# Patient Record
Sex: MALE | Race: WHITE | Employment: OTHER | URBAN - NONMETROPOLITAN AREA
[De-identification: names, ages, dates, MRNs, and addresses within clinical notes are randomized per-mention and may not be internally consistent; named-entity substitution may affect disease eponyms.]

---

## 2020-01-01 ENCOUNTER — HOSPITAL ENCOUNTER (OUTPATIENT)
Dept: INFUSION THERAPY | Age: 85
Discharge: HOME | End: 2020-11-25
Payer: MEDICARE

## 2020-01-01 ENCOUNTER — TELEPHONE (OUTPATIENT)
Dept: UROLOGY | Age: 85
End: 2020-01-01

## 2020-01-01 ENCOUNTER — NURSE ONLY (OUTPATIENT)
Dept: UROLOGY | Age: 85
End: 2020-01-01
Payer: MEDICARE

## 2020-01-01 ENCOUNTER — CARE COORDINATION (OUTPATIENT)
Dept: CASE MANAGEMENT | Age: 85
End: 2020-01-01

## 2020-01-01 ENCOUNTER — HOSPITAL ENCOUNTER (OUTPATIENT)
Dept: INFUSION THERAPY | Age: 85
Discharge: HOME | End: 2020-12-30
Payer: MEDICARE

## 2020-01-01 ENCOUNTER — HOSPITAL ENCOUNTER (OUTPATIENT)
Age: 85
Setting detail: OUTPATIENT SURGERY
Discharge: HOME | End: 2020-12-11
Attending: UROLOGY | Admitting: UROLOGY
Payer: MEDICARE

## 2020-01-01 ENCOUNTER — ANESTHESIA EVENT (OUTPATIENT)
Dept: OPERATING ROOM | Age: 85
End: 2020-01-01
Payer: MEDICARE

## 2020-01-01 ENCOUNTER — ANESTHESIA (OUTPATIENT)
Dept: OPERATING ROOM | Age: 85
End: 2020-01-01
Payer: MEDICARE

## 2020-01-01 ENCOUNTER — HOSPITAL ENCOUNTER (OUTPATIENT)
Age: 85
Setting detail: SPECIMEN
Discharge: HOME | End: 2020-11-25
Payer: MEDICARE

## 2020-01-01 ENCOUNTER — OFFICE VISIT (OUTPATIENT)
Dept: NEPHROLOGY | Age: 85
End: 2020-01-01
Payer: MEDICARE

## 2020-01-01 VITALS
DIASTOLIC BLOOD PRESSURE: 75 MMHG | BODY MASS INDEX: 29.03 KG/M2 | SYSTOLIC BLOOD PRESSURE: 123 MMHG | RESPIRATION RATE: 15 BRPM | OXYGEN SATURATION: 100 % | HEIGHT: 67 IN | WEIGHT: 185 LBS | HEART RATE: 68 BPM | TEMPERATURE: 96.8 F

## 2020-01-01 VITALS
RESPIRATION RATE: 16 BRPM | OXYGEN SATURATION: 98 % | DIASTOLIC BLOOD PRESSURE: 59 MMHG | TEMPERATURE: 97.7 F | SYSTOLIC BLOOD PRESSURE: 118 MMHG | HEART RATE: 83 BPM

## 2020-01-01 VITALS
RESPIRATION RATE: 16 BRPM | TEMPERATURE: 98.2 F | OXYGEN SATURATION: 97 % | DIASTOLIC BLOOD PRESSURE: 53 MMHG | SYSTOLIC BLOOD PRESSURE: 100 MMHG | HEART RATE: 77 BPM

## 2020-01-01 VITALS
DIASTOLIC BLOOD PRESSURE: 60 MMHG | WEIGHT: 181 LBS | HEART RATE: 74 BPM | SYSTOLIC BLOOD PRESSURE: 100 MMHG | BODY MASS INDEX: 28.35 KG/M2

## 2020-01-01 VITALS — OXYGEN SATURATION: 99 % | DIASTOLIC BLOOD PRESSURE: 58 MMHG | TEMPERATURE: 98.1 F | SYSTOLIC BLOOD PRESSURE: 117 MMHG

## 2020-01-01 DIAGNOSIS — C61 PROSTATE CANCER, PRIMARY, WITH METASTASIS FROM PROSTATE TO OTHER SITE (HCC): Primary | ICD-10-CM

## 2020-01-01 LAB
ABSOLUTE EOS #: 0.29 K/UL (ref 0–0.44)
ABSOLUTE IMMATURE GRANULOCYTE: 0.04 K/UL (ref 0–0.3)
ABSOLUTE LYMPH #: 0.74 K/UL (ref 1.1–3.7)
ABSOLUTE MONO #: 0.4 K/UL (ref 0.1–1.2)
ALBUMIN SERPL-MCNC: 4.3 G/DL (ref 3.5–5.2)
ALBUMIN/GLOBULIN RATIO: 1.1 (ref 1–2.5)
ALP BLD-CCNC: 136 U/L (ref 40–129)
ALT SERPL-CCNC: 11 U/L (ref 5–41)
ANION GAP SERPL CALCULATED.3IONS-SCNC: 10 MMOL/L (ref 9–17)
ANION GAP SERPL CALCULATED.3IONS-SCNC: 11 MMOL/L (ref 9–17)
AST SERPL-CCNC: 24 U/L
BASOPHILS # BLD: 0 % (ref 0–2)
BASOPHILS ABSOLUTE: <0.03 K/UL (ref 0–0.2)
BILIRUB SERPL-MCNC: 0.33 MG/DL (ref 0.3–1.2)
BUN BLDV-MCNC: 27 MG/DL (ref 8–23)
BUN BLDV-MCNC: 28 MG/DL (ref 8–23)
BUN/CREAT BLD: 17 (ref 9–20)
BUN/CREAT BLD: 21 (ref 9–20)
CALCIUM SERPL-MCNC: 9.4 MG/DL (ref 8.6–10.4)
CALCIUM SERPL-MCNC: 9.7 MG/DL (ref 8.6–10.4)
CHLORIDE BLD-SCNC: 107 MMOL/L (ref 98–107)
CHLORIDE BLD-SCNC: 109 MMOL/L (ref 98–107)
CO2: 21 MMOL/L (ref 20–31)
CO2: 22 MMOL/L (ref 20–31)
CREAT SERPL-MCNC: 1.36 MG/DL (ref 0.7–1.2)
CREAT SERPL-MCNC: 1.58 MG/DL (ref 0.7–1.2)
CULTURE: ABNORMAL
DIFFERENTIAL TYPE: ABNORMAL
EOSINOPHILS RELATIVE PERCENT: 5 % (ref 1–4)
FERRITIN: 852 UG/L (ref 30–400)
GFR AFRICAN AMERICAN: 51 ML/MIN
GFR AFRICAN AMERICAN: >60 ML/MIN
GFR NON-AFRICAN AMERICAN: 42 ML/MIN
GFR NON-AFRICAN AMERICAN: 50 ML/MIN
GFR SERPL CREATININE-BSD FRML MDRD: ABNORMAL ML/MIN/{1.73_M2}
GLUCOSE BLD-MCNC: 109 MG/DL (ref 70–99)
GLUCOSE FASTING: 101 MG/DL (ref 70–99)
HCT VFR BLD CALC: 34 % (ref 40.7–50.3)
HEMOGLOBIN: 10.4 G/DL (ref 13–17)
IMMATURE GRANULOCYTES: 1 %
IRON SATURATION: 21 % (ref 20–55)
IRON: 55 UG/DL (ref 59–158)
LYMPHOCYTES # BLD: 12 % (ref 24–43)
Lab: ABNORMAL
Lab: ABNORMAL
MCH RBC QN AUTO: 27.4 PG (ref 25.2–33.5)
MCHC RBC AUTO-ENTMCNC: 30.6 G/DL (ref 25.2–33.5)
MCV RBC AUTO: 89.5 FL (ref 82.6–102.9)
MONOCYTES # BLD: 6 % (ref 3–12)
NRBC AUTOMATED: 0 PER 100 WBC
PDW BLD-RTO: 16.5 % (ref 11.8–14.4)
PLATELET # BLD: 233 K/UL (ref 138–453)
PLATELET ESTIMATE: ABNORMAL
PMV BLD AUTO: 9.4 FL (ref 8.1–13.5)
POC POTASSIUM: 4 MMOL/L (ref 3.5–4.5)
POTASSIUM SERPL-SCNC: 4.8 MMOL/L (ref 3.7–5.3)
POTASSIUM SERPL-SCNC: 5.1 MMOL/L (ref 3.7–5.3)
PROSTATE SPECIFIC ANTIGEN: 2.22 UG/L
RBC # BLD: 3.8 M/UL (ref 4.21–5.77)
RBC # BLD: ABNORMAL 10*6/UL
SEG NEUTROPHILS: 76 % (ref 36–65)
SEGMENTED NEUTROPHILS ABSOLUTE COUNT: 4.94 K/UL (ref 1.5–8.1)
SODIUM BLD-SCNC: 140 MMOL/L (ref 135–144)
SODIUM BLD-SCNC: 140 MMOL/L (ref 135–144)
SPECIMEN DESCRIPTION: ABNORMAL
SPECIMEN DESCRIPTION: ABNORMAL
TOTAL IRON BINDING CAPACITY: 256 UG/DL (ref 250–450)
TOTAL PROTEIN: 8.2 G/DL (ref 6.4–8.3)
UNSATURATED IRON BINDING CAPACITY: 201 UG/DL (ref 112–347)
WBC # BLD: 6.4 K/UL (ref 3.5–11.3)
WBC # BLD: ABNORMAL 10*3/UL

## 2020-01-01 PROCEDURE — 99213 OFFICE O/P EST LOW 20 MIN: CPT | Performed by: INTERNAL MEDICINE

## 2020-01-01 PROCEDURE — 82728 ASSAY OF FERRITIN: CPT

## 2020-01-01 PROCEDURE — 84132 ASSAY OF SERUM POTASSIUM: CPT

## 2020-01-01 PROCEDURE — 87186 SC STD MICRODIL/AGAR DIL: CPT

## 2020-01-01 PROCEDURE — G8484 FLU IMMUNIZE NO ADMIN: HCPCS | Performed by: INTERNAL MEDICINE

## 2020-01-01 PROCEDURE — 87086 URINE CULTURE/COLONY COUNT: CPT

## 2020-01-01 PROCEDURE — 84153 ASSAY OF PSA TOTAL: CPT

## 2020-01-01 PROCEDURE — 7100000001 HC PACU RECOVERY - ADDTL 15 MIN: Performed by: UROLOGY

## 2020-01-01 PROCEDURE — 87077 CULTURE AEROBIC IDENTIFY: CPT

## 2020-01-01 PROCEDURE — 3700000000 HC ANESTHESIA ATTENDED CARE: Performed by: UROLOGY

## 2020-01-01 PROCEDURE — 96366 THER/PROPH/DIAG IV INF ADDON: CPT

## 2020-01-01 PROCEDURE — 7100000010 HC PHASE II RECOVERY - FIRST 15 MIN: Performed by: UROLOGY

## 2020-01-01 PROCEDURE — 36415 COLL VENOUS BLD VENIPUNCTURE: CPT

## 2020-01-01 PROCEDURE — 99214 OFFICE O/P EST MOD 30 MIN: CPT

## 2020-01-01 PROCEDURE — 2580000003 HC RX 258: Performed by: UROLOGY

## 2020-01-01 PROCEDURE — 83550 IRON BINDING TEST: CPT

## 2020-01-01 PROCEDURE — 1036F TOBACCO NON-USER: CPT | Performed by: INTERNAL MEDICINE

## 2020-01-01 PROCEDURE — G8427 DOCREV CUR MEDS BY ELIG CLIN: HCPCS | Performed by: INTERNAL MEDICINE

## 2020-01-01 PROCEDURE — 6360000002 HC RX W HCPCS: Performed by: INTERNAL MEDICINE

## 2020-01-01 PROCEDURE — 2500000003 HC RX 250 WO HCPCS: Performed by: NURSE ANESTHETIST, CERTIFIED REGISTERED

## 2020-01-01 PROCEDURE — 80048 BASIC METABOLIC PNL TOTAL CA: CPT

## 2020-01-01 PROCEDURE — 99212 OFFICE O/P EST SF 10 MIN: CPT

## 2020-01-01 PROCEDURE — 6360000002 HC RX W HCPCS: Performed by: NURSE ANESTHETIST, CERTIFIED REGISTERED

## 2020-01-01 PROCEDURE — 96365 THER/PROPH/DIAG IV INF INIT: CPT

## 2020-01-01 PROCEDURE — 1123F ACP DISCUSS/DSCN MKR DOCD: CPT | Performed by: INTERNAL MEDICINE

## 2020-01-01 PROCEDURE — 2720000010 HC SURG SUPPLY STERILE: Performed by: UROLOGY

## 2020-01-01 PROCEDURE — 3600000014 HC SURGERY LEVEL 4 ADDTL 15MIN: Performed by: UROLOGY

## 2020-01-01 PROCEDURE — 6360000002 HC RX W HCPCS: Performed by: STUDENT IN AN ORGANIZED HEALTH CARE EDUCATION/TRAINING PROGRAM

## 2020-01-01 PROCEDURE — 2580000003 HC RX 258: Performed by: ANESTHESIOLOGY

## 2020-01-01 PROCEDURE — 7100000011 HC PHASE II RECOVERY - ADDTL 15 MIN: Performed by: UROLOGY

## 2020-01-01 PROCEDURE — 4040F PNEUMOC VAC/ADMIN/RCVD: CPT | Performed by: INTERNAL MEDICINE

## 2020-01-01 PROCEDURE — G8417 CALC BMI ABV UP PARAM F/U: HCPCS | Performed by: INTERNAL MEDICINE

## 2020-01-01 PROCEDURE — 3600000004 HC SURGERY LEVEL 4 BASE: Performed by: UROLOGY

## 2020-01-01 PROCEDURE — 3700000001 HC ADD 15 MINUTES (ANESTHESIA): Performed by: UROLOGY

## 2020-01-01 PROCEDURE — 80053 COMPREHEN METABOLIC PANEL: CPT

## 2020-01-01 PROCEDURE — 51702 INSERT TEMP BLADDER CATH: CPT | Performed by: UROLOGY

## 2020-01-01 PROCEDURE — 2580000003 HC RX 258: Performed by: INTERNAL MEDICINE

## 2020-01-01 PROCEDURE — 99213 OFFICE O/P EST LOW 20 MIN: CPT | Performed by: UROLOGY

## 2020-01-01 PROCEDURE — 85025 COMPLETE CBC W/AUTO DIFF WBC: CPT

## 2020-01-01 PROCEDURE — 7100000000 HC PACU RECOVERY - FIRST 15 MIN: Performed by: UROLOGY

## 2020-01-01 PROCEDURE — 83540 ASSAY OF IRON: CPT

## 2020-01-01 PROCEDURE — 2709999900 HC NON-CHARGEABLE SUPPLY: Performed by: UROLOGY

## 2020-01-01 PROCEDURE — 96367 TX/PROPH/DG ADDL SEQ IV INF: CPT

## 2020-01-01 PROCEDURE — 96402 CHEMO HORMON ANTINEOPL SQ/IM: CPT

## 2020-01-01 PROCEDURE — 96413 CHEMO IV INFUSION 1 HR: CPT

## 2020-01-01 RX ORDER — SODIUM CHLORIDE 9 MG/ML
20 INJECTION, SOLUTION INTRAVENOUS ONCE
Status: DISCONTINUED | OUTPATIENT
Start: 2020-01-01 | End: 2020-01-01 | Stop reason: HOSPADM

## 2020-01-01 RX ORDER — MAGNESIUM HYDROXIDE 1200 MG/15ML
LIQUID ORAL CONTINUOUS PRN
Status: COMPLETED | OUTPATIENT
Start: 2020-01-01 | End: 2020-01-01

## 2020-01-01 RX ORDER — SODIUM CHLORIDE 0.9 % (FLUSH) 0.9 %
10 SYRINGE (ML) INJECTION PRN
Status: CANCELLED | OUTPATIENT
Start: 2020-01-01

## 2020-01-01 RX ORDER — FERROUS SULFATE 325(65) MG
325 TABLET ORAL 2 TIMES DAILY
Qty: 60 TABLET | Refills: 2 | Status: SHIPPED | OUTPATIENT
Start: 2020-01-01 | End: 2021-01-01 | Stop reason: SDUPTHER

## 2020-01-01 RX ORDER — PROPOFOL 10 MG/ML
INJECTION, EMULSION INTRAVENOUS PRN
Status: DISCONTINUED | OUTPATIENT
Start: 2020-01-01 | End: 2020-01-01 | Stop reason: SDUPTHER

## 2020-01-01 RX ORDER — MAGNESIUM HYDROXIDE 1200 MG/15ML
LIQUID ORAL PRN
Status: DISCONTINUED | OUTPATIENT
Start: 2020-01-01 | End: 2020-01-01 | Stop reason: ALTCHOICE

## 2020-01-01 RX ORDER — MELATONIN
2000 DAILY
Qty: 30 TABLET | Refills: 5 | Status: SHIPPED | OUTPATIENT
Start: 2020-01-01 | End: 2021-01-01 | Stop reason: SDUPTHER

## 2020-01-01 RX ORDER — EPINEPHRINE 1 MG/ML
0.3 INJECTION, SOLUTION, CONCENTRATE INTRAVENOUS PRN
Status: CANCELLED | OUTPATIENT
Start: 2020-01-01

## 2020-01-01 RX ORDER — SODIUM CHLORIDE 0.9 % (FLUSH) 0.9 %
5 SYRINGE (ML) INJECTION PRN
Status: CANCELLED | OUTPATIENT
Start: 2020-01-01

## 2020-01-01 RX ORDER — SODIUM CHLORIDE 9 MG/ML
INJECTION, SOLUTION INTRAVENOUS CONTINUOUS
Status: CANCELLED | OUTPATIENT
Start: 2020-01-01

## 2020-01-01 RX ORDER — LEVOFLOXACIN 250 MG/1
250 TABLET ORAL DAILY
Qty: 7 TABLET | Refills: 0 | Status: SHIPPED | OUTPATIENT
Start: 2020-01-01 | End: 2020-01-01 | Stop reason: ALTCHOICE

## 2020-01-01 RX ORDER — HEPARIN SODIUM (PORCINE) LOCK FLUSH IV SOLN 100 UNIT/ML 100 UNIT/ML
500 SOLUTION INTRAVENOUS PRN
Status: CANCELLED | OUTPATIENT
Start: 2020-01-01

## 2020-01-01 RX ORDER — SODIUM CHLORIDE 9 MG/ML
20 INJECTION, SOLUTION INTRAVENOUS ONCE
Status: CANCELLED | OUTPATIENT
Start: 2020-01-01 | End: 2020-01-01

## 2020-01-01 RX ORDER — PHENYLEPHRINE HYDROCHLORIDE 10 MG/ML
INJECTION INTRAVENOUS PRN
Status: DISCONTINUED | OUTPATIENT
Start: 2020-01-01 | End: 2020-01-01 | Stop reason: SDUPTHER

## 2020-01-01 RX ORDER — LIDOCAINE HYDROCHLORIDE 10 MG/ML
INJECTION, SOLUTION EPIDURAL; INFILTRATION; INTRACAUDAL; PERINEURAL PRN
Status: DISCONTINUED | OUTPATIENT
Start: 2020-01-01 | End: 2020-01-01 | Stop reason: SDUPTHER

## 2020-01-01 RX ORDER — METHYLPREDNISOLONE SODIUM SUCCINATE 125 MG/2ML
125 INJECTION, POWDER, LYOPHILIZED, FOR SOLUTION INTRAMUSCULAR; INTRAVENOUS ONCE
Status: CANCELLED | OUTPATIENT
Start: 2020-01-01 | End: 2020-01-01

## 2020-01-01 RX ORDER — FENTANYL CITRATE 50 UG/ML
INJECTION, SOLUTION INTRAMUSCULAR; INTRAVENOUS PRN
Status: DISCONTINUED | OUTPATIENT
Start: 2020-01-01 | End: 2020-01-01 | Stop reason: SDUPTHER

## 2020-01-01 RX ORDER — WATER 10 ML/10ML
2.2 INJECTION INTRAMUSCULAR; INTRAVENOUS; SUBCUTANEOUS ONCE
Status: CANCELLED | OUTPATIENT
Start: 2020-01-01 | End: 2020-01-01

## 2020-01-01 RX ORDER — LEVOFLOXACIN 5 MG/ML
500 INJECTION, SOLUTION INTRAVENOUS
Status: COMPLETED | OUTPATIENT
Start: 2020-01-01 | End: 2020-01-01

## 2020-01-01 RX ORDER — CEPHALEXIN 500 MG/1
500 CAPSULE ORAL 2 TIMES DAILY
Qty: 20 CAPSULE | Refills: 0 | Status: SHIPPED | OUTPATIENT
Start: 2020-01-01 | End: 2020-01-01

## 2020-01-01 RX ORDER — DIPHENHYDRAMINE HYDROCHLORIDE 50 MG/ML
50 INJECTION INTRAMUSCULAR; INTRAVENOUS ONCE
Status: CANCELLED | OUTPATIENT
Start: 2020-01-01 | End: 2020-01-01

## 2020-01-01 RX ORDER — HYDROCODONE BITARTRATE AND ACETAMINOPHEN 5; 325 MG/1; MG/1
1 TABLET ORAL EVERY 6 HOURS PRN
Qty: 5 TABLET | Refills: 0 | Status: SHIPPED | OUTPATIENT
Start: 2020-01-01 | End: 2020-01-01

## 2020-01-01 RX ORDER — SODIUM CHLORIDE, SODIUM LACTATE, POTASSIUM CHLORIDE, CALCIUM CHLORIDE 600; 310; 30; 20 MG/100ML; MG/100ML; MG/100ML; MG/100ML
INJECTION, SOLUTION INTRAVENOUS CONTINUOUS
Status: DISCONTINUED | OUTPATIENT
Start: 2020-01-01 | End: 2020-01-01 | Stop reason: HOSPADM

## 2020-01-01 RX ORDER — SODIUM CHLORIDE 9 MG/ML
20 INJECTION, SOLUTION INTRAVENOUS ONCE
Status: COMPLETED | OUTPATIENT
Start: 2020-01-01 | End: 2020-01-01

## 2020-01-01 RX ORDER — BICALUTAMIDE 50 MG/1
TABLET, FILM COATED ORAL
Qty: 30 TABLET | Refills: 1 | Status: SHIPPED | OUTPATIENT
Start: 2020-01-01 | End: 2021-01-01 | Stop reason: SDUPTHER

## 2020-01-01 RX ORDER — CIPROFLOXACIN 500 MG/1
500 TABLET, FILM COATED ORAL 2 TIMES DAILY
Qty: 14 TABLET | Refills: 0 | Status: SHIPPED | OUTPATIENT
Start: 2020-01-01 | End: 2020-01-01

## 2020-01-01 RX ADMIN — PHENYLEPHRINE HYDROCHLORIDE 100 MCG: 10 INJECTION INTRAVENOUS at 13:47

## 2020-01-01 RX ADMIN — SODIUM CHLORIDE, POTASSIUM CHLORIDE, SODIUM LACTATE AND CALCIUM CHLORIDE: 600; 310; 30; 20 INJECTION, SOLUTION INTRAVENOUS at 11:45

## 2020-01-01 RX ADMIN — SODIUM CHLORIDE 20 ML/HR: 9 INJECTION, SOLUTION INTRAVENOUS at 11:45

## 2020-01-01 RX ADMIN — ZOLEDRONIC ACID 3 MG: 4 INJECTION, SOLUTION, CONCENTRATE INTRAVENOUS at 12:20

## 2020-01-01 RX ADMIN — FENTANYL CITRATE 25 MCG: 50 INJECTION, SOLUTION INTRAMUSCULAR; INTRAVENOUS at 13:12

## 2020-01-01 RX ADMIN — LEUPROLIDE ACETATE 22.5 MG: KIT SUBCUTANEOUS at 11:31

## 2020-01-01 RX ADMIN — PROPOFOL 130 MG: 10 INJECTION, EMULSION INTRAVENOUS at 13:00

## 2020-01-01 RX ADMIN — PHENYLEPHRINE HYDROCHLORIDE 100 MCG: 10 INJECTION INTRAVENOUS at 13:12

## 2020-01-01 RX ADMIN — ZOLEDRONIC ACID 3 MG: 4 INJECTION, SOLUTION, CONCENTRATE INTRAVENOUS at 10:39

## 2020-01-01 RX ADMIN — LEVOFLOXACIN 500 MG: 5 INJECTION, SOLUTION INTRAVENOUS at 13:04

## 2020-01-01 RX ADMIN — FENTANYL CITRATE 25 MCG: 50 INJECTION, SOLUTION INTRAMUSCULAR; INTRAVENOUS at 13:30

## 2020-01-01 RX ADMIN — PHENYLEPHRINE HYDROCHLORIDE 100 MCG: 10 INJECTION INTRAVENOUS at 13:14

## 2020-01-01 RX ADMIN — PHENYLEPHRINE HYDROCHLORIDE 100 MCG: 10 INJECTION INTRAVENOUS at 14:06

## 2020-01-01 RX ADMIN — PHENYLEPHRINE HYDROCHLORIDE 100 MCG: 10 INJECTION INTRAVENOUS at 13:23

## 2020-01-01 RX ADMIN — PHENYLEPHRINE HYDROCHLORIDE 100 MCG: 10 INJECTION INTRAVENOUS at 13:38

## 2020-01-01 RX ADMIN — SODIUM CHLORIDE 20 ML/HR: 9 INJECTION, SOLUTION INTRAVENOUS at 09:53

## 2020-01-01 RX ADMIN — LIDOCAINE HYDROCHLORIDE 40 MG: 10 INJECTION, SOLUTION EPIDURAL; INFILTRATION; INTRACAUDAL; PERINEURAL at 13:00

## 2020-01-01 RX ADMIN — FENTANYL CITRATE 25 MCG: 50 INJECTION, SOLUTION INTRAMUSCULAR; INTRAVENOUS at 13:20

## 2020-01-01 RX ADMIN — FENTANYL CITRATE 25 MCG: 50 INJECTION, SOLUTION INTRAMUSCULAR; INTRAVENOUS at 13:55

## 2020-01-01 ASSESSMENT — PULMONARY FUNCTION TESTS
PIF_VALUE: 8
PIF_VALUE: 9
PIF_VALUE: 2
PIF_VALUE: 8
PIF_VALUE: 9
PIF_VALUE: 9
PIF_VALUE: 7
PIF_VALUE: 8
PIF_VALUE: 9
PIF_VALUE: 9
PIF_VALUE: 10
PIF_VALUE: 9
PIF_VALUE: 7
PIF_VALUE: 0
PIF_VALUE: 8
PIF_VALUE: 7
PIF_VALUE: 3
PIF_VALUE: 7
PIF_VALUE: 8
PIF_VALUE: 10
PIF_VALUE: 8
PIF_VALUE: 1
PIF_VALUE: 7
PIF_VALUE: 8
PIF_VALUE: 9
PIF_VALUE: 8
PIF_VALUE: 9
PIF_VALUE: 10
PIF_VALUE: 9
PIF_VALUE: 8
PIF_VALUE: 8
PIF_VALUE: 3
PIF_VALUE: 8
PIF_VALUE: 9
PIF_VALUE: 8
PIF_VALUE: 8
PIF_VALUE: 9
PIF_VALUE: 7
PIF_VALUE: 8
PIF_VALUE: 7
PIF_VALUE: 9
PIF_VALUE: 2
PIF_VALUE: 11
PIF_VALUE: 8
PIF_VALUE: 9
PIF_VALUE: 9
PIF_VALUE: 10
PIF_VALUE: 8
PIF_VALUE: 8
PIF_VALUE: 9
PIF_VALUE: 8
PIF_VALUE: 0
PIF_VALUE: 9
PIF_VALUE: 8
PIF_VALUE: 9
PIF_VALUE: 8
PIF_VALUE: 9
PIF_VALUE: 8
PIF_VALUE: 7
PIF_VALUE: 3
PIF_VALUE: 7
PIF_VALUE: 3
PIF_VALUE: 8
PIF_VALUE: 11
PIF_VALUE: 1
PIF_VALUE: 7
PIF_VALUE: 7
PIF_VALUE: 8
PIF_VALUE: 0
PIF_VALUE: 11
PIF_VALUE: 8
PIF_VALUE: 7
PIF_VALUE: 7
PIF_VALUE: 1
PIF_VALUE: 8
PIF_VALUE: 8
PIF_VALUE: 3
PIF_VALUE: 8
PIF_VALUE: 8
PIF_VALUE: 0
PIF_VALUE: 7
PIF_VALUE: 9
PIF_VALUE: 9
PIF_VALUE: 4
PIF_VALUE: 9
PIF_VALUE: 10
PIF_VALUE: 8
PIF_VALUE: 7
PIF_VALUE: 8
PIF_VALUE: 9
PIF_VALUE: 3
PIF_VALUE: 9
PIF_VALUE: 7
PIF_VALUE: 13
PIF_VALUE: 8

## 2020-01-01 ASSESSMENT — PAIN - FUNCTIONAL ASSESSMENT: PAIN_FUNCTIONAL_ASSESSMENT: 0-10

## 2020-01-01 ASSESSMENT — PAIN SCALES - GENERAL: PAINLEVEL_OUTOF10: 0

## 2020-08-28 ENCOUNTER — HOSPITAL ENCOUNTER (INPATIENT)
Age: 85
LOS: 5 days | Discharge: HOME HEALTH | DRG: 682 | End: 2020-09-02
Attending: INTERNAL MEDICINE | Admitting: INTERNAL MEDICINE
Payer: MEDICARE

## 2020-08-28 ENCOUNTER — APPOINTMENT (OUTPATIENT)
Dept: ULTRASOUND IMAGING | Age: 85
DRG: 682 | End: 2020-08-28
Attending: INTERNAL MEDICINE
Payer: MEDICARE

## 2020-08-28 PROBLEM — I25.810 CORONARY ARTERY DISEASE INVOLVING CORONARY BYPASS GRAFT OF NATIVE HEART WITHOUT ANGINA PECTORIS: Status: ACTIVE | Noted: 2020-08-28

## 2020-08-28 PROBLEM — N13.8 BENIGN PROSTATIC HYPERPLASIA WITH URINARY OBSTRUCTION: Status: ACTIVE | Noted: 2020-08-28

## 2020-08-28 PROBLEM — N40.1 BENIGN PROSTATIC HYPERPLASIA WITH URINARY OBSTRUCTION: Status: ACTIVE | Noted: 2020-08-28

## 2020-08-28 PROBLEM — I50.23 ACUTE ON CHRONIC SYSTOLIC HEART FAILURE (HCC): Status: ACTIVE | Noted: 2020-08-28

## 2020-08-28 PROBLEM — N17.9 ACUTE RENAL FAILURE (HCC): Status: ACTIVE | Noted: 2020-08-28

## 2020-08-28 PROBLEM — N13.30 HYDROURETERONEPHROSIS: Status: ACTIVE | Noted: 2020-08-28

## 2020-08-28 PROBLEM — E87.5 HYPERKALEMIA: Status: ACTIVE | Noted: 2020-08-28

## 2020-08-28 LAB
ABSOLUTE EOS #: 0.17 K/UL (ref 0–0.4)
ABSOLUTE IMMATURE GRANULOCYTE: 0 K/UL (ref 0–0.3)
ABSOLUTE LYMPH #: 0.94 K/UL (ref 1–4.8)
ABSOLUTE MONO #: 0.17 K/UL (ref 0.1–0.8)
ALBUMIN SERPL-MCNC: 3.4 G/DL (ref 3.5–5.2)
ALBUMIN/GLOBULIN RATIO: 1.3 (ref 1–2.5)
ALP BLD-CCNC: 93 U/L (ref 40–129)
ALT SERPL-CCNC: 11 U/L (ref 5–41)
ANION GAP SERPL CALCULATED.3IONS-SCNC: 19 MMOL/L (ref 9–17)
AST SERPL-CCNC: 12 U/L
BASOPHILS # BLD: 0 % (ref 0–2)
BASOPHILS ABSOLUTE: 0 K/UL (ref 0–0.2)
BILIRUB SERPL-MCNC: 0.44 MG/DL (ref 0.3–1.2)
BILIRUBIN DIRECT: 0.24 MG/DL
BILIRUBIN, INDIRECT: 0.2 MG/DL (ref 0–1)
BUN BLDV-MCNC: 103 MG/DL (ref 8–23)
CALCIUM SERPL-MCNC: 7.5 MG/DL (ref 8.6–10.4)
CHLORIDE BLD-SCNC: 103 MMOL/L (ref 98–107)
CO2: 18 MMOL/L (ref 20–31)
CREAT SERPL-MCNC: 7.76 MG/DL (ref 0.7–1.2)
DIFFERENTIAL TYPE: ABNORMAL
EOSINOPHILS RELATIVE PERCENT: 3 % (ref 1–4)
GFR AFRICAN AMERICAN: 8 ML/MIN
GFR NON-AFRICAN AMERICAN: 7 ML/MIN
GFR SERPL CREATININE-BSD FRML MDRD: ABNORMAL ML/MIN/{1.73_M2}
GFR SERPL CREATININE-BSD FRML MDRD: ABNORMAL ML/MIN/{1.73_M2}
GLUCOSE BLD-MCNC: 123 MG/DL (ref 70–99)
HCT VFR BLD CALC: 28.8 % (ref 40.7–50.3)
HEMOGLOBIN: 8.8 G/DL (ref 13–17)
IMMATURE GRANULOCYTES: 0 %
LYMPHOCYTES # BLD: 17 % (ref 24–44)
MCH RBC QN AUTO: 29.9 PG (ref 25.2–33.5)
MCHC RBC AUTO-ENTMCNC: 30.6 G/DL (ref 28.4–34.8)
MCV RBC AUTO: 98 FL (ref 82.6–102.9)
MONOCYTES # BLD: 3 % (ref 1–7)
MORPHOLOGY: NORMAL
NRBC AUTOMATED: 0 PER 100 WBC
PDW BLD-RTO: 13.9 % (ref 11.8–14.4)
PLATELET # BLD: 124 K/UL (ref 138–453)
PLATELET ESTIMATE: ABNORMAL
PMV BLD AUTO: 10.4 FL (ref 8.1–13.5)
POTASSIUM SERPL-SCNC: 5.2 MMOL/L (ref 3.7–5.3)
RBC # BLD: 2.94 M/UL (ref 4.21–5.77)
RBC # BLD: ABNORMAL 10*6/UL
SEG NEUTROPHILS: 77 % (ref 36–66)
SEGMENTED NEUTROPHILS ABSOLUTE COUNT: 4.22 K/UL (ref 1.8–7.7)
SODIUM BLD-SCNC: 140 MMOL/L (ref 135–144)
TOTAL PROTEIN: 6.1 G/DL (ref 6.4–8.3)
WBC # BLD: 5.5 K/UL (ref 3.5–11.3)
WBC # BLD: ABNORMAL 10*3/UL

## 2020-08-28 PROCEDURE — 76775 US EXAM ABDO BACK WALL LIM: CPT

## 2020-08-28 PROCEDURE — 84484 ASSAY OF TROPONIN QUANT: CPT

## 2020-08-28 PROCEDURE — 82248 BILIRUBIN DIRECT: CPT

## 2020-08-28 PROCEDURE — 80053 COMPREHEN METABOLIC PANEL: CPT

## 2020-08-28 PROCEDURE — 1200000000 HC SEMI PRIVATE

## 2020-08-28 PROCEDURE — 83874 ASSAY OF MYOGLOBIN: CPT

## 2020-08-28 PROCEDURE — 85025 COMPLETE CBC W/AUTO DIFF WBC: CPT

## 2020-08-28 PROCEDURE — 84153 ASSAY OF PSA TOTAL: CPT

## 2020-08-28 PROCEDURE — 82378 CARCINOEMBRYONIC ANTIGEN: CPT

## 2020-08-28 PROCEDURE — 83880 ASSAY OF NATRIURETIC PEPTIDE: CPT

## 2020-08-28 PROCEDURE — APPSS60 APP SPLIT SHARED TIME 46-60 MINUTES: Performed by: NURSE PRACTITIONER

## 2020-08-28 PROCEDURE — 36415 COLL VENOUS BLD VENIPUNCTURE: CPT

## 2020-08-28 PROCEDURE — 2580000003 HC RX 258: Performed by: NURSE PRACTITIONER

## 2020-08-28 RX ORDER — SACUBITRIL AND VALSARTAN 97; 103 MG/1; MG/1
1 TABLET, FILM COATED ORAL 2 TIMES DAILY
Status: ON HOLD | COMMUNITY
End: 2020-09-02 | Stop reason: HOSPADM

## 2020-08-28 RX ORDER — PROMETHAZINE HYDROCHLORIDE 25 MG/1
12.5 TABLET ORAL EVERY 6 HOURS PRN
Status: DISCONTINUED | OUTPATIENT
Start: 2020-08-28 | End: 2020-09-02 | Stop reason: HOSPADM

## 2020-08-28 RX ORDER — ASPIRIN 81 MG/1
81 TABLET ORAL DAILY
Status: ON HOLD | COMMUNITY
End: 2020-01-01 | Stop reason: HOSPADM

## 2020-08-28 RX ORDER — ONDANSETRON 2 MG/ML
4 INJECTION INTRAMUSCULAR; INTRAVENOUS EVERY 6 HOURS PRN
Status: DISCONTINUED | OUTPATIENT
Start: 2020-08-28 | End: 2020-09-02 | Stop reason: HOSPADM

## 2020-08-28 RX ORDER — SODIUM CHLORIDE 0.9 % (FLUSH) 0.9 %
10 SYRINGE (ML) INJECTION PRN
Status: DISCONTINUED | OUTPATIENT
Start: 2020-08-28 | End: 2020-09-02 | Stop reason: HOSPADM

## 2020-08-28 RX ORDER — NICOTINE 21 MG/24HR
1 PATCH, TRANSDERMAL 24 HOURS TRANSDERMAL DAILY PRN
Status: DISCONTINUED | OUTPATIENT
Start: 2020-08-28 | End: 2020-09-02 | Stop reason: HOSPADM

## 2020-08-28 RX ORDER — ROSUVASTATIN CALCIUM 10 MG/1
10 TABLET, COATED ORAL DAILY
COMMUNITY

## 2020-08-28 RX ORDER — CARVEDILOL 25 MG/1
25 TABLET ORAL 2 TIMES DAILY
Status: ON HOLD | COMMUNITY
End: 2020-09-02 | Stop reason: HOSPADM

## 2020-08-28 RX ORDER — SODIUM CHLORIDE 0.9 % (FLUSH) 0.9 %
10 SYRINGE (ML) INJECTION EVERY 12 HOURS SCHEDULED
Status: DISCONTINUED | OUTPATIENT
Start: 2020-08-28 | End: 2020-09-02 | Stop reason: HOSPADM

## 2020-08-28 RX ORDER — ACETAMINOPHEN 650 MG/1
650 SUPPOSITORY RECTAL EVERY 6 HOURS PRN
Status: DISCONTINUED | OUTPATIENT
Start: 2020-08-28 | End: 2020-09-02 | Stop reason: HOSPADM

## 2020-08-28 RX ORDER — ACETAMINOPHEN 325 MG/1
650 TABLET ORAL EVERY 6 HOURS PRN
Status: DISCONTINUED | OUTPATIENT
Start: 2020-08-28 | End: 2020-09-02 | Stop reason: HOSPADM

## 2020-08-28 RX ORDER — SACUBITRIL AND VALSARTAN 24; 26 MG/1; MG/1
1 TABLET, FILM COATED ORAL 2 TIMES DAILY
Status: ON HOLD | COMMUNITY
End: 2020-09-02 | Stop reason: HOSPADM

## 2020-08-28 RX ORDER — TAMSULOSIN HYDROCHLORIDE 0.4 MG/1
0.4 CAPSULE ORAL DAILY
COMMUNITY
End: 2021-01-01 | Stop reason: ALTCHOICE

## 2020-08-28 RX ORDER — POLYETHYLENE GLYCOL 3350 17 G/17G
17 POWDER, FOR SOLUTION ORAL DAILY PRN
Status: DISCONTINUED | OUTPATIENT
Start: 2020-08-28 | End: 2020-09-02 | Stop reason: HOSPADM

## 2020-08-28 RX ADMIN — SODIUM CHLORIDE, PRESERVATIVE FREE 10 ML: 5 INJECTION INTRAVENOUS at 23:17

## 2020-08-28 SDOH — HEALTH STABILITY: MENTAL HEALTH: HOW MANY STANDARD DRINKS CONTAINING ALCOHOL DO YOU HAVE ON A TYPICAL DAY?: 1 OR 2

## 2020-08-28 SDOH — HEALTH STABILITY: MENTAL HEALTH: HOW OFTEN DO YOU HAVE A DRINK CONTAINING ALCOHOL?: 4 OR MORE TIMES A WEEK

## 2020-08-29 ENCOUNTER — APPOINTMENT (OUTPATIENT)
Dept: NUCLEAR MEDICINE | Age: 85
DRG: 682 | End: 2020-08-29
Attending: INTERNAL MEDICINE
Payer: MEDICARE

## 2020-08-29 PROBLEM — R31.0 GROSS HEMATURIA: Status: ACTIVE | Noted: 2020-08-29

## 2020-08-29 PROBLEM — K92.2 UPPER GASTROINTESTINAL BLEEDING: Status: ACTIVE | Noted: 2018-09-13

## 2020-08-29 PROBLEM — C61 PROSTATE CANCER, PRIMARY, WITH METASTASIS FROM PROSTATE TO OTHER SITE (HCC): Status: ACTIVE | Noted: 2020-08-29

## 2020-08-29 PROBLEM — I44.7 LBBB (LEFT BUNDLE BRANCH BLOCK): Status: ACTIVE | Noted: 2020-08-29

## 2020-08-29 PROBLEM — I12.9 BENIGN HYPERTENSION WITH CHRONIC KIDNEY DISEASE, STAGE II: Status: ACTIVE | Noted: 2020-08-29

## 2020-08-29 PROBLEM — N18.2 BENIGN HYPERTENSION WITH CHRONIC KIDNEY DISEASE, STAGE II: Status: ACTIVE | Noted: 2020-08-29

## 2020-08-29 PROBLEM — N13.9 OBSTRUCTIVE UROPATHY: Status: ACTIVE | Noted: 2020-08-29

## 2020-08-29 PROBLEM — I71.40 ABDOMINAL AORTIC ANEURYSM (AAA) WITHOUT RUPTURE: Status: ACTIVE | Noted: 2020-07-28

## 2020-08-29 PROBLEM — E78.00 HYPERCHOLESTEROLEMIA: Status: ACTIVE | Noted: 2018-09-13

## 2020-08-29 PROBLEM — E78.2 MIXED HYPERLIPIDEMIA: Status: ACTIVE | Noted: 2020-07-27

## 2020-08-29 PROBLEM — N18.30 CKD (CHRONIC KIDNEY DISEASE) STAGE 3, GFR 30-59 ML/MIN (HCC): Chronic | Status: ACTIVE | Noted: 2020-08-29

## 2020-08-29 PROBLEM — I50.22 CHRONIC SYSTOLIC HEART FAILURE (HCC): Status: ACTIVE | Noted: 2018-09-13

## 2020-08-29 LAB
-: NORMAL
ABSOLUTE EOS #: 0.22 K/UL (ref 0–0.44)
ABSOLUTE IMMATURE GRANULOCYTE: 0 K/UL (ref 0–0.3)
ABSOLUTE LYMPH #: 0.43 K/UL (ref 1.1–3.7)
ABSOLUTE MONO #: 0.49 K/UL (ref 0.1–1.2)
ALBUMIN SERPL-MCNC: 3.5 G/DL (ref 3.5–5.2)
ALBUMIN SERPL-MCNC: 3.5 G/DL (ref 3.5–5.2)
ALBUMIN/GLOBULIN RATIO: 1.4 (ref 1–2.5)
ALP BLD-CCNC: 93 U/L (ref 40–129)
ALT SERPL-CCNC: 10 U/L (ref 5–41)
AMORPHOUS: NORMAL
ANION GAP SERPL CALCULATED.3IONS-SCNC: 18 MMOL/L (ref 9–17)
ANION GAP SERPL CALCULATED.3IONS-SCNC: 19 MMOL/L (ref 9–17)
AST SERPL-CCNC: 11 U/L
BACTERIA: NORMAL
BASOPHILS # BLD: 0 % (ref 0–2)
BASOPHILS ABSOLUTE: 0 K/UL (ref 0–0.2)
BILIRUB SERPL-MCNC: 0.56 MG/DL (ref 0.3–1.2)
BILIRUBIN DIRECT: 0.27 MG/DL
BILIRUBIN URINE: NEGATIVE
BILIRUBIN, INDIRECT: 0.29 MG/DL (ref 0–1)
BNP INTERPRETATION: ABNORMAL
BNP INTERPRETATION: ABNORMAL
BUN BLDV-MCNC: 92 MG/DL (ref 8–23)
BUN BLDV-MCNC: 94 MG/DL (ref 8–23)
BUN/CREAT BLD: ABNORMAL (ref 9–20)
BUN/CREAT BLD: ABNORMAL (ref 9–20)
CALCIUM SERPL-MCNC: 7.5 MG/DL (ref 8.6–10.4)
CALCIUM SERPL-MCNC: 7.6 MG/DL (ref 8.6–10.4)
CARCINOEMBRYONIC ANTIGEN: 2 NG/ML
CASTS UA: NORMAL /LPF (ref 0–2)
CHLORIDE BLD-SCNC: 104 MMOL/L (ref 98–107)
CHLORIDE BLD-SCNC: 104 MMOL/L (ref 98–107)
CO2: 16 MMOL/L (ref 20–31)
CO2: 21 MMOL/L (ref 20–31)
COLOR: ABNORMAL
COMMENT UA: ABNORMAL
COMPLEMENT C3: 101 MG/DL (ref 90–180)
COMPLEMENT C4: 39 MG/DL (ref 10–40)
CREAT SERPL-MCNC: 6.01 MG/DL (ref 0.7–1.2)
CREAT SERPL-MCNC: 6.04 MG/DL (ref 0.7–1.2)
CREATININE URINE: 37.9 MG/DL (ref 39–259)
CRYSTALS, UA: NORMAL /HPF
DIFFERENTIAL TYPE: ABNORMAL
EOSINOPHIL,URINE: ABNORMAL
EOSINOPHILS RELATIVE PERCENT: 4 % (ref 1–4)
EPITHELIAL CELLS UA: NORMAL /HPF (ref 0–5)
FREE KAPPA/LAMBDA RATIO: 1.38 (ref 0.26–1.65)
GFR AFRICAN AMERICAN: 11 ML/MIN
GFR AFRICAN AMERICAN: 11 ML/MIN
GFR NON-AFRICAN AMERICAN: 9 ML/MIN
GFR NON-AFRICAN AMERICAN: 9 ML/MIN
GFR SERPL CREATININE-BSD FRML MDRD: ABNORMAL ML/MIN/{1.73_M2}
GLOBULIN: ABNORMAL G/DL (ref 1.5–3.8)
GLUCOSE BLD-MCNC: 100 MG/DL (ref 70–99)
GLUCOSE BLD-MCNC: 158 MG/DL (ref 70–99)
GLUCOSE URINE: NEGATIVE
HCT VFR BLD CALC: 28.1 % (ref 40.7–50.3)
HEMOGLOBIN: 8.5 G/DL (ref 13–17)
IMMATURE GRANULOCYTES: 0 %
IRON SATURATION: 11 % (ref 20–55)
IRON: 27 UG/DL (ref 59–158)
KAPPA FREE LIGHT CHAINS QNT: 4.44 MG/DL (ref 0.37–1.94)
KETONES, URINE: ABNORMAL
LAMBDA FREE LIGHT CHAINS QNT: 3.21 MG/DL (ref 0.57–2.63)
LEUKOCYTE ESTERASE, URINE: ABNORMAL
LYMPHOCYTES # BLD: 8 % (ref 24–43)
MAGNESIUM: 2.3 MG/DL (ref 1.6–2.6)
MCH RBC QN AUTO: 28.7 PG (ref 25.2–33.5)
MCHC RBC AUTO-ENTMCNC: 30.2 G/DL (ref 28.4–34.8)
MCV RBC AUTO: 94.9 FL (ref 82.6–102.9)
MONOCYTES # BLD: 9 % (ref 3–12)
MORPHOLOGY: NORMAL
MUCUS: NORMAL
MYOGLOBIN: 113 NG/ML (ref 28–72)
MYOGLOBIN: 124 NG/ML (ref 28–72)
MYOGLOBIN: 127 NG/ML (ref 28–72)
MYOGLOBIN: 140 NG/ML (ref 28–72)
NITRITE, URINE: POSITIVE
NRBC AUTOMATED: 0 PER 100 WBC
OSMOLALITY URINE: 326 MOSM/KG (ref 80–1300)
OTHER OBSERVATIONS UA: NORMAL
PDW BLD-RTO: 13.9 % (ref 11.8–14.4)
PH UA: 7.5 (ref 5–8)
PHOSPHORUS: 5.9 MG/DL (ref 2.5–4.5)
PLATELET # BLD: 157 K/UL (ref 138–453)
PLATELET ESTIMATE: ABNORMAL
PMV BLD AUTO: 10.6 FL (ref 8.1–13.5)
POTASSIUM SERPL-SCNC: 4.3 MMOL/L (ref 3.7–5.3)
POTASSIUM SERPL-SCNC: 4.5 MMOL/L (ref 3.7–5.3)
PRO-BNP: ABNORMAL PG/ML
PRO-BNP: ABNORMAL PG/ML
PROSTATE SPECIFIC ANTIGEN: 97.66 UG/L
PROTEIN UA: ABNORMAL
RBC # BLD: 2.96 M/UL (ref 4.21–5.77)
RBC # BLD: ABNORMAL 10*6/UL
RBC UA: NORMAL /HPF (ref 0–2)
RENAL EPITHELIAL, UA: NORMAL /HPF
SEG NEUTROPHILS: 79 % (ref 36–65)
SEGMENTED NEUTROPHILS ABSOLUTE COUNT: 4.26 K/UL (ref 1.5–8.1)
SODIUM BLD-SCNC: 139 MMOL/L (ref 135–144)
SODIUM BLD-SCNC: 143 MMOL/L (ref 135–144)
SODIUM,UR: 98 MMOL/L
SPECIFIC GRAVITY UA: 1.01 (ref 1–1.03)
TOTAL CK: 99 U/L (ref 39–308)
TOTAL IRON BINDING CAPACITY: 240 UG/DL (ref 250–450)
TOTAL PROTEIN, URINE: 279 MG/DL
TOTAL PROTEIN: 6 G/DL (ref 6.4–8.3)
TRICHOMONAS: NORMAL
TROPONIN INTERP: ABNORMAL
TROPONIN T: ABNORMAL NG/ML
TROPONIN, HIGH SENSITIVITY: 103 NG/L (ref 0–22)
TROPONIN, HIGH SENSITIVITY: 105 NG/L (ref 0–22)
TROPONIN, HIGH SENSITIVITY: 105 NG/L (ref 0–22)
TROPONIN, HIGH SENSITIVITY: 106 NG/L (ref 0–22)
TURBIDITY: ABNORMAL
UNSATURATED IRON BINDING CAPACITY: 213 UG/DL (ref 112–347)
URINE HGB: ABNORMAL
URINE TOTAL PROTEIN CREATININE RATIO: 7.36 (ref 0–0.2)
UROBILINOGEN, URINE: ABNORMAL
WBC # BLD: 5.4 K/UL (ref 3.5–11.3)
WBC # BLD: ABNORMAL 10*3/UL
WBC UA: NORMAL /HPF (ref 0–5)
YEAST: NORMAL

## 2020-08-29 PROCEDURE — 87205 SMEAR GRAM STAIN: CPT

## 2020-08-29 PROCEDURE — 84155 ASSAY OF PROTEIN SERUM: CPT

## 2020-08-29 PROCEDURE — 82550 ASSAY OF CK (CPK): CPT

## 2020-08-29 PROCEDURE — 93005 ELECTROCARDIOGRAM TRACING: CPT | Performed by: NURSE PRACTITIONER

## 2020-08-29 PROCEDURE — 78306 BONE IMAGING WHOLE BODY: CPT

## 2020-08-29 PROCEDURE — 36415 COLL VENOUS BLD VENIPUNCTURE: CPT

## 2020-08-29 PROCEDURE — 82570 ASSAY OF URINE CREATININE: CPT

## 2020-08-29 PROCEDURE — 83880 ASSAY OF NATRIURETIC PEPTIDE: CPT

## 2020-08-29 PROCEDURE — 84156 ASSAY OF PROTEIN URINE: CPT

## 2020-08-29 PROCEDURE — 83935 ASSAY OF URINE OSMOLALITY: CPT

## 2020-08-29 PROCEDURE — 83735 ASSAY OF MAGNESIUM: CPT

## 2020-08-29 PROCEDURE — 80069 RENAL FUNCTION PANEL: CPT

## 2020-08-29 PROCEDURE — 3430000000 HC RX DIAGNOSTIC RADIOPHARMACEUTICAL: Performed by: INTERNAL MEDICINE

## 2020-08-29 PROCEDURE — 84165 PROTEIN E-PHORESIS SERUM: CPT

## 2020-08-29 PROCEDURE — 6370000000 HC RX 637 (ALT 250 FOR IP): Performed by: STUDENT IN AN ORGANIZED HEALTH CARE EDUCATION/TRAINING PROGRAM

## 2020-08-29 PROCEDURE — 2580000003 HC RX 258: Performed by: NURSE PRACTITIONER

## 2020-08-29 PROCEDURE — 2500000003 HC RX 250 WO HCPCS: Performed by: INTERNAL MEDICINE

## 2020-08-29 PROCEDURE — 99233 SBSQ HOSP IP/OBS HIGH 50: CPT | Performed by: INTERNAL MEDICINE

## 2020-08-29 PROCEDURE — 51700 IRRIGATION OF BLADDER: CPT

## 2020-08-29 PROCEDURE — 80053 COMPREHEN METABOLIC PANEL: CPT

## 2020-08-29 PROCEDURE — 86160 COMPLEMENT ANTIGEN: CPT

## 2020-08-29 PROCEDURE — 6370000000 HC RX 637 (ALT 250 FOR IP): Performed by: NURSE PRACTITIONER

## 2020-08-29 PROCEDURE — 83540 ASSAY OF IRON: CPT

## 2020-08-29 PROCEDURE — 83874 ASSAY OF MYOGLOBIN: CPT

## 2020-08-29 PROCEDURE — 2580000003 HC RX 258: Performed by: STUDENT IN AN ORGANIZED HEALTH CARE EDUCATION/TRAINING PROGRAM

## 2020-08-29 PROCEDURE — 84100 ASSAY OF PHOSPHORUS: CPT

## 2020-08-29 PROCEDURE — 84300 ASSAY OF URINE SODIUM: CPT

## 2020-08-29 PROCEDURE — 51702 INSERT TEMP BLADDER CATH: CPT

## 2020-08-29 PROCEDURE — 85025 COMPLETE CBC W/AUTO DIFF WBC: CPT

## 2020-08-29 PROCEDURE — 1200000000 HC SEMI PRIVATE

## 2020-08-29 PROCEDURE — 80048 BASIC METABOLIC PNL TOTAL CA: CPT

## 2020-08-29 PROCEDURE — 83883 ASSAY NEPHELOMETRY NOT SPEC: CPT

## 2020-08-29 PROCEDURE — 86334 IMMUNOFIX E-PHORESIS SERUM: CPT

## 2020-08-29 PROCEDURE — 99221 1ST HOSP IP/OBS SF/LOW 40: CPT | Performed by: INTERNAL MEDICINE

## 2020-08-29 PROCEDURE — 81001 URINALYSIS AUTO W/SCOPE: CPT

## 2020-08-29 PROCEDURE — 80076 HEPATIC FUNCTION PANEL: CPT

## 2020-08-29 PROCEDURE — 84166 PROTEIN E-PHORESIS/URINE/CSF: CPT

## 2020-08-29 PROCEDURE — A9503 TC99M MEDRONATE: HCPCS | Performed by: INTERNAL MEDICINE

## 2020-08-29 PROCEDURE — 86335 IMMUNFIX E-PHORSIS/URINE/CSF: CPT

## 2020-08-29 PROCEDURE — 2580000003 HC RX 258: Performed by: INTERNAL MEDICINE

## 2020-08-29 PROCEDURE — 82248 BILIRUBIN DIRECT: CPT

## 2020-08-29 PROCEDURE — 84484 ASSAY OF TROPONIN QUANT: CPT

## 2020-08-29 PROCEDURE — 83550 IRON BINDING TEST: CPT

## 2020-08-29 RX ORDER — ASPIRIN 81 MG/1
TABLET, CHEWABLE ORAL
Status: ON HOLD | COMMUNITY
End: 2020-09-02 | Stop reason: HOSPADM

## 2020-08-29 RX ORDER — MAGNESIUM HYDROXIDE 1200 MG/15ML
3000 LIQUID ORAL CONTINUOUS
Status: DISCONTINUED | OUTPATIENT
Start: 2020-08-29 | End: 2020-09-02 | Stop reason: HOSPADM

## 2020-08-29 RX ORDER — ASPIRIN 81 MG/1
81 TABLET ORAL DAILY
Status: DISCONTINUED | OUTPATIENT
Start: 2020-08-29 | End: 2020-09-02 | Stop reason: HOSPADM

## 2020-08-29 RX ORDER — FUROSEMIDE 40 MG/1
40 TABLET ORAL 2 TIMES DAILY
Status: ON HOLD | COMMUNITY
Start: 2020-08-11 | End: 2020-09-02 | Stop reason: SDUPTHER

## 2020-08-29 RX ORDER — TC 99M MEDRONATE 20 MG/10ML
27 INJECTION, POWDER, LYOPHILIZED, FOR SOLUTION INTRAVENOUS
Status: COMPLETED | OUTPATIENT
Start: 2020-08-29 | End: 2020-08-29

## 2020-08-29 RX ORDER — TAMSULOSIN HYDROCHLORIDE 0.4 MG/1
0.4 CAPSULE ORAL DAILY
Status: DISCONTINUED | OUTPATIENT
Start: 2020-08-29 | End: 2020-09-02 | Stop reason: HOSPADM

## 2020-08-29 RX ORDER — OXYBUTYNIN CHLORIDE 5 MG/1
5 TABLET ORAL EVERY 6 HOURS PRN
Status: DISCONTINUED | OUTPATIENT
Start: 2020-08-29 | End: 2020-08-30

## 2020-08-29 RX ORDER — 0.9 % SODIUM CHLORIDE 0.9 %
1000 INTRAVENOUS SOLUTION INTRAVENOUS ONCE
Status: DISCONTINUED | OUTPATIENT
Start: 2020-08-29 | End: 2020-09-02 | Stop reason: HOSPADM

## 2020-08-29 RX ORDER — POTASSIUM CHLORIDE 750 MG/1
20 TABLET, FILM COATED, EXTENDED RELEASE ORAL DAILY
Status: ON HOLD | COMMUNITY
Start: 2020-08-11 | End: 2020-09-02 | Stop reason: SDUPTHER

## 2020-08-29 RX ORDER — MIDODRINE HYDROCHLORIDE 5 MG/1
5 TABLET ORAL
Status: DISCONTINUED | OUTPATIENT
Start: 2020-08-29 | End: 2020-09-01

## 2020-08-29 RX ORDER — LIDOCAINE HYDROCHLORIDE 20 MG/ML
JELLY TOPICAL PRN
Status: DISCONTINUED | OUTPATIENT
Start: 2020-08-29 | End: 2020-09-02 | Stop reason: HOSPADM

## 2020-08-29 RX ORDER — CARVEDILOL 25 MG/1
25 TABLET ORAL 2 TIMES DAILY
Status: DISCONTINUED | OUTPATIENT
Start: 2020-08-29 | End: 2020-09-02 | Stop reason: HOSPADM

## 2020-08-29 RX ORDER — MORPHINE SULFATE 2 MG/ML
2 INJECTION, SOLUTION INTRAMUSCULAR; INTRAVENOUS EVERY 4 HOURS PRN
Status: DISCONTINUED | OUTPATIENT
Start: 2020-08-29 | End: 2020-09-02 | Stop reason: HOSPADM

## 2020-08-29 RX ADMIN — SODIUM CHLORIDE 3000 ML: 900 IRRIGANT IRRIGATION at 13:15

## 2020-08-29 RX ADMIN — TC 99M MEDRONATE 27 MILLICURIE: 20 INJECTION, POWDER, LYOPHILIZED, FOR SOLUTION INTRAVENOUS at 11:00

## 2020-08-29 RX ADMIN — Medication: at 10:07

## 2020-08-29 RX ADMIN — SODIUM CHLORIDE 3000 ML: 900 IRRIGANT IRRIGATION at 12:45

## 2020-08-29 RX ADMIN — SODIUM CHLORIDE 3000 ML: 900 IRRIGANT IRRIGATION at 14:36

## 2020-08-29 RX ADMIN — LIDOCAINE HYDROCHLORIDE: 20 JELLY TOPICAL at 12:22

## 2020-08-29 RX ADMIN — TAMSULOSIN HYDROCHLORIDE 0.4 MG: 0.4 CAPSULE ORAL at 08:40

## 2020-08-29 RX ADMIN — SODIUM CHLORIDE, PRESERVATIVE FREE 10 ML: 5 INJECTION INTRAVENOUS at 08:46

## 2020-08-29 ASSESSMENT — ENCOUNTER SYMPTOMS
NAUSEA: 0
WHEEZING: 0
BLOOD IN STOOL: 0
ABDOMINAL PAIN: 0
DIARRHEA: 0
SHORTNESS OF BREATH: 1
CONSTIPATION: 0
COUGH: 0
VOMITING: 0
STRIDOR: 0

## 2020-08-29 ASSESSMENT — PAIN SCALES - GENERAL: PAINLEVEL_OUTOF10: 0

## 2020-08-29 NOTE — PROGRESS NOTES
Urology Progress Note    Subjective: Saleem Soria is a 80 y.o. male. His/Her current Diet is: DIET GENERAL;. The patient is * No surgery found * from     No acute events overnight. No chest pain, shortness of breath, nausea, vomiting, fevers, chills  Tolerating Panchal catheter. Hand irrigated for clots overnight  Denies any family history of cancer  UOP 300cc/hr    Patient Vitals for the past 24 hrs:   BP Temp Temp src Pulse Resp SpO2 Height Weight   08/29/20 0745 (!) 106/49 97.7 °F (36.5 °C) Oral 60 15 94 % -- --   08/28/20 1945 (!) 122/53 97.4 °F (36.3 °C) Oral 68 18 96 % 5' 7\" (1.702 m) 217 lb 8 oz (98.7 kg)       Intake/Output Summary (Last 24 hours) at 8/29/2020 0805  Last data filed at 8/29/2020 9654  Gross per 24 hour   Intake --   Output 4100 ml   Net -4100 ml       Recent Labs     08/28/20  2106   WBC 5.5   HGB 8.8*   HCT 28.8*   MCV 98.0   *     Recent Labs     08/28/20  2106      K 5.2      CO2 18*   *   CREATININE 7.76*       Recent Labs     08/29/20  0428   COLORU RED*   PHUR 7.5   WBCUA 0 TO 2   RBCUA TOO NUMEROUS TO COUNT   MUCUS NOT REPORTED   TRICHOMONAS NOT REPORTED   YEAST NOT REPORTED   BACTERIA NOT REPORTED   SPECGRAV 1.015   LEUKOCYTESUR SMALL*   UROBILINOGEN ELEVATED*   BILIRUBINUR NEGATIVE       Physical Exam:     NAD, AOx3  RRR.  Peripheral pulses palpable  Respirations nonlabored, symmetric chest rise bilaterally  Abdomen: soft, nontender, nondistended  Lower extremities: No edema of calf tenderness bilaterally  Panchal 16F draining red tinged urine    Interval Imaging Findings:    Imaging was independently reviewed and checked with radiologist report    Impression:      Saleem Soria is a 80 y.o. male admitted with      Problem List  - Urinary Retention, Panchal for 3L  - Bilateral hydroureteronephrosis  - Post obstructive diuresis, physiologic  - Gross hematuria  - Elevated PSA, 97  - Osseus lesion on L1 and R Iliac bone  - Anemia, unclear

## 2020-08-29 NOTE — PLAN OF CARE
Problem: Falls - Risk of:  Goal: Will remain free from falls  Description: Will remain free from falls  8/29/2020 0548 by Messi Owens RN  Outcome: Ongoing  8/28/2020 2046 by Messi Owens RN  Outcome: Ongoing  Goal: Absence of physical injury  Description: Absence of physical injury  8/29/2020 0548 by Messi Owens RN  Outcome: Ongoing  8/28/2020 2046 by Messi Owens RN  Outcome: Ongoing     Problem: Fluid Volume:  Goal: Hemodynamic stability will improve  Description: Hemodynamic stability will improve  8/29/2020 0548 by Messi Owens RN  Outcome: Ongoing  8/28/2020 2046 by Messi Owens RN  Outcome: Ongoing  Goal: Ability to maintain a balanced intake and output will improve  Description: Ability to maintain a balanced intake and output will improve  8/29/2020 0548 by Messi Owens RN  Outcome: Ongoing  8/28/2020 2046 by Messi Owens RN  Outcome: Ongoing     Problem: Health Behavior:  Goal: Identification of resources available to assist in meeting health care needs will improve  Description: Identification of resources available to assist in meeting health care needs will improve  8/29/2020 0548 by Messi Owens RN  Outcome: Ongoing  8/28/2020 2046 by Messi Owens RN  Outcome: Ongoing     Problem: Respiratory:  Goal: Respiratory status will improve  Description: Respiratory status will improve  8/29/2020 0548 by Messi Owens RN  Outcome: Ongoing  8/28/2020 2046 by Messi Owens RN  Outcome: Ongoing     Problem: OXYGENATION/RESPIRATORY FUNCTION  Goal: Patient will maintain patent airway  8/29/2020 0548 by Messi Owens RN  Outcome: Ongoing  8/28/2020 2046 by Messi Owens RN  Outcome: Ongoing  Goal: Patient will achieve/maintain normal respiratory rate/effort  Description: Respiratory rate and effort will be within normal limits for the patient  8/29/2020 0548 by Messi Owens RN  Outcome: Ongoing  8/28/2020 2046 by Messi Owens RN  Outcome: Ongoing     Problem: HEMODYNAMIC STATUS  Goal: Patient has stable vital signs and fluid balance  8/29/2020 0548 by Carlos Bernardo RN  Outcome: Ongoing  8/28/2020 2046 by Carlos Bernarod RN  Outcome: Ongoing     Problem: FLUID AND ELECTROLYTE IMBALANCE  Goal: Fluid and electrolyte balance are achieved/maintained  8/29/2020 0548 by Carlos Bernardo RN  Outcome: Ongoing  8/28/2020 2046 by Carlos Bernardo RN  Outcome: Ongoing     Problem: ACTIVITY INTOLERANCE/IMPAIRED MOBILITY  Goal: Mobility/activity is maintained at optimum level for patient  8/29/2020 0548 by Carlos Bernardo RN  Outcome: Ongoing  8/28/2020 2046 by Carlos Bernardo RN  Outcome: Ongoing     Problem: Skin Integrity:  Goal: Will show no infection signs and symptoms  Description: Will show no infection signs and symptoms  Outcome: Ongoing  Goal: Absence of new skin breakdown  Description: Absence of new skin breakdown  Outcome: Ongoing

## 2020-08-29 NOTE — PLAN OF CARE
Problem: Falls - Risk of:  Goal: Will remain free from falls  Description: Will remain free from falls  8/29/2020 1946 by Fernanda Salgado RN  Outcome: Ongoing  8/29/2020 0548 by Mary Alejandra RN  Outcome: Ongoing  Goal: Absence of physical injury  Description: Absence of physical injury  8/29/2020 1946 by Fernanda Salgado RN  Outcome: Ongoing  8/29/2020 0548 by Mary Alejandra RN  Outcome: Ongoing     Problem: Fluid Volume:  Goal: Hemodynamic stability will improve  Description: Hemodynamic stability will improve  8/29/2020 1946 by Fernanda Salgado RN  Outcome: Ongoing  8/29/2020 0548 by Mary Alejandra RN  Outcome: Ongoing  Goal: Ability to maintain a balanced intake and output will improve  Description: Ability to maintain a balanced intake and output will improve  8/29/2020 1946 by Fernanda Salgado RN  Outcome: Ongoing  8/29/2020 0548 by Mary Alejandra RN  Outcome: Ongoing     Problem: Health Behavior:  Goal: Identification of resources available to assist in meeting health care needs will improve  Description: Identification of resources available to assist in meeting health care needs will improve  8/29/2020 1946 by Fernanda Salgado RN  Outcome: Ongoing  8/29/2020 0548 by Mary Alejandra RN  Outcome: Ongoing     Problem: Respiratory:  Goal: Respiratory status will improve  Description: Respiratory status will improve  8/29/2020 1946 by Fernanda Salgado RN  Outcome: Ongoing  8/29/2020 0548 by Mary Alejandra RN  Outcome: Ongoing     Problem: OXYGENATION/RESPIRATORY FUNCTION  Goal: Patient will maintain patent airway  8/29/2020 1946 by Fernanda Salgado RN  Outcome: Ongoing  8/29/2020 0548 by Mary Alejandra RN  Outcome: Ongoing  Goal: Patient will achieve/maintain normal respiratory rate/effort  Description: Respiratory rate and effort will be within normal limits for the patient  8/29/2020 1946 by Fernanda Salgado RN  Outcome: Ongoing  8/29/2020 0548 by Mary Alejandra RN  Outcome: Ongoing     Problem: HEMODYNAMIC STATUS  Goal: Patient has stable vital signs and fluid balance  8/29/2020 1946 by Logan Foley RN  Outcome: Ongoing  8/29/2020 0548 by Elias Duran RN  Outcome: Ongoing     Problem: FLUID AND ELECTROLYTE IMBALANCE  Goal: Fluid and electrolyte balance are achieved/maintained  8/29/2020 1946 by Logan Foley RN  Outcome: Ongoing  8/29/2020 0548 by Elias Duran RN  Outcome: Ongoing     Problem: ACTIVITY INTOLERANCE/IMPAIRED MOBILITY  Goal: Mobility/activity is maintained at optimum level for patient  8/29/2020 1946 by Logan Foley RN  Outcome: Ongoing  8/29/2020 0548 by Elias Duran RN  Outcome: Ongoing     Problem: Skin Integrity:  Goal: Will show no infection signs and symptoms  Description: Will show no infection signs and symptoms  8/29/2020 1946 by Logna Foley RN  Outcome: Ongoing  8/29/2020 0548 by Elias Duran RN  Outcome: Ongoing  Goal: Absence of new skin breakdown  Description: Absence of new skin breakdown  8/29/2020 1946 by Logan Foley RN  Outcome: Ongoing  8/29/2020 0548 by Elias Duran RN  Outcome: Ongoing

## 2020-08-29 NOTE — PROGRESS NOTES
Call placed to Dr. Nico Mallory regarding patient needing for catheter to be flushed more often. Order received for 3 way irrigation catheter. Catheter placed per DILCIA West.

## 2020-08-29 NOTE — H&P
[facility]  Office: [address], DO, Deondre Fry, DO, Patito Hoda, DO, Nikiavirgilio Carrilloizaiah Blood, DO, Deion Hughes MD, Dixon Hendricks MD, Santhosh Stinson MD, Charan Su MD, Mariya Mena MD, Jonathan Ferreira MD, Devon Coker MD, Mateo Asif MD, Tanna Villalobos MD, Jeyson Castellon DO, Zafar Franco MD, Ck Miller MD, Rajan Godinez, DO, Anna Kc MD,  Bharati Larsen, DO, Noe Melchor MD, Keven Stringer MD, Megan Etienne, [facility], [address], [facility], Kusum Hoyos, CNP, Linnea Fontenot, CNS, Yokasta Marx, CNP, Savanah Woodard, CNP, Marquise Denny, CNP, Diann Alejandra, CNP, Ermias Montesinos, CNP, Ramiro Estrada PA-C, Hoang Brooks, [facility], Wilner Chance, CNP, Ray Diaz, CNP, Jorje Laboy, CNP, Wilmary , CNP, Sebastian Mendez, [address]    HISTORY AND PHYSICAL EXAMINATION            Date:   2020  Patient name:  Bee Ramos  Date of admission:  2020  7:53 PM  MRN:   [id number]  Account:  [de-identified]  YOB: 1933  PCP:    BAM Padron CNP  Room:   6266/6571-50  Code Status:    Full Code    Chief Complaint:     Swelling and shortness of breath    History Obtained From:     patient, electronic medical record, later phone conversation with daughter    History of Present Illness:     Bee Ramos is a 80 y.o. Non-/non  male who presents with No chief complaint on file. and is admitted to the hospital for the management of acute renal failure superimposed on chronic kidney disease stage II with Obstructive uropathy with hydroureteronephrosis, and exacerbation of systolic heart failure.       Patient was sent to [place] [address] as a direct admit from [facility].  Patient presented to [facility] for the complaint of increased shortness of breath and swelling of the lower extremities after being called by his provider stating he needed to be seen in the emergency room for the elevated BUN and creatinine on repeat labs after increasing Lasix and potassium secondary to fluid overload acute exacerbation of heart failure in the outpatient setting . Patient states the swelling of the lower extremities  has been ongoing for approximately the past month and was recently referred and seen by cardiologist Dr. Michele Cloud who increased his Lasix to 40 mg and potassium to 20 mEq. However patient did not have any significant improvement in the swelling worsened, he apparently had a fall 2 weeks ago in the grass which he does not describe as syncopal which he states is \"his legs just got weak\", after discussion with his family and encouragement he then decided to seek treatment in the emergency room 2 days after the fall. He currently denies any chest pain, dizziness lightheadedness syncopal or presyncopal symptomology, nausea vomiting diarrhea. He denies any dysuria urinary frequency urgency incomplete emptying or changes in his bowel or bladder, as he states that he has obstipation in which he takes Linzess which then causes diarrhea. Work up in the emergency room      Vitals:    Temp.   97.4 hR. 70     RR. 18     BP.  124/67       SPO2. 94%    Laboratories:   Metabolic panel. -Sodium 040 potassium 5.7 chloride 98 CO2 24 anion gap 20  creatinine 9.8  GI/Liver Panel-alk phos 97 AST 19 ALT 15 total bili 0.06, bili conj 0.1  Hematology. -WBC 5.6 hemoglobin 8.7 hematocrit 27.5 MCV 93 MCH 29 MCHC 31 platelets 439 lymphocytes 0.31.  7 neutrophils 94 lymphocytes 3 monocytes 2 cm 1  Coagulation-  Cardiac Markers-troponin I 0.03.   EKG-sinus rhythm, marked axis deviation, left bundle branch block, DE interval 195 QRS 14  3 seconds, no previous EKG to compare with   urine-urine yellow, negative glucose, negative bilirubin, negative ketones, specific gravity 1.015, hemoglobin trace, pH 6.5, protein negative, nitrate negative, leukocyte negative, bacteria none white blood cells 0-2 RBCs 3.5 epi cells 0.2      Imaging and Diagnostics:   CT abdomen and pelvis without contrast-   Severe distention of the urinary bladder with bilateral hydroureteronephrosis. Diffusely sclerotic appearance of the L1 vertebral body suspicious for metastatic disease. There is also a sclerotic lesion in the right iliac bone. Infrarenal abdominal aortic aneurysm measuring 4.7 cm, aneurysmal sac may contain a calcified dissection flap versus atherosclerotic calcification of mural thrombus. Bilateral pleural effusions  Mildly enlarged prostate-measuring 5.2 x 5.4 cm      While at the outlying emergency room patient was noted to have the elevated creatinine Panchal catheter was placed and instantly returned 2 L of yellow urine in which when it was noted to be 2 L it was instantly clamped and drained, then after a period of time unclamped and returned a liter then had hematuria. Past Medical History:     Past Medical History:   Diagnosis Date    AAA (abdominal aortic aneurysm) ([facility] [address]     BPH (benign prostatic hyperplasia)     CAD (coronary artery disease)     Hyperlipidemia     Skin cancer, basal cell     Stroke (cerebrum) (HCC)     Systolic heart failure (HCC)       Patient and family deny history of chronic kidney disease however July 2020 creatinine was 1.96, BUN of 39  Past Surgical History:     Past Surgical History:   Procedure Laterality Date    CORONARY ARTERY BYPASS GRAFT      cabg x 4 in [facility]      Biopsy        Medications Prior to Admission:     Prior to Admission medications    Medication Sig Start Date End Date Taking?  Authorizing Provider   tamsulosin (FLOMAX) 0.4 MG capsule Take 0.4 mg by mouth daily   Yes Historical Provider, MD   carvedilol (COREG) 25 MG tablet Take 25 mg by mouth 2 times daily   Yes Historical Provider, MD   sacubitril-valsartan (ENTRESTO) 24-26 MG per tablet Take 1 tablet by mouth 2 times daily   Yes Historical Provider, MD   sacubitril-valsartan (ENTRESTO)  MG per tablet Take 1 tablet by mouth 2 times daily   Yes Historical Provider, MD   linaclotide (LINZESS) 145 MCG capsule Take 145 mcg by mouth every morning (before breakfast)   Yes Historical Provider, MD   rosuvastatin (CRESTOR) 10 MG tablet Take 10 mg by mouth daily   Yes Historical Provider, MD   aspirin 81 MG EC tablet Take 81 mg by mouth daily   Yes Historical Provider, MD        Allergies:     Patient has no known allergies. Social History:     Tobacco:    reports that he quit smoking about 54 years ago. His smoking use included cigarettes. He quit after 15.00 years of use. He has never used smokeless tobacco.  Alcohol:      reports current alcohol use. Drug Use:  reports no history of drug use. Family History:     Family History   Problem Relation Age of Onset    Heart Disease Mother     Hypertension Mother     Heart Disease Father     Heart Disease Brother        Review of Systems:     Positive and Negative as described in HPI. Review of Systems   Constitutional: Positive for activity change and fatigue. Negative for chills, diaphoresis and fever. HENT: Negative for congestion and hearing loss. Eyes: Negative for visual disturbance. Respiratory: Positive for shortness of breath. Negative for cough, wheezing and stridor. Cardiovascular: Positive for leg swelling. Negative for chest pain and palpitations. Gastrointestinal: Negative for abdominal pain, blood in stool, constipation, diarrhea, nausea and vomiting. Genitourinary: Negative for dysuria and frequency. Musculoskeletal: Negative for myalgias. Skin: Negative for rash. Neurological: Negative for dizziness, seizures and headaches. Psychiatric/Behavioral: The patient is not nervous/anxious.         Physical Exam:   BP (!) 122/53   Pulse 68   Temp 97.4 °F (36.3 °C) (Oral)   Resp 18   Ht 5' 7\" (1.702 m)   Wt 217 lb 8 oz (98.7 kg)   SpO2 96%   BMI 34.07 kg/m²   Temp (24hrs), Av.4 °F (36.3 °C), Min:97.4 °F (36.3 °C), Max:97.4 °F (36.3 °C)    No results for input(s): POCGLU in the last 72 hours. No intake or output data in the 24 hours ending 20    Physical Exam  Vitals signs and nursing note reviewed. Constitutional:       General: He is not in acute distress. Appearance: He is well-developed. He is not ill-appearing or diaphoretic. HENT:      Head: Normocephalic and atraumatic. Right Ear: Decreased hearing noted. Left Ear: Decreased hearing noted. Nose: Nose normal. No rhinorrhea. Eyes:      General: Lids are normal.      Extraocular Movements:      Right eye: Normal extraocular motion. Left eye: Normal extraocular motion. Conjunctiva/sclera: Conjunctivae normal.      Right eye: Right conjunctiva is not injected. Left eye: Left conjunctiva is not injected. Pupils: Pupils are equal, round, and reactive to light. Pupils are equal.      Right eye: Pupil is reactive. Left eye: Pupil is reactive. Neck:      Musculoskeletal: Neck supple. Thyroid: No thyromegaly. Vascular: No carotid bruit. Trachea: Trachea and phonation normal. No tracheal deviation. Cardiovascular:      Rate and Rhythm: Normal rate and regular rhythm. Pulses: Normal pulses. Heart sounds: Murmur present. Pulmonary:      Effort: Pulmonary effort is normal. Tachypnea (With activity) present. No respiratory distress. Breath sounds: No stridor. Examination of the right-middle field reveals rales. Examination of the left-middle field reveals rales. Examination of the right-lower field reveals rales. Examination of the left-lower field reveals rales. Wheezing and rales present. No decreased breath sounds. Abdominal:      General: Abdomen is protuberant. Bowel sounds are normal. There is no distension. Palpations: Abdomen is soft. There is no mass. Tenderness: There is no abdominal tenderness. There is no guarding. Genitourinary:     Comments: Panchal catheter with hematuria  Musculoskeletal:         General: No tenderness. Right lower leg: 3+ Edema present. Left lower leg: 3+ Edema present. Comments: Moves all extremities   Skin:     General: Skin is warm and dry. Findings: No erythema, lesion or rash. Neurological:      General: No focal deficit present. Mental Status: He is alert and oriented to person, place, and time. He is not disoriented. GCS: GCS eye subscore is 4. GCS verbal subscore is 5. GCS motor subscore is 6. Cranial Nerves: No cranial nerve deficit or dysarthria. Sensory: No sensory deficit. Motor: Motor function is intact. Psychiatric:         Attention and Perception: Attention normal.         Speech: Speech normal.         Behavior: Behavior normal. Behavior is cooperative. Investigations:      Laboratory Testing:  No results found for this or any previous visit (from the past 24 hour(s)). Imaging/Diagnostics:  CTA and Pelvis showed on 9/10/19 impression:   1. There is a saccular aneurysm originating off the right lateral aspect of the proximal infrarenal abdominal aorta as detailed above. No aneurysm rupture   2. CT findings and raise the question of lateral obstruction (concentric thickening and trabeculation of the urinary bladder wall in the setting of moderately enlarged prostate gland). 3. Mild to moderate calcified atherosclerotic changes including involvement of the coronary arteries. Status post median sternotomy   4. Osteopenia/osteoporosis. Changes of ankylosing spondylitis. MRI done of his spine and lumbar without contrast 8/14/19 showed:    1. Multilevel degenerative changes with moderate to severe relative spinal canal narrowing at L5-S1 with crowding the S1 nerve roots.     2. Signal abnormality in the T11-T12 disc space and adjacent vertebral body endplates related to the acute kidney injury will monitor and adjust diuretics as kidney function improves after Payne catheter and obstruction removal.  At home he is on Entresto, Coreg, declined ICD in the past, last echocardiogram last ef 35% , proBNP zoe 5, 746 while on Entresto 7/2020 .   6. Anemia- previous GIB- duodenum , check iron profile and stool OB, has hematuria after payne insertion, most likey post traumatic -hold off on a/c until clear picture from hematuria and anemia. 12.9 1 year ago. 7. Sclerotic bone lesion on CT-noted L1 vertebral body and iliac bone, denies chronic bone pain had an MRI 10/19 as above that showed Signal abnormality in the T11-T12 disc space and adjacent vertebral body endplates is worrisome for inflammation. Correlate with possible signs or symptoms of infection (discitis). 8. Elevated troponin/ Coronary artery disease with CABG- cabg x 4 in 2000 in [place]. No current chest pain, continue with statin, bb, trend trops with ekg for changes, elevation of trops could be secondary to demand vs elevated creat-   9. Left bundle branch block-ER had no previous EKGs to compare [email or web address] and obtain EKG done in office. Continue to trend out cardiac enzymes and monitor for chest pain or anginal symptomology, if abnormal consult cardiology  10. Hematuria- most likely post traumatic from the insertion of payne and BPH. Order hand irrigation of payne cath until clear and then monitor for output and clots. 11. AAA- Not new, not a significant change to previous CT   12.  GI proph  13. dvt proph- hold off on a/c at this time as a concern the hgb is significantly dropped from last available 1 year ago.       . Consultations:   IP CONSULT TO UROLOGY     Patient is admitted as inpatient status because of co-morbidities listed above, severity of signs and symptoms as outlined, requirement for current medical therapies and most importantly because of direct risk to patient if care not provided in a hospital setting. Expected length of stay > 48 hours.     Johanny Bahena, APRN - [address] [facility]  8/28/2020  8:41 PM    Copy sent to BAM Mayorga - CNP

## 2020-08-29 NOTE — PLAN OF CARE
I had a pleasant conversation with daughter and wife regarding Mr. Blanco Cuff urinary retention, PSA level, blood in the urine, and kidney failure. We discussed that his PSA elevation does not necessarily mean prostate cancer, but a further workup is warranted. I did mention the bone lesions that were seen on the CT scan. We are seeing recovery of his kidney function, but discussed we will monitor Cr and maintain the catheter at this time. All questions were answered.     Kaity Holland MD  Urology

## 2020-08-29 NOTE — CONSULTS
Jenny Hernández, Tj Leija, Roxy Patterson, Nancy, & Rubén   Urology Consultation      Patient:  Shahrzad Aguirre  MRN: [id number]  YOB: 1933    CHIEF COMPLAINT:  Obstructive uropathy with hydroureteronephrosis    HISTORY OF PRESENT ILLNESS:   The patient is a 80 y.o. male who presents who was a transfer from [facility] for acute kidney injury. He showed up to [facility] for a fall that occurred two days ago. At the ED they obtained labs which showed a creatinine of 9.8. CT A/P was obtained which showed bilateral hydroureteronephrosis with severe distention of the urinary bladder. There was also a diffusely sclerotic appearance of the L1 vertebral body suspicious for metastatic disease with also a sclerotic lesion in the right iliac bone. A mildly enlarged prostate was also noted on outside CT. Panchal was place at ED, which 2L initial output was obtained. Panchal was clamped and after unclamping 1L was obtained and cherry colored urine was returned. He states he did have an episode of urinary retention after his CAGB many years ago. He is on flomax already. He states he does not see a urologist. He has never had gross hematuria in the past. He denies any fever, chills, chest pain, shortness of breath, abdominal pain, or bone pain. He has never obtained a PSA in the past that he knows off. PSA here was 97.66. He does have a 15 year smoking history, he last quit in 1966. He is an , he is unsure of chemical exposures, no exposure to agent orange. Patient's old records, notes and chart reviewed and summarized above.     Past Medical History:    Past Medical History:   Diagnosis Date    AAA (abdominal aortic aneurysm) ([facility] [address]     BPH (benign prostatic hyperplasia)     CAD (coronary artery disease)     Hyperlipidemia     Systolic heart failure (HCC)        Past Surgical History:    Past Surgical History:   Procedure Laterality Date    CORONARY ARTERY BYPASS GRAFT together: Not on file     Attends [employer] service: Not on file     Active member of club or organization: Not on file     Attends meetings of clubs or organizations: Not on file     Relationship status: Not on file    Intimate partner violence     Fear of current or ex partner: Not on file     Emotionally abused: Not on file     Physically abused: Not on file     Forced sexual activity: Not on file   Other Topics Concern    Not on file   Social History Narrative    Not on file       Family History:  No family history on file. REVIEW OF SYSTEMS:  A comprehensive 14 point review of systems was obtained. Constitutional: No fatigue  Eyes: No blurry vision  Ears, nose, mouth, throat, face: No ringing in the ears; no facial droop. Respiratory: No cough or cold. Cardiovascular: No palpitations  Gastrointestinal: No diarrhea or constipation. Genitourinary: No burning with urination  Integument/Skin: No rashes  Hematologic/Lymphatic: No easy bruising  Musculoskeletal: No muscle pains, leg swelling. Neurologic: No weakness in the extremities. Psychiatric: No depression or suicidal thoughts. Endocrine: No heat or cold intolerances. Allergic/Immunologic: No current seasonal allergies; no skin hives. Physical Exam:      This a 80 y.o. female   Vitals:    08/28/20 1945   BP: (!) 122/53   Pulse: 68   Resp: 18   Temp: 97.4 °F (36.3 °C)   SpO2: 96%     Constitutional: Patient in no acute distress. Neuro: alert and oriented to person place and time. Head: Atraumatic and normocephalic. Neck: Trachea midline. Ext: 2+ radial pulses bilaterally. Psych: Mood and affect normal.  Skin: No rashes or bruising present. Lungs: Respiratory effort normal.  Cardiovascular:  Regular rhythm. Abdomen: Soft, non-tender, non-distended. Obese. Neither side has CVA tenderness on exam.  Bladder non-tender and not distended.   : payne in place with gross hematuria       Labs:  Recent Labs     08/28/20  2106   WBC 5.5   HGB 8.8*   HCT 28.8*   MCV 98.0   *     Recent Labs     08/28/20  2106      K 5.2      CO2 18*   *   CREATININE 7.76*       No results for input(s): COLORU, PHUR, LABCAST, WBCUA, RBCUA, MUCUS, TRICHOMONAS, YEAST, BACTERIA, CLARITYU, SPECGRAV, LEUKOCYTESUR, UROBILINOGEN, BILIRUBINUR, BLOODU in the last 72 hours. Invalid input(s): NITRATE, GLUCOSEUKETONESUAMORPHOUS        -----------------------------------------------------------------  Imaging Results:  Us Retroperitoneal Limited    Result Date: 8/28/2020  EXAMINATION: ULTRASOUND OF THE KIDNEYS 8/28/2020 9:25 pm COMPARISON: None. HISTORY: ORDERING SYSTEM PROVIDED HISTORY: carla TECHNOLOGIST PROVIDED HISTORY: Bilateral Renal Ultrasound carla Initial evaluation. FINDINGS: Evaluation is limited due to patient body habitus, overlying bowel gas and rib shadowing. The right kidney measures 12.2 cm in length and the left kidney measures 11.8 cm in length. Kidneys demonstrate normal cortical echogenicity. There is moderate right-sided hydronephrosis. Questionable mild left-sided hydronephrosis. No focal renal mass is clearly demonstrated. 1. Evaluation is limited due to patient body habitus as well as bowel gas and rib shadowing. 2. There appears to be moderate right and mild left-sided hydronephrosis. 3. Otherwise, no gross sonographic abnormality seen of the kidneys.        Assessment and Plan   Impression:    80year old male   Active  problem list  Obstructive uropathy with bilateral ureterohydronephrosis   Acute urinary retention  Elevated PSA of 97.66  Hx of acute urinary retention  Hx of BPH on flomax      Plan:   Continue payne catheter, to allow for maximal  decompression until creatinine nadirs  Creatining 7.76, trend creatinine  Minimize constipation, recommend scheduled bowel regimen  Minimize pain medication and anticholingerics as able   Irrigate payne PRN if payne not draining - possible traumatic payne catheter placement Monitor for post obstructive diuresis, monitor electrolytes   Minimize nephrotoxic medications  Elevated PSA in setting of acute urinary retention NOT reliable indicator of prostate cancer. Kary Springer MD   Urology Resident PGY 2    I have reviewed the history above and agree. I have repeated the key portions of the physical exam and concur with the resident's findings. I have reviewed all laboratory findings and imaging reports/films. I agree with the plan as noted above.     Electronically signed by Andrez White MD on 8/29/2020 at 2:05 PM

## 2020-08-29 NOTE — PROGRESS NOTES
[unfilled]    [facility] [address]    Progress Note    8/29/2020    6:43 PM    Name:   Mell Ku  MRN:     [id number]     Acct:      [de-identified]   Room:   [address] [place] [address] Day:  1  Admit Date:  8/28/2020  7:53 PM    PCP:   BAM Kathleen CNP  Code Status:  Full Code    Subjective:     C/C: No chief complaint on file. Interval History Status: improved. Patient seen and examined in the presence of her daughter and wife. Also reviewed urology consult input. Patient has been started on CBI. 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 Patient denies any pain, fever or chills. Brief History:     Mell Ku is a 80 y.o. Non-/non  male who presents with No chief complaint on file. and is admitted to the hospital for the management of acute renal failure superimposed on chronic kidney disease stage II with Obstructive uropathy with hydroureteronephrosis, and exacerbation of systolic heart failure. Patient was sent to [place] [address] as a direct admit from [facility].  Patient presented to [facility] for the complaint of increased shortness of breath and swelling of the lower extremities after being called by his provider stating he needed to be seen in the emergency room for the elevated BUN and creatinine on repeat labs after increasing Lasix and potassium secondary to fluid overload acute exacerbation of heart failure in the outpatient setting . Patient states the swelling of the lower extremities  has been ongoing for approximately the past month and was recently referred and seen by cardiologist Dr. Winter Mondragon who increased his Lasix to 40 mg and potassium to 20 mEq.   However patient did not have any significant improvement in the swelling worsened, he apparently had a fall 2 weeks ago in the grass which he does not describe as syncopal which he states is \"his legs just got weak\", after discussion with his family and encouragement he then decided to seek treatment in the emergency room 2 days after the fall. He currently denies any chest pain, dizziness lightheadedness syncopal or presyncopal symptomology, nausea vomiting diarrhea. He denies any dysuria urinary frequency urgency incomplete emptying or changes in his bowel or bladder, as he states that he has obstipation in which he takes Linzess which then causes diarrhea. Review of Systems:     Constitutional:  negative for chills, fevers, sweats  Respiratory:  negative for cough, dyspnea on exertion, shortness of breath, wheezing  Cardiovascular:  negative for chest pain, chest pressure/discomfort, lower extremity edema, palpitations  Gastrointestinal:  negative for abdominal pain, constipation, diarrhea, nausea, vomiting  Neurological:  negative for dizziness, headache    Medications:      Allergies:  No Known Allergies    Current Meds:   Scheduled Meds:    [Held by provider] carvedilol  25 mg Oral BID    tamsulosin  0.4 mg Oral Daily    [Held by provider] aspirin  81 mg Oral Daily    sodium chloride  1,000 mL Intravenous Once    midodrine  5 mg Oral TID WC    sodium chloride flush  10 mL Intravenous 2 times per day     Continuous Infusions:    sodium bicarbonate infusion 50 mL/hr at 08/29/20 1007    sodium chloride       PRN Meds: lidocaine, morphine, sodium chloride flush, acetaminophen **OR** acetaminophen, polyethylene glycol, promethazine **OR** ondansetron, nicotine    Data:     Past Medical History:   has a past medical history of AAA (abdominal aortic aneurysm) ([facility] [address] BPH (benign prostatic hyperplasia), CAD (coronary artery disease), GIB (gastrointestinal bleeding), Hyperlipidemia, Skin cancer, basal cell, Stroke (cerebrum) ([facility] [address] and Systolic heart failure ([facility] Social History:   reports that he quit smoking about 54 years ago. His smoking use included cigarettes. He quit after 15.00 years of use. He has never used smokeless tobacco. He reports current alcohol use. He reports that he does not use drugs. Family History:   Family History   Problem Relation Age of Onset    Heart Disease Mother     Hypertension Mother     Heart Disease Father     Heart Disease Brother        Vitals:  BP (!) 122/37 Comment: rn in the room  Pulse 73   Temp 98.4 °F (36.9 °C) (Oral)   Resp 26   Ht 5' 7\" (1.702 m)   Wt 217 lb 8 oz (98.7 kg)   SpO2 96%   BMI 34.07 kg/m²   Temp (24hrs), Av °F (36.7 °C), Min:97.4 °F (36.3 °C), Max:98.5 °F (36.9 °C)    No results for input(s): POCGLU in the last 72 hours. I/O (24Hr):     Intake/Output Summary (Last 24 hours) at 2020 1843  Last data filed at 2020 1514  Gross per 24 hour   Intake --   Output 4350 ml   Net -4350 ml       Labs:  Hematology:  Recent Labs     20  0954   WBC 5.5 5.4   RBC 2.94* 2.96*   HGB 8.8* 8.5*   HCT 28.8* 28.1*   MCV 98.0 94.9   MCH 29.9 28.7   MCHC 30.6 30.2   RDW 13.9 13.9   * 157   MPV 10.4 10.6     Chemistry:  Recent Labs     20  0105 20  0607 20  0954 20  1238     --   --  143 139   K 5.2  --   --  4.3 4.5     --   --  104 104   CO2 18*  --   --  21 16*   GLUCOSE 123*  --   --  100* 158*   *  --   --  92* 94*   CREATININE 7.76*  --   --  6.01* 6.04*   MG  --   --   --  2.3  --    ANIONGAP 19*  --   --  18* 19*   LABGLOM 7*  --   --  9* 9*   GFRAA 8*  --   --  11* 11*   CALCIUM 7.5*  --   --  7.5* 7.6*   PHOS  --   --   --  5.9*  --    PSA 97.66*  --   --   --   --    PROBNP 12,952*  --   --  10,832*  --    TROPHS 103* 105* 105*  --  106*   CKTOTAL  --   --   --  99  --    MYOGLOBIN 127* 124* 113*  --  140*     Recent Labs     20  2106 20  0954 20  1238   PROT 6.1* 6.0* 5.4*   LABALBU 3.4* 3.5  3.5  --    AST 12 11  --    ALT 11 10  --    ALKPHOS 93 93  --    BILITOT 0.44 0.56  --    BILIDIR 0.24 0.27  --      ABG:No results found for: POCPH, PHART, PH, POCPCO2, SUE3GEU, PCO2, POCPO2, PO2ART, PO2, POCHCO3, AWO6KHH, HCO3, NBEA, PBEA, BEART, BE, THGBART, THB, OBY8ROG, LQEY3HBJ, L3XSIXNU, O2SAT, FIO2  No results found for: SPECIAL  No results found for: CULTURE    Radiology:  Nm Bone Scan Whole Body    Result Date: 8/29/2020  Findings consistent with 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 Question small lesions within the ribs. Us Retroperitoneal Limited    Result Date: 8/28/2020  1. Evaluation is limited due to patient body habitus as well as bowel gas and rib shadowing. 2. There appears to be moderate right and mild left-sided hydronephrosis. 3. Otherwise, no gross sonographic abnormality seen of the kidneys.        Physical Examination:        General appearance:  alert, cooperative and no distress  Mental Status:  oriented to person, place and time and normal affect  Lungs:  clear to auscultation bilaterally, normal effort  Heart:  regular rate and rhythm, no murmur  Abdomen:  soft, nontender, nondistended, normal bowel sounds, no masses, hepatomegaly, splenomegaly  Extremities:  no edema, redness, tenderness in the calves  Skin:  no gross lesions, rashes, induration    Assessment:        Hospital Problems           Last Modified POA    Acute renal failure ([address] 8/28/2020 Yes    Hydroureteronephrosis 8/28/2020 Yes    Hyperkalemia 8/28/2020 Yes    Acute on chronic systolic heart failure ([address] 8/28/2020 Yes    Coronary artery disease involving coronary bypass graft of native heart without angina pectoris 8/28/2020 Yes    Benign prostatic hyperplasia with urinary obstruction 8/28/2020 Yes    Abdominal aortic aneurysm (AAA) without rupture ([address] 8/29/2020 Yes    LBBB (left bundle branch block) 8/29/2020 Yes    Gross hematuria 8/29/2020 Yes    CKD (chronic kidney disease) stage 3,

## 2020-08-29 NOTE — CONSULTS
Nephrology Consult Note    Reason for Consult: ASHLEY on CKD  Requesting Physician:  Dr. Angela Prasad    Chief Complaint: Swelling and shortness of breath. History Obtained From:  patient, electronic medical record    History of Present Illness: This is a 80 y.o. male who presented to the hospital for evaluation of shortness of breath and swelling. Admitted to the hospital for management of acute renal failure superimposed by chronic kidney disease stage, with obstructive uropathy with hydro-nephrosis and exacerbation of systolic heart failure. Patient was a direct admit from [facility] presented to the [facility] with complaints of increased shortness of breath and lower extremity swelling of extremities after being called by his provider stating he need to be seen in the emergency room due to the elevated BUN/creatinine on repeat labs after increasing Lasix and potassium secondary to fluid overload and acute exacerbation of heart failure in the outpatient setting. The lower extremity swellings started about a month ago and was recently seen by his cardiologist Dr. Kalina Keller increase his Lasix to 40 mg and his potassium supplement to 20 mEq daily. There was no significant improvement of the lower extremity swelling actually it worsened and he apparently fell 2 weeks ago in the grass and does not describe it as a syncopal episode just of his legs got weak. At the time of admission he denied chest pain dizziness lightheadedness syncopal or presyncopal symptomology as well as any nausea vomiting diarrhea. Denies any dysuria urinary frequency urinary incomplete emptying changes in the bowel or bladder and/or any constipation due to he does have an IBS-D which he takes Linzess for. Initial lab work noted sodium 136, potassium 5.7, chloride 98, CO2 24 anion gap of 20  and creatinine 9.8.   Hematology labs reviewed a white blood cell count of 5.6 hemoglobin 8.7 hematocrit 27.5.  Elevated PSA of 97.66. EKG showed similar sinus rhythm with marked axis deviation left lateral branch block. Imaging noted were obtained of the CT abdomen without contrast severe distention of the urinary bladder with bilateral hydroureternephrosis. Diffusely sclerotic appearance of the L1 vertebral body suspicious for metastatic disease. Also has an infrarenal abdominal aortic aneurysm measuring 4.7 cm. Mildly enlarged prostate as well 5.2 x 5.4cm. Panchal placed in outlying facility and insulin return 2 L of urine which was then noted to be clamped and then after period of time unclamped and return with a slight hematuria appearance. Urology was consulted. Renal ultrasound was ordered. Right kidney measures 12.2 cm in length and left kidney measures 11.8 cm in length normal cortical echogenicity with some moderate right-sided hydronephrosis and questionable left-sided hydronephrosis. Urology rounded already this morning note reviewed. Tolerating Panchal catheter hand irrigated for clots overnight urine output has been around 300 cc an hour. Remains off continue bladder irrigation at this time here interrogation of the Panchal. Recommend stopping aspirin and recommend nuclear med bone scan and if positive medical oncology consult. Patient seen and examined at bedside. Stat BMP ordered. Labs reviewed. Outlying facility creatinine was up above 9 admitting creatinine at our hospital was 7.8 and now patient's creatinine is 6.01. Panchal remains in place. Hemoglobin stable at 8.5. Potassium normal.  Urinalysis reviewed red in color trace ketones +3 protein elevated urine bilirubin positive nitrite small leukocyte esterase eosinophils positive. Urine creatinine 37.9 urine osmolality 326 urine sodium 98 total urine protein to 79. Urine protein creatinine ratio 7.36. Total urine output 5.2 liters since admission. Admission weight 98.7 kg.                Pt denies any hx of heavy or prolonged NSAID use. There is no history of blood or bone marrow disorders. There is no hx of jaundice or hepatitis or sexually transmitted disease. Pt has no hx of collagen vascular disease or vasculitis. No history of dysuria or frequency. No recent procedures involving IV contrast. There is no hx of paraprotein disease. Pt denies any hx of recurrent UTI , incontinence or recurrent nephrolithiasis. Medication review shows use of ace/arb, diuretic. Patient's home medication list includes Entresto as well as Lasix 40 mg twice daily. Past Medical History:        Diagnosis Date    AAA (abdominal aortic aneurysm) (HCC)     BPH (benign prostatic hyperplasia)     CAD (coronary artery disease)     GIB (gastrointestinal bleeding)     found to have a bleeding spot in the duodenum cauterized and on iron replacement therapy continue. hb 10.2 in 7/2018 should recheck cbc with dr Lisbeth Fairchild.  Hyperlipidemia     Skin cancer, basal cell     Stroke (cerebrum) (HCC)     Systolic heart failure (HCC)        Past Surgical History:        Procedure Laterality Date    CORONARY ARTERY BYPASS GRAFT      cabg x 4 in 2000 in [facility] [address] SKIN CANCER EXCISION      VOCAL CORD SURGERY      Biopsy       Current Medications:    carvedilol (COREG) tablet 25 mg, BID  tamsulosin (FLOMAX) capsule 0.4 mg, Daily  [Held by provider] aspirin EC tablet 81 mg, Daily  sodium bicarbonate 150 mEq in dextrose 5 % 1,000 mL infusion, Continuous  sodium chloride flush 0.9 % injection 10 mL, 2 times per day  sodium chloride flush 0.9 % injection 10 mL, PRN  acetaminophen (TYLENOL) tablet 650 mg, Q6H PRN    Or  acetaminophen (TYLENOL) suppository 650 mg, Q6H PRN  polyethylene glycol (GLYCOLAX) packet 17 g, Daily PRN  promethazine (PHENERGAN) tablet 12.5 mg, Q6H PRN    Or  ondansetron (ZOFRAN) injection 4 mg, Q6H PRN  nicotine (NICODERM CQ) 21 MG/24HR 1 patch, Daily PRN        Allergies:  Patient has no known allergies.     Social History: Social History     Socioeconomic History    Marital status:      Spouse name: Not on file    Number of children: Not on file    Years of education: Not on file    Highest education level: Not on file   Occupational History    Not on file   Social Needs    Financial resource strain: Not on file    Food insecurity     Worry: Not on file     Inability: Not on file    Transportation needs     Medical: Not on file     Non-medical: Not on file   Tobacco Use    Smoking status: Former Smoker     Years: 15.00     Types: Cigarettes     Last attempt to quit: 1966     Years since quittin.5    Smokeless tobacco: Never Used   Substance and Sexual Activity    Alcohol use: Yes     Frequency: 4 or more times a week     Drinks per session: 1 or 2     Binge frequency: Never    Drug use: Never    Sexual activity: Not on file   Lifestyle    Physical activity     Days per week: Not on file     Minutes per session: Not on file    Stress: Not on file   Relationships    Social connections     Talks on phone: Not on file     Gets together: Not on file     Attends Voodoo service: Not on file     Active member of club or organization: Not on file     Attends meetings of clubs or organizations: Not on file     Relationship status: Not on file    Intimate partner violence     Fear of current or ex partner: Not on file     Emotionally abused: Not on file     Physically abused: Not on file     Forced sexual activity: Not on file   Other Topics Concern    Not on file   Social History Narrative    Not on file       Family History:   Family History   Problem Relation Age of Onset    Heart Disease Mother     Hypertension Mother     Heart Disease Father     Heart Disease Brother        Review of Systems:    Constitutional: No fever, no chills, no lethargy, no weakness. HEENT:  No headache, otalgia, itchy eyes, nasal discharge or sore throat.   Cardiac:  No chest pain, positive shortness of breath, orthopnea or PND. Chest:              No cough, phlegm or wheezing. Abdomen:  No abdominal pain, nausea or vomiting. Neuro:  No focal weakness, abnormal movements orseizure like activity. Skin:   No rashes, no itching. :   No hematuria, no pyuria, no dysuria, no flank pain. Decreased urination. Extremities:  Positive lower extremity swelling. Objective:  CURRENT TEMPERATURE:  Temp: 97.7 °F (36.5 °C)  MAXIMUM TEMPERATURE OVER 24HRS:  Temp (24hrs), Av.6 °F (36.4 °C), Min:97.4 °F (36.3 °C), Max:97.7 °F (36.5 °C)    CURRENT RESPIRATORY RATE:  Resp: 15  CURRENT PULSE:  Pulse: 60  CURRENT BLOOD PRESSURE:  BP: (!) 106/49  24HR BLOOD PRESSURE RANGE:  Systolic (77ZCR), UKL:660 , Min:106 , SCOTT:770   ; Diastolic (45EVQ), CTS:87, Min:49, Max:53    24HR INTAKE/OUTPUT:      Intake/Output Summary (Last 24 hours) at 2020 1039  Last data filed at 2020 8927  Gross per 24 hour   Intake --   Output 5200 ml   Net -5200 ml     Patient Vitals for the past 96 hrs (Last 3 readings):   Weight   20 1945 217 lb 8 oz (98.7 kg)     Physical Exam:  General appearance:Awake, alert, in no acute distress  Skin: warm and dry, no rash or erythema  Eyes: conjunctivae normal and sclera anicteric  ENT: no thrush no pharyngeal congestion    Neck: no carotid bruit ,no  JVD,no carotid Lymphadenopathy, no Thyromegaly   Pulmonary: no wheezing or rhonchi. No rales heard.   Cardiovascular: Normal S1 & S2,  No S3 or  S4, no Pericardial Rub no Murmur   Abdomen: soft nontender, bowel sounds present, no organomegaly,  no ascites  Extremities:no cyanosis, no clubbing, +trace edema    Labs:   CBC:  Recent Labs     20  2106 20  0954   WBC 5.5 5.4   RBC 2.94* 2.96*   HGB 8.8* 8.5*   HCT 28.8* 28.1*   MCV 98.0 94.9   MCH 29.9 28.7   MCHC 30.6 30.2   RDW 13.9 13.9   * 157   MPV 10.4 10.6      BMP:   Recent Labs     20  2106      K 5.2      CO2 18*   *   CREATININE 7.76*   GLUCOSE 123*   CALCIUM 7.5* Albumin:   Recent Labs     08/28/20  2106   LABALBU 3.4*     SPEP:   Lab Results   Component Value Date    PROT 6.1 08/28/2020    PATH PENDING 08/29/2020     UPEP:   Lab Results   Component Value Date     08/29/2020      Urine Sodium:    Lab Results   Component Value Date    ROXANNA 98 08/29/2020      Urine Creatinine:    Lab Results   Component Value Date    LABCREA 37.9 08/29/2020     Urine Eosinophils:   Lab Results   Component Value Date    UREO Eosinophils present 08/29/2020     Urine Protein:    Lab Results   Component Value Date     08/29/2020     Urinalysis:  U/A:   Lab Results   Component Value Date    NITRU POSITIVE 08/29/2020    COLORU RED 08/29/2020    PHUR 7.5 08/29/2020    WBCUA 0 TO 2 08/29/2020    RBCUA TOO NUMEROUS TO COUNT 08/29/2020    MUCUS NOT REPORTED 08/29/2020    TRICHOMONAS NOT REPORTED 08/29/2020    YEAST NOT REPORTED 08/29/2020    BACTERIA NOT REPORTED 08/29/2020    SPECGRAV 1.015 08/29/2020    LEUKOCYTESUR SMALL 08/29/2020    UROBILINOGEN ELEVATED 08/29/2020    BILIRUBINUR NEGATIVE 08/29/2020    GLUCOSEU NEGATIVE 08/29/2020    KETUA TRACE 08/29/2020    AMORPHOUS NOT REPORTED 08/29/2020         Radiology:  Reviewed as available. Assessment:  1. ASHLEY non-oliguric likely due to post renal obstruction as evidenced by CT imaging and U/S of the kidneys. Panchal in place. Urology following. Creatinine was 9 prior to admission at outlying facility now down to 6.    2.  Underlying CKD likely due to hypertension but baseline creatinine currently not available but seems to be around 2 as per last labs in 7/2020.    3.  Urinary retention - Panchal in place  4. Bilateral hydro-ureteronephrosis -Panchal in place  5. Gross hematuria -urology following  6. Elevated PSA -urology following   7. Osseous lesion of L1 and right iliac bone -bone scan likely today. 8.  Anemia-iron deficiency anemia. 9.  Nephrotic range proteinuria. Plan:  1.   Continue IV fluids with sodium bicarbonate 150 mg at 50 cc an hour. 2.  Monitor strict I's and O's and renal function. 3.  Continue bladder irrigation per urology. 4.  Wll try to obtain records, to assess baseline creatinine from patient's cardiologist office Dr. Flynn Franco. 5. Will Check Renal Ultrasound to r/o element of obstruction and to assess the kidney size/echotexture. 6. Comprehensive urine testing including Urinalysis, Urine sodium, potassium, chloride, Urine protein and creatinine to quantify the proteinuria if any at all. 7. Will Order serum and urine protein electrophoresis to r/o element to occult paraprotein disease. 8. Will order Hepatitis B and C, ESTEPHANIA, Complement levels. 9.  Will start Venofer 200 mg daily for 5 days. 10.  Will repeat urinalysis with microscopy and urine protein creatinine ratio due to the nephrotic range proteinuria results and also currently patient has underlying obstructive uropathy. Possibility of renal biopsy was discussed with the patient currently patient and his family does not seem quite keen about the procedure. Probably would not be a good candidate for much of the treatment also if positive considering his advanced age and other comorbidities. Will repeat labs and discuss further. 11. BMP in a.m.  12.  Will follow. Thank you for the consultation. Please do not hesitate to call with questions. Electronically signed by Liv Tirado CNP, BAM - CNP on 8/29/2020 at 10:39 AM    Attending Physician Statement  I have discussed the care of this patient, including pertinent history and exam findings, with the Resident/CNP. I have reviewed and edited the key elements of all parts of the encounter with the Resident/CNP. I agree with the assessment, plan and orders as documented by the Resident/CNP. Jarod Aldana MD   Nephrology [address] [place] [address]

## 2020-08-30 PROBLEM — R97.20 ELEVATED PSA: Status: ACTIVE | Noted: 2020-08-30

## 2020-08-30 LAB
ALBUMIN SERPL-MCNC: 3.5 G/DL (ref 3.5–5.2)
ANION GAP SERPL CALCULATED.3IONS-SCNC: 13 MMOL/L (ref 9–17)
BUN BLDV-MCNC: 68 MG/DL (ref 8–23)
BUN/CREAT BLD: ABNORMAL (ref 9–20)
CALCIUM IONIZED: 0.91 MMOL/L (ref 1.13–1.33)
CALCIUM SERPL-MCNC: 7.4 MG/DL (ref 8.6–10.4)
CHLORIDE BLD-SCNC: 103 MMOL/L (ref 98–107)
CO2: 26 MMOL/L (ref 20–31)
CREAT SERPL-MCNC: 3.96 MG/DL (ref 0.7–1.2)
GFR AFRICAN AMERICAN: 18 ML/MIN
GFR NON-AFRICAN AMERICAN: 14 ML/MIN
GFR SERPL CREATININE-BSD FRML MDRD: ABNORMAL ML/MIN/{1.73_M2}
GFR SERPL CREATININE-BSD FRML MDRD: ABNORMAL ML/MIN/{1.73_M2}
GLUCOSE BLD-MCNC: 113 MG/DL (ref 70–99)
HAV IGM SER IA-ACNC: NONREACTIVE
HCT VFR BLD CALC: 28.2 % (ref 40.7–50.3)
HEMOGLOBIN: 8.5 G/DL (ref 13–17)
HEPATITIS B CORE IGM ANTIBODY: NONREACTIVE
HEPATITIS B SURFACE ANTIGEN: NONREACTIVE
HEPATITIS C ANTIBODY: NONREACTIVE
MAGNESIUM: 2 MG/DL (ref 1.6–2.6)
MCH RBC QN AUTO: 29 PG (ref 25.2–33.5)
MCHC RBC AUTO-ENTMCNC: 30.1 G/DL (ref 28.4–34.8)
MCV RBC AUTO: 96.2 FL (ref 82.6–102.9)
NRBC AUTOMATED: 0 PER 100 WBC
PDW BLD-RTO: 13.7 % (ref 11.8–14.4)
PHOSPHORUS: 4.4 MG/DL (ref 2.5–4.5)
PLATELET # BLD: 136 K/UL (ref 138–453)
PMV BLD AUTO: 9.8 FL (ref 8.1–13.5)
POTASSIUM SERPL-SCNC: 3.6 MMOL/L (ref 3.7–5.3)
PTH INTACT: 145.3 PG/ML (ref 15–65)
RBC # BLD: 2.93 M/UL (ref 4.21–5.77)
SARS-COV-2, PCR: NORMAL
SARS-COV-2, RAPID: NORMAL
SARS-COV-2: NOT DETECTED
SODIUM BLD-SCNC: 142 MMOL/L (ref 135–144)
SOURCE: NORMAL
WBC # BLD: 8.4 K/UL (ref 3.5–11.3)

## 2020-08-30 PROCEDURE — 2580000003 HC RX 258: Performed by: INTERNAL MEDICINE

## 2020-08-30 PROCEDURE — 85027 COMPLETE CBC AUTOMATED: CPT

## 2020-08-30 PROCEDURE — 83970 ASSAY OF PARATHORMONE: CPT

## 2020-08-30 PROCEDURE — 36415 COLL VENOUS BLD VENIPUNCTURE: CPT

## 2020-08-30 PROCEDURE — 82330 ASSAY OF CALCIUM: CPT

## 2020-08-30 PROCEDURE — U0003 INFECTIOUS AGENT DETECTION BY NUCLEIC ACID (DNA OR RNA); SEVERE ACUTE RESPIRATORY SYNDROME CORONAVIRUS 2 (SARS-COV-2) (CORONAVIRUS DISEASE [COVID-19]), AMPLIFIED PROBE TECHNIQUE, MAKING USE OF HIGH THROUGHPUT TECHNOLOGIES AS DESCRIBED BY CMS-2020-01-R: HCPCS

## 2020-08-30 PROCEDURE — 51702 INSERT TEMP BLADDER CATH: CPT

## 2020-08-30 PROCEDURE — 80069 RENAL FUNCTION PANEL: CPT

## 2020-08-30 PROCEDURE — 6370000000 HC RX 637 (ALT 250 FOR IP): Performed by: NURSE PRACTITIONER

## 2020-08-30 PROCEDURE — 86038 ANTINUCLEAR ANTIBODIES: CPT

## 2020-08-30 PROCEDURE — 99233 SBSQ HOSP IP/OBS HIGH 50: CPT | Performed by: INTERNAL MEDICINE

## 2020-08-30 PROCEDURE — 99232 SBSQ HOSP IP/OBS MODERATE 35: CPT | Performed by: INTERNAL MEDICINE

## 2020-08-30 PROCEDURE — 6370000000 HC RX 637 (ALT 250 FOR IP): Performed by: INTERNAL MEDICINE

## 2020-08-30 PROCEDURE — 6360000002 HC RX W HCPCS: Performed by: STUDENT IN AN ORGANIZED HEALTH CARE EDUCATION/TRAINING PROGRAM

## 2020-08-30 PROCEDURE — 51700 IRRIGATION OF BLADDER: CPT

## 2020-08-30 PROCEDURE — 2580000003 HC RX 258: Performed by: STUDENT IN AN ORGANIZED HEALTH CARE EDUCATION/TRAINING PROGRAM

## 2020-08-30 PROCEDURE — 83735 ASSAY OF MAGNESIUM: CPT

## 2020-08-30 PROCEDURE — 87086 URINE CULTURE/COLONY COUNT: CPT

## 2020-08-30 PROCEDURE — 6360000002 HC RX W HCPCS: Performed by: INTERNAL MEDICINE

## 2020-08-30 PROCEDURE — 80074 ACUTE HEPATITIS PANEL: CPT

## 2020-08-30 PROCEDURE — 99222 1ST HOSP IP/OBS MODERATE 55: CPT | Performed by: INTERNAL MEDICINE

## 2020-08-30 PROCEDURE — 1200000000 HC SEMI PRIVATE

## 2020-08-30 RX ORDER — HYOSCYAMINE SULFATE 0.125 MG
125 TABLET ORAL EVERY 6 HOURS PRN
Status: DISCONTINUED | OUTPATIENT
Start: 2020-08-30 | End: 2020-08-30 | Stop reason: CLARIF

## 2020-08-30 RX ORDER — SODIUM CHLORIDE 450 MG/100ML
INJECTION, SOLUTION INTRAVENOUS CONTINUOUS
Status: DISCONTINUED | OUTPATIENT
Start: 2020-08-30 | End: 2020-09-02 | Stop reason: HOSPADM

## 2020-08-30 RX ADMIN — CEFTRIAXONE SODIUM 1 G: 1 INJECTION, POWDER, FOR SOLUTION INTRAMUSCULAR; INTRAVENOUS at 17:59

## 2020-08-30 RX ADMIN — POLYETHYLENE GLYCOL 3350 17 G: 17 POWDER, FOR SOLUTION ORAL at 13:34

## 2020-08-30 RX ADMIN — ACETAMINOPHEN 650 MG: 325 TABLET ORAL at 16:55

## 2020-08-30 RX ADMIN — TAMSULOSIN HYDROCHLORIDE 0.4 MG: 0.4 CAPSULE ORAL at 09:10

## 2020-08-30 RX ADMIN — MIDODRINE HYDROCHLORIDE 5 MG: 5 TABLET ORAL at 21:15

## 2020-08-30 RX ADMIN — SODIUM CHLORIDE: 4.5 INJECTION, SOLUTION INTRAVENOUS at 09:10

## 2020-08-30 RX ADMIN — ACETAMINOPHEN 650 MG: 325 TABLET ORAL at 00:08

## 2020-08-30 RX ADMIN — IRON SUCROSE 200 MG: 20 INJECTION, SOLUTION INTRAVENOUS at 05:09

## 2020-08-30 ASSESSMENT — PAIN SCALES - GENERAL
PAINLEVEL_OUTOF10: 3
PAINLEVEL_OUTOF10: 3

## 2020-08-30 NOTE — PLAN OF CARE
Problem: Urinary Elimination:  Goal: Signs and symptoms of infection will decrease  Description: Signs and symptoms of infection will decrease  Outcome: Ongoing  Goal: Complications related to the disease process, condition or treatment will be avoided or minimized  Description: Complications related to the disease process, condition or treatment will be avoided or minimized  Outcome: Ongoing

## 2020-08-30 NOTE — PROGRESS NOTES
Renal Progress Note    Patient:  Rob Walker; 80 y.o. MRN# [id number]  Location:  [facility]/1222-38  Attending:  Consuelo Granado MD  Admit Date:  2020   Hospital Day: 2    Subjective   Patient was seen and examined. No acute events overnight. Vital signs stable   Continues on continuous bladder irrigation. Tolerating Panchal catheter no hand irrigations overnight. Overall gross hematuria improving. Reviewed urology note. 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 Heme-onc consulted. Urine output over the last 24 hours 1.1 L. Labs reviewed. Creatinine proved from 6->3.9 today. BUN is 68 was 94 yesterday. Potassium 3.6. Sodium is normal 142. Ionized calcium 0.91. Hemoglobin 8.5. Platelets 226.     Objective   VS: BP (!) 124/49   Pulse 85   Temp 98.3 °F (36.8 °C) (Oral)   Resp 20   Ht 5' 7\" (1.702 m)   Wt 220 lb (99.8 kg)   SpO2 94%   BMI 34.46 kg/m²   MAXIMUM TEMPERATURE OVER 24 HRS:  Temp (24hrs), Av.4 °F (36.9 °C), Min:98.2 °F (36.8 °C), Max:98.5 °F (36.9 °C)    24 HR BLOOD PRESSURE RANGE:  Systolic (83CZF), VNV:825 , Min:98 , JFK:700   ; Diastolic (01OKJ), WZD:52, Min:36, Max:67    24 HR INTAKE/OUTPUT:      Intake/Output Summary (Last 24 hours) at 2020 1231  Last data filed at 2020 0626  Gross per 24 hour   Intake --   Output 3150 ml   Net -3150 ml     WEIGHT:   Patient Vitals for the past 96 hrs (Last 3 readings):   Weight   20 0634 220 lb (99.8 kg)   20 1945 217 lb 8 oz (98.7 kg)       Current Medications    Scheduled Meds:    iron sucrose  200 mg Intravenous Q24H    [Held by provider] carvedilol  25 mg Oral BID    tamsulosin  0.4 mg Oral Daily    [Held by provider] aspirin  81 mg Oral Daily    sodium chloride  1,000 mL Intravenous Once    midodrine  5 mg Oral TID WC    sodium chloride flush  10 mL Intravenous 2 times per day     Continuous Infusions:    sodium chloride 50 mL/hr at 08/30/20 0910    sodium chloride         Physical Examination     General appearance:Awake, alert, in no acute distress  Skin: warm and dry, no rash or erythema  Eyes: conjunctivae normal and sclera anicteric  ENT: no thrush no pharyngeal congestion    Neck: supple  Pulmonary: no wheezing or rhonchi. No rales heard.   Cardiovascular: Normal S1 & S2,  No S3 or  S4, no Pericardial Rub no Murmur    Abdomen: soft nontender, bowel sounds present, no organomegaly,  no ascites  Extremities:no cyanosis, no clubbing, +trace edema    Labs      Recent Labs     08/28/20 2106 08/29/20  0954 08/30/20  0846   WBC 5.5 5.4 8.4   RBC 2.94* 2.96* 2.93*   HGB 8.8* 8.5* 8.5*   HCT 28.8* 28.1* 28.2*   MCV 98.0 94.9 96.2   MCH 29.9 28.7 29.0   MCHC 30.6 30.2 30.1   RDW 13.9 13.9 13.7   * 157 136*   MPV 10.4 10.6 9.8      BMP:   Recent Labs     08/29/20  0954 08/29/20  1238 08/30/20  0629    139 142   K 4.3 4.5 3.6*    104 103   CO2 21 16* 26   BUN 92* 94* 68*   CREATININE 6.01* 6.04* 3.96*   GLUCOSE 100* 158* 113*   CALCIUM 7.5* 7.6* 7.4*      Phosphorus:     Recent Labs     08/29/20  0954 08/30/20  0629   PHOS 5.9* 4.4     Magnesium:    Recent Labs     08/29/20  0954 08/30/20  0629   MG 2.3 2.0     Albumin:    Recent Labs     08/28/20 2106 08/29/20  0954 08/30/20  0629   LABALBU 3.4* 3.5  3.5 3.5     PTH:     Lab Results   Component Value Date    IPTH 145.3 08/30/2020     Urinalysis/Chemistries      Lab Results   Component Value Date    NITRU POSITIVE 08/29/2020    COLORU RED 08/29/2020    PHUR 7.5 08/29/2020    WBCUA 0 TO 2 08/29/2020    RBCUA TOO NUMEROUS TO COUNT 08/29/2020    MUCUS NOT REPORTED 08/29/2020    TRICHOMONAS NOT REPORTED 08/29/2020    YEAST NOT REPORTED 08/29/2020    BACTERIA NOT REPORTED 08/29/2020    SPECGRAV 1.015 08/29/2020    LEUKOCYTESUR SMALL 08/29/2020    UROBILINOGEN ELEVATED 08/29/2020    BILIRUBINUR NEGATIVE 08/29/2020    GLUCOSEU NEGATIVE 08/29/2020    KETUA TRACE 08/29/2020    AMORPHOUS NOT REPORTED 08/29/2020       Radiology    Reviewed as available    Assessment    1. ASHLEY non-oliguric likely due to post renal obstruction as evidenced by CT imaging and U/S of the kidneys. Panchal in place. Urology following. Creatinine was 9 prior to admission at outlying facility now down to 3.96.    2.  Underlying CKD likely due to hypertension but baseline creatinine currently not available but seems to be around 2 as per last labs in 7/2020.    3.  Urinary retention - Panchal in place  4. Bilateral hydro-ureteronephrosis -Panchal in place  5. Gross hematuria -urology following  6. Elevated PSA -urology following   7. 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 Heme-onc consulted. 8. Anemia- iron deficiency anemia. Continues on Venofer. 9.  Nephrotic range proteinuria. Plan   1. Agree with changing fluids to normal saline 50 cc an hour. 2.  Monitor strict I's and O's and renal function. 3.  Continue bladder irrigation per urology. 4.  Wll try to obtain records, to assess baseline creatinine from patient's cardiologist office Dr. Cindy Ruelas. 5.  Continue Venofer 200 mg daily for 5 days. 6.  Avoid nephrotoxic agents including IV contrast and NSAIDs if all possible. 7.  Replace potassium and recheck levels. 8.  Follow-up heme-onc plan. 9.  Labs in a.m.  10.  Will follow. Patient's nurse was updated. Nutrition   Renal Diet/TF      Thank you. Please call with any questions. Electronically signed by Talat Jade CNP, BAM - CNP on 8/30/2020 at 12:31 PM   [facility]. Attending Physician Statement  I have discussed the care of this patient, including pertinent history and exam findings, with the Resident/CNP. I have reviewed and edited the key elements of all parts of the encounter with the Resident/CNP. I agree with the assessment, plan and orders as documented by the Resident/CNP.     Jarod Carlene Quintero MD   Nephrology [address] [place] [address] Drive    This note is created with the assistance of a speech-recognition program. While intending to generate a document that actually reflects the content of the visit, no guarantees can be provided that every mistake has been identified and corrected by editing.

## 2020-08-30 NOTE — PROGRESS NOTES
[unfilled]    [facility] [address]    Progress Note    8/30/2020    4:06 PM    Name:   Vanesa Ortiz  MRN:     [id number]     Acct:      [de-identified]   Room:   [address] [place] [address]  IP Day:  2  Admit Date:  8/28/2020  7:53 PM    PCP:   BAM Eduardo CNP  Code Status:  Full Code    Subjective:     C/C: No chief complaint on file. Interval History Status: improved. Patient seen and examined in the presence of her daughter and wife. Also reviewed urology consult input. Patient remains on CBI. 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 Patient denies any pain, fever or chills. Brief History:     Vanesa Ortiz is a 80 y.o. Non-/non  male who presents with No chief complaint on file. and is admitted to the hospital for the management of acute renal failure superimposed on chronic kidney disease stage II with Obstructive uropathy with hydroureteronephrosis, and exacerbation of systolic heart failure. Patient was sent to [place] [address] as a direct admit from [facility].  Patient presented to [facility] for the complaint of increased shortness of breath and swelling of the lower extremities after being called by his provider stating he needed to be seen in the emergency room for the elevated BUN and creatinine on repeat labs after increasing Lasix and potassium secondary to fluid overload acute exacerbation of heart failure in the outpatient setting . Patient states the swelling of the lower extremities  has been ongoing for approximately the past month and was recently referred and seen by cardiologist Dr. Kaitlyn Cedillo who increased his Lasix to 40 mg and potassium to 20 mEq.   However patient did not have any significant improvement in the swelling worsened, he apparently had a fall 2 weeks ago in the grass which he does not describe as syncopal which he states is \"his legs just got weak\", after discussion with his family and encouragement he then decided to seek treatment in the emergency room 2 days after the fall. He currently denies any chest pain, dizziness lightheadedness syncopal or presyncopal symptomology, nausea vomiting diarrhea. He denies any dysuria urinary frequency urgency incomplete emptying or changes in his bowel or bladder, as he states that he has obstipation in which he takes Linzess which then causes diarrhea. Review of Systems:     Constitutional:  negative for chills, fevers, sweats  Respiratory:  negative for cough, dyspnea on exertion, shortness of breath, wheezing  Cardiovascular:  negative for chest pain, chest pressure/discomfort, lower extremity edema, palpitations  Gastrointestinal:  negative for abdominal pain, constipation, diarrhea, nausea, vomiting  Neurological:  negative for dizziness, headache    Medications:      Allergies:  No Known Allergies    Current Meds:   Scheduled Meds:    iron sucrose  200 mg Intravenous Q24H    [Held by provider] carvedilol  25 mg Oral BID    tamsulosin  0.4 mg Oral Daily    [Held by provider] aspirin  81 mg Oral Daily    sodium chloride  1,000 mL Intravenous Once    midodrine  5 mg Oral TID WC    sodium chloride flush  10 mL Intravenous 2 times per day     Continuous Infusions:    sodium chloride 50 mL/hr at 08/30/20 0910    sodium chloride       PRN Meds: hyoscyamine, lidocaine, morphine, sodium chloride flush, acetaminophen **OR** acetaminophen, polyethylene glycol, promethazine **OR** ondansetron, nicotine    Data:     Past Medical History:   has a past medical history of AAA (abdominal aortic aneurysm) ([facility] [address] BPH (benign prostatic hyperplasia), CAD (coronary artery disease), GIB (gastrointestinal bleeding), Hyperlipidemia, Skin cancer, basal cell, Stroke (cerebrum) ([address] and Systolic heart failure ([facility] [address] Social History:   reports that he quit smoking about 54 years ago. His smoking use included cigarettes. He quit after 15.00 years of use. He has never used smokeless tobacco. He reports current alcohol use. He reports that he does not use drugs. Family History:   Family History   Problem Relation Age of Onset    Heart Disease Mother     Hypertension Mother     Heart Disease Father     Heart Disease Brother        Vitals:  BP (!) 125/48   Pulse 91   Temp 100.6 °F (38.1 °C) (Oral)   Resp 24   Ht 5' 7\" (1.702 m)   Wt 220 lb (99.8 kg)   SpO2 93%   BMI 34.46 kg/m²   Temp (24hrs), Av.9 °F (37.2 °C), Min:98.2 °F (36.8 °C), Max:100.6 °F (38.1 °C)    No results for input(s): POCGLU in the last 72 hours. I/O (24Hr):     Intake/Output Summary (Last 24 hours) at 2020 1606  Last data filed at 2020 0626  Gross per 24 hour   Intake --   Output 4000 ml   Net -4000 ml       Labs:  Hematology:  Recent Labs     20  0954 20  0846   WBC 5.5 5.4 8.4   RBC 2.94* 2.96* 2.93*   HGB 8.8* 8.5* 8.5*   HCT 28.8* 28.1* 28.2*   MCV 98.0 94.9 96.2   MCH 29.9 28.7 29.0   MCHC 30.6 30.2 30.1   RDW 13.9 13.9 13.7   * 157 136*   MPV 10.4 10.6 9.8     Chemistry:  Recent Labs     20  21020  0105 20  0607 20  0954 20  1238 20  0629 20  0846     --   --  143 139 142  --    K 5.2  --   --  4.3 4.5 3.6*  --      --   --  104 104 103  --    CO2 18*  --   --  21 16* 26  --    GLUCOSE 123*  --   --  100* 158* 113*  --    *  --   --  92* 94* 68*  --    CREATININE 7.76*  --   --  6.01* 6.04* 3.96*  --    MG  --   --   --  2.3  --  2.0  --    ANIONGAP 19*  --   --  18* 19* 13  --    LABGLOM 7*  --   --  9* 9* 14*  --    GFRAA 8*  --   --  11* 11* 18*  --    CALCIUM 7.5*  --   --  7.5* 7.6* 7.4*  --    CAION  --   --   --   --   --   --  0.91*   PHOS  --   --   --  5.9*  --  4.4  --    PSA 97.66*  --   -- --   --   --   --    PROBNP 12,952*  --   --  10,832*  --   --   --    TROPHS 103* 105* 105*  --  106*  --   --    CKTOTAL  --   --   --  99  --   --   --    MYOGLOBIN 127* 124* 113*  --  140*  --   --      Recent Labs     08/28/20  2106 08/29/20  0954 08/29/20  1238 08/30/20  0629   PROT 6.1* 6.0* 5.4*  --    LABALBU 3.4* 3.5  3.5  --  3.5   AST 12 11  --   --    ALT 11 10  --   --    ALKPHOS 93 93  --   --    BILITOT 0.44 0.56  --   --    BILIDIR 0.24 0.27  --   --      ABG:No results found for: POCPH, PHART, PH, POCPCO2, YZX9WJR, PCO2, POCPO2, PO2ART, PO2, POCHCO3, DJN4AYW, HCO3, NBEA, PBEA, BEART, BE, THGBART, THB, JBA1TQQ, MIHC6AYN, F7KYFBMX, O2SAT, FIO2  No results found for: SPECIAL  No results found for: CULTURE    Radiology:  Nm Bone Scan Whole Body    Result Date: 8/29/2020  Findings consistent with 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 Question small lesions within the ribs. Us Retroperitoneal Limited    Result Date: 8/28/2020  1. Evaluation is limited due to patient body habitus as well as bowel gas and rib shadowing. 2. There appears to be moderate right and mild left-sided hydronephrosis. 3. Otherwise, no gross sonographic abnormality seen of the kidneys.        Physical Examination:        General appearance:  alert, cooperative and no distress  Mental Status:  oriented to person, place and time and normal affect  Lungs:  clear to auscultation bilaterally, normal effort  Heart:  regular rate and rhythm, no murmur  Abdomen:  soft, nontender, nondistended, normal bowel sounds, no masses, hepatomegaly, splenomegaly  Extremities:  no edema, redness, tenderness in the calves  Skin:  no gross lesions, rashes, induration    Assessment:        Hospital Problems           Last Modified POA    * (Principal) ASHLEY (acute kidney injury) ([facility] [address] 8/30/2020 Yes    Hydroureteronephrosis 8/28/2020 Yes    Acute on chronic systolic heart failure ([facility] [address] 8/28/2020 Yes    Coronary artery disease involving coronary bypass graft of native heart without angina pectoris 8/28/2020 Yes    Benign prostatic hyperplasia with urinary obstruction 8/28/2020 Yes    Abdominal aortic aneurysm (AAA) without rupture ([address] 8/29/2020 Yes    Gross hematuria 8/29/2020 Yes    CKD (chronic kidney disease) stage 3, GFR 30-59 ml/min (HCC) (Chronic) 8/29/2020 Yes    Urinary obstruction 8/29/2020 Yes    Prostate cancer, primary, 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 Continue with CBI and patient will be followed by urology for the hematuria   2. Patient has markedly elevated PSA with lytic lesions in the spine, sacrum and the ribs; likely due to metastatic prostatic CA, especially with the Mandaen of the lower urinary tract obstruction. Oncology consult has been placed for evaluation and recommendations. 3. Nephrology consult had also been called into assess the patient for their input with reference to his acute kidney injury  4. Patient was started on bicarb drip for the acute kidney injury as well as the metabolic acidosis, changed to .45% NS.  5. Patient had developed hypotension and therefore the Coreg is kept on hold and midodrine p.o. has been added to his regimen. He has also been scheduled for normal saline 1 L bolus  6. Appreciate input by all consultants, continue with current regimen, maintain in-house overnight with a.m. labs. Plan details discussed with patient, wife and daughter and all concerns and, questions were addressed.     Alia Meredith MD  8/30/2020  4:06 PM

## 2020-08-30 NOTE — PROGRESS NOTES
pelvis    Impression:      Ana Luisa Lim is a 80 y.o. male admitted with      Problem List  - Urinary Retention, Panchal for 3L  - Bilateral hydroureteronephrosis  - Post obstructive diuresis, physiologic  - Gross hematuria  - Elevated PSA, 97  - Osseus lesions on spine L1 and R Iliac bone  - Anemia, unclear etiology    Plan:     - AUR: Maintain Panchal catheter until Cr nadirs and post-obstructive diuresis resolves  - Gross hematuria improving; Wean CBI as able  - Drink to thirst given physiologic POD and CHF  - Monitor Hb. Aspirin on hold  - Monitor Cr and UOP. Special attention to K, Mg, and P, ASHLEY improving  - Elevated PSA worrisome for metastatic prostate cancer given concerning osseus lesions noted on Bone scan; recommend oncology consult    Kaity Holland  Urology Resident, PGY5  7:36 AM 8/30/2020     I have reviewed the history above and agree. I have repeated the key portions of the physical exam and concur with the resident's findings. I have reviewed all laboratory findings and imaging reports/films. I agree with the plan as noted above.     Electronically signed by Agustina Lawson MD on 8/30/2020 at 8:10 AM

## 2020-08-30 NOTE — PLAN OF CARE
Problem: Falls - Risk of:  Goal: Will remain free from falls  Description: Will remain free from falls  Outcome: Ongoing  Goal: Absence of physical injury  Description: Absence of physical injury  Outcome: Ongoing     Problem: Fluid Volume:  Goal: Hemodynamic stability will improve  Description: Hemodynamic stability will improve  Outcome: Ongoing  Goal: Ability to maintain a balanced intake and output will improve  Description: Ability to maintain a balanced intake and output will improve  Outcome: Ongoing     Problem: Health Behavior:  Goal: Identification of resources available to assist in meeting health care needs will improve  Description: Identification of resources available to assist in meeting health care needs will improve  Outcome: Ongoing     Problem: Respiratory:  Goal: Respiratory status will improve  Description: Respiratory status will improve  Outcome: Ongoing     Problem: OXYGENATION/RESPIRATORY FUNCTION  Goal: Patient will maintain patent airway  Outcome: Ongoing  Goal: Patient will achieve/maintain normal respiratory rate/effort  Description: Respiratory rate and effort will be within normal limits for the patient  Outcome: Ongoing     Problem: HEMODYNAMIC STATUS  Goal: Patient has stable vital signs and fluid balance  Outcome: Ongoing     Problem: FLUID AND ELECTROLYTE IMBALANCE  Goal: Fluid and electrolyte balance are achieved/maintained  Outcome: Ongoing     Problem: ACTIVITY INTOLERANCE/IMPAIRED MOBILITY  Goal: Mobility/activity is maintained at optimum level for patient  Outcome: Ongoing     Problem: Skin Integrity:  Goal: Will show no infection signs and symptoms  Description: Will show no infection signs and symptoms  Outcome: Ongoing  Goal: Absence of new skin breakdown  Description: Absence of new skin breakdown  Outcome: Ongoing     Problem: Urinary Elimination:  Goal: Signs and symptoms of infection will decrease  Description: Signs and symptoms of infection will decrease  Outcome: Ongoing  Goal: Complications related to the disease process, condition or treatment will be avoided or minimized  Description: Complications related to the disease process, condition or treatment will be avoided or minimized  Outcome: Ongoing

## 2020-08-30 NOTE — PROGRESS NOTES
Daughter called and states pt is seen by cardiologist Dr. David Almeida in [facility].

## 2020-08-30 NOTE — CONSULTS
Today's Date: 8/30/2020  Patient Name: Lindsay Souza  Date of admission: 8/28/2020  7:53 PM  Patient's age: 80 y.o., 5/21/1933  Admission Dx: Acute renal failure ([facility] [address] [N17.9]    Reason for Consult: metastatic prostate CA to the spine and ribs   Requesting Physician: Ori Platt MD    CHIEF COMPLAINT: No chief complaint on file. Shortness of breath    History Obtained From: Patient and chart  HISTORY OF PRESENT ILLNESS:      This is a 55-year-old male who was admitted from [facility] for complaints of worsening shortness of breath and swelling in lower extremities. Patient has history of CHF and been following with cardiologist.  Patient was also noted to have acute kidney injury on top of chronic kidney disease and being managed for acute exacerbation of systolic heart failure. Patient was noted to have high PSA of 97.6 and also sclerotic lesion in L1 vertebral body and right iliac bone suspicious for metastatic disease. Oncology consult for further opinion. Past Medical History:   has a past medical history of AAA (abdominal aortic aneurysm) ([address] BPH (benign prostatic hyperplasia), CAD (coronary artery disease), GIB (gastrointestinal bleeding), Hyperlipidemia, Skin cancer, basal cell, Stroke (cerebrum) ([address] and Systolic heart failure ([address] Past Surgical History:   has a past surgical history that includes Coronary artery bypass graft; Vocal Cord Surgery; Skin cancer excision; and Eye surgery. Medications:    Prior to Admission medications    Medication Sig Start Date End Date Taking?  Authorizing Provider   furosemide (LASIX) 40 MG tablet Take 40 mg by mouth 2 times daily 8/11/20  Yes Historical Provider, MD   tamsulosin (FLOMAX) 0.4 MG capsule Take 0.4 mg by mouth daily   Yes Historical Provider, MD   carvedilol (COREG) 25 MG tablet Take 25 mg by mouth 2 times daily   Yes Historical Provider, MD   sacubitril-valsartan (ENTRESTO) 24-26 MG per tablet Take 1 tablet by mouth 2 times daily   Yes Historical Provider, MD   sacubitril-valsartan (ENTRESTO)  MG per tablet Take 1 tablet by mouth 2 times daily   Yes Historical Provider, MD   linaclotide (LINZESS) 145 MCG capsule Take 145 mcg by mouth every morning (before breakfast)   Yes Historical Provider, MD   rosuvastatin (CRESTOR) 10 MG tablet Take 10 mg by mouth daily   Yes Historical Provider, MD   aspirin 81 MG EC tablet Take 81 mg by mouth daily   Yes Historical Provider, MD   potassium chloride (KLOR-CON) 10 MEQ extended release tablet Take 20 mEq by mouth daily 8/11/20   Historical Provider, MD   aspirin 81 MG chewable tablet Baby Aspirin 81 mg chewable tablet   Chew 1 tablet every day by oral route.     Historical Provider, MD   linaclotide Rusty Adalgisa) 145 MCG capsule Linzess 145 mcg capsule    Historical Provider, MD     Current Facility-Administered Medications   Medication Dose Route Frequency Provider Last Rate Last Dose    iron sucrose (VENOFER) 200 mg in sodium chloride 0.9 % 100 mL IVPB  200 mg Intravenous Q24H Ila Parra MD   Stopped at 08/30/20 0617    0.45 % sodium chloride infusion   Intravenous Continuous Wale Headley MD 50 mL/hr at 08/30/20 0910      hyoscyamine (LEVSIN/SL) sublingual tablet 125 mcg  125 mcg Sublingual Q6H PRN Karlene Parsons MD        [Held by provider] carvedilol (COREG) tablet 25 mg  25 mg Oral BID BAM Helms - ESSENCE        tamsulosin (FLOMAX) capsule 0.4 mg  0.4 mg Oral Daily BAM Helms CNP   0.4 mg at 08/30/20 0910    [Held by provider] aspirin EC tablet 81 mg  81 mg Oral Daily BAM Anderson CNP        lidocaine (XYLOCAINE) 2 % uro-jet   Topical PRN Karlene Parsons MD        sodium chloride 0.9 % irrigation 3,000 mL  3,000 mL Irrigation Continuous Karlene Parsons MD   3,000 mL at 08/29/20 1436    morphine (PF) injection 2 mg  2 mg Intravenous Q4H PRN Wale Headley MD        0.9 % sodium chloride bolus  1,000 mL Intravenous Once Wale Headley MD   Stopped at 08/29/20 2046    midodrine (PROAMATINE) tablet 5 mg  5 mg Oral TID  Shira Diaz MD        sodium chloride flush 0.9 % injection 10 mL  10 mL Intravenous 2 times per day Macy Pilon, APRN - NP   10 mL at 08/29/20 0846    sodium chloride flush 0.9 % injection 10 mL  10 mL Intravenous PRN Macy Pilon, APRN - NP        acetaminophen (TYLENOL) tablet 650 mg  650 mg Oral Q6H PRN Macy Pilon, APRN - NP   650 mg at 08/30/20 0008    Or    acetaminophen (TYLENOL) suppository 650 mg  650 mg Rectal Q6H PRN Macy Pilon, APRN - NP        polyethylene glycol (GLYCOLAX) packet 17 g  17 g Oral Daily PRN Macy Pilon, APRN - NP   17 g at 08/30/20 1334    promethazine (PHENERGAN) tablet 12.5 mg  12.5 mg Oral Q6H PRN Macy Pilon, APRN - NP        Or    [facility]ansetron [facility]) injection 4 mg  4 mg Intravenous Q6H PRN Macy Pilon, APRN - NP        nicotine (NICODERM CQ) 21 MG/24HR 1 patch  1 patch Transdermal Daily PRN Macy Pilon, APRN - NP           Allergies:  Patient has no known allergies. Social History:   reports that he quit smoking about 54 years ago. His smoking use included cigarettes. He quit after 15.00 years of use. He has never used smokeless tobacco. He reports current alcohol use. He reports that he does not use drugs. Family History: family history includes Heart Disease in his brother, father, and mother; Hypertension in his mother. REVIEW OF SYSTEMS:    Constitutional: No fever or chills.  No night sweats, no weight loss   Eyes: No eye discharge, double vision, or eye pain   HEENT: negative for sore mouth, sore throat, hoarseness and voice change   Respiratory: negative for cough , sputum, dyspnea, wheezing, hemoptysis, chest pain   Cardiovascular: negative for chest pain, dyspnea, palpitations, orthopnea, PND   Gastrointestinal: negative for nausea, vomiting, diarrhea, constipation, abdominal pain, Dysphagia, hematemesis and hematochezia   Genitourinary: negative for consistent with metastatic disease in the spine and posterior pelvis  3. I had discussion with patient and his daughter Beverly Luna over the telephone. I explained that prostate cancer is slow-growing and even with metastatic disease can be controlled with androgen depression therapy which is much tolerable  4. Will need tissue diagnosis and will need bone biopsy. 5. Patient and family agreeable  6. We will follow      Discussed with patient and Nurse. Thank you for asking us to see this patient. Mykel Byrd MD  Hematologist/Medical Oncologist    Cell: [phone]      This note is created with the assistance of a speech recognition program.  While intending to generate a document that actually reflects the content of the visit, the document can still have some errors including those of syntax and sound a like substitutions which may escape proof reading. It such instances, actual meaning can be extrapolated by contextual diversion.

## 2020-08-31 ENCOUNTER — APPOINTMENT (OUTPATIENT)
Dept: CT IMAGING | Age: 85
DRG: 682 | End: 2020-08-31
Attending: INTERNAL MEDICINE
Payer: MEDICARE

## 2020-08-31 ENCOUNTER — ANESTHESIA EVENT (OUTPATIENT)
Dept: OPERATING ROOM | Age: 85
DRG: 682 | End: 2020-08-31
Payer: MEDICARE

## 2020-08-31 ENCOUNTER — ANESTHESIA (OUTPATIENT)
Dept: OPERATING ROOM | Age: 85
DRG: 682 | End: 2020-08-31
Payer: MEDICARE

## 2020-08-31 ENCOUNTER — APPOINTMENT (OUTPATIENT)
Dept: ULTRASOUND IMAGING | Age: 85
DRG: 682 | End: 2020-08-31
Attending: INTERNAL MEDICINE
Payer: MEDICARE

## 2020-08-31 VITALS — DIASTOLIC BLOOD PRESSURE: 29 MMHG | SYSTOLIC BLOOD PRESSURE: 83 MMHG | OXYGEN SATURATION: 100 %

## 2020-08-31 LAB
ABSOLUTE EOS #: 0 K/UL (ref 0–0.44)
ABSOLUTE IMMATURE GRANULOCYTE: 0 K/UL (ref 0–0.3)
ABSOLUTE LYMPH #: 0.35 K/UL (ref 1.1–3.7)
ABSOLUTE MONO #: 1.04 K/UL (ref 0.1–1.2)
ALBUMIN (CALCULATED): 3.2 G/DL (ref 3.2–5.2)
ALBUMIN PERCENT: 59 % (ref 45–65)
ALBUMIN SERPL-MCNC: 3.1 G/DL (ref 3.5–5.2)
ALBUMIN SERPL-MCNC: 3.3 G/DL (ref 3.5–5.2)
ALPHA 1 PERCENT: 3 % (ref 3–6)
ALPHA 2 PERCENT: 13 % (ref 6–13)
ALPHA-1-GLOBULIN: 0.2 G/DL (ref 0.1–0.4)
ALPHA-2-GLOBULIN: 0.7 G/DL (ref 0.5–0.9)
ANION GAP SERPL CALCULATED.3IONS-SCNC: 13 MMOL/L (ref 9–17)
ANION GAP SERPL CALCULATED.3IONS-SCNC: 14 MMOL/L (ref 9–17)
ANTI-NUCLEAR ANTIBODY (ANA): NEGATIVE
BASOPHILS # BLD: 0 % (ref 0–2)
BASOPHILS ABSOLUTE: 0 K/UL (ref 0–0.2)
BETA GLOBULIN: 0.6 G/DL (ref 0.5–1.1)
BETA PERCENT: 11 % (ref 11–19)
BNP INTERPRETATION: ABNORMAL
BUN BLDV-MCNC: 40 MG/DL (ref 8–23)
BUN BLDV-MCNC: 40 MG/DL (ref 8–23)
BUN/CREAT BLD: ABNORMAL (ref 9–20)
BUN/CREAT BLD: ABNORMAL (ref 9–20)
CALCIUM SERPL-MCNC: 7 MG/DL (ref 8.6–10.4)
CALCIUM SERPL-MCNC: 7 MG/DL (ref 8.6–10.4)
CHLORIDE BLD-SCNC: 101 MMOL/L (ref 98–107)
CHLORIDE BLD-SCNC: 103 MMOL/L (ref 98–107)
CO2: 25 MMOL/L (ref 20–31)
CO2: 25 MMOL/L (ref 20–31)
CREAT SERPL-MCNC: 2.3 MG/DL (ref 0.7–1.2)
CREAT SERPL-MCNC: 2.44 MG/DL (ref 0.7–1.2)
CULTURE: NORMAL
DIFFERENTIAL TYPE: ABNORMAL
EKG ATRIAL RATE: 63 BPM
EKG Q-T INTERVAL: 440 MS
EKG QRS DURATION: 128 MS
EKG QTC CALCULATION (BAZETT): 450 MS
EKG R AXIS: -38 DEGREES
EKG T AXIS: 161 DEGREES
EKG VENTRICULAR RATE: 63 BPM
EOSINOPHILS RELATIVE PERCENT: 0 % (ref 1–4)
GAMMA GLOBULIN %: 14 % (ref 9–20)
GAMMA GLOBULIN: 0.8 G/DL (ref 0.5–1.5)
GFR AFRICAN AMERICAN: 31 ML/MIN
GFR AFRICAN AMERICAN: 33 ML/MIN
GFR NON-AFRICAN AMERICAN: 25 ML/MIN
GFR NON-AFRICAN AMERICAN: 27 ML/MIN
GFR SERPL CREATININE-BSD FRML MDRD: ABNORMAL ML/MIN/{1.73_M2}
GLUCOSE BLD-MCNC: 107 MG/DL (ref 70–99)
GLUCOSE BLD-MCNC: 109 MG/DL (ref 70–99)
HCT VFR BLD CALC: 27.1 % (ref 40.7–50.3)
HEMOGLOBIN: 8.2 G/DL (ref 13–17)
IMMATURE GRANULOCYTES: 0 %
LYMPHOCYTES # BLD: 3 % (ref 24–43)
Lab: NORMAL
MAGNESIUM: 1.8 MG/DL (ref 1.6–2.6)
MCH RBC QN AUTO: 29 PG (ref 25.2–33.5)
MCHC RBC AUTO-ENTMCNC: 30.3 G/DL (ref 28.4–34.8)
MCV RBC AUTO: 95.8 FL (ref 82.6–102.9)
MONOCYTES # BLD: 9 % (ref 3–12)
MORPHOLOGY: NORMAL
NRBC AUTOMATED: 0 PER 100 WBC
P E INTERPRETATION, U: NORMAL
PATHOLOGIST: ABNORMAL
PATHOLOGIST: NORMAL
PATHOLOGIST: NORMAL
PDW BLD-RTO: 13.6 % (ref 11.8–14.4)
PHOSPHORUS: 3.1 MG/DL (ref 2.5–4.5)
PHOSPHORUS: 3.1 MG/DL (ref 2.5–4.5)
PLATELET # BLD: 128 K/UL (ref 138–453)
PLATELET ESTIMATE: ABNORMAL
PMV BLD AUTO: 9.9 FL (ref 8.1–13.5)
POTASSIUM SERPL-SCNC: 3 MMOL/L (ref 3.7–5.3)
POTASSIUM SERPL-SCNC: 3.2 MMOL/L (ref 3.7–5.3)
PRO-BNP: ABNORMAL PG/ML
PROTEIN ELECTROPHORESIS, SERUM: ABNORMAL
RBC # BLD: 2.83 M/UL (ref 4.21–5.77)
RBC # BLD: ABNORMAL 10*6/UL
SEG NEUTROPHILS: 88 % (ref 36–65)
SEGMENTED NEUTROPHILS ABSOLUTE COUNT: 10.11 K/UL (ref 1.5–8.1)
SERUM IFX INTERP: NORMAL
SODIUM BLD-SCNC: 139 MMOL/L (ref 135–144)
SODIUM BLD-SCNC: 142 MMOL/L (ref 135–144)
SPECIMEN DESCRIPTION: NORMAL
SPECIMEN TYPE: NORMAL
TOTAL PROT. SUM,%: 100 % (ref 98–102)
TOTAL PROT. SUM: 5.5 G/DL (ref 6.3–8.2)
TOTAL PROTEIN: 5.4 G/DL (ref 6.4–8.3)
URINE IFX INTERP: NORMAL
URINE IFX SPECIMEN: NORMAL
URINE TOTAL PROTEIN: 279 MG/DL
URINE TOTAL PROTEIN: 279 MG/DL
VOLUME: NORMAL ML
WBC # BLD: 11.5 K/UL (ref 3.5–11.3)
WBC # BLD: ABNORMAL 10*3/UL

## 2020-08-31 PROCEDURE — 71250 CT THORAX DX C-: CPT

## 2020-08-31 PROCEDURE — 6360000002 HC RX W HCPCS: Performed by: INTERNAL MEDICINE

## 2020-08-31 PROCEDURE — 6360000002 HC RX W HCPCS: Performed by: STUDENT IN AN ORGANIZED HEALTH CARE EDUCATION/TRAINING PROGRAM

## 2020-08-31 PROCEDURE — 3600000012 HC SURGERY LEVEL 2 ADDTL 15MIN: Performed by: UROLOGY

## 2020-08-31 PROCEDURE — 88305 TISSUE EXAM BY PATHOLOGIST: CPT

## 2020-08-31 PROCEDURE — 3700000000 HC ANESTHESIA ATTENDED CARE: Performed by: UROLOGY

## 2020-08-31 PROCEDURE — 6370000000 HC RX 637 (ALT 250 FOR IP): Performed by: STUDENT IN AN ORGANIZED HEALTH CARE EDUCATION/TRAINING PROGRAM

## 2020-08-31 PROCEDURE — 99232 SBSQ HOSP IP/OBS MODERATE 35: CPT | Performed by: INTERNAL MEDICINE

## 2020-08-31 PROCEDURE — 2500000003 HC RX 250 WO HCPCS: Performed by: UROLOGY

## 2020-08-31 PROCEDURE — 7100000000 HC PACU RECOVERY - FIRST 15 MIN: Performed by: UROLOGY

## 2020-08-31 PROCEDURE — 2580000003 HC RX 258: Performed by: INTERNAL MEDICINE

## 2020-08-31 PROCEDURE — 6370000000 HC RX 637 (ALT 250 FOR IP): Performed by: INTERNAL MEDICINE

## 2020-08-31 PROCEDURE — 85025 COMPLETE CBC W/AUTO DIFF WBC: CPT

## 2020-08-31 PROCEDURE — 2709999900 HC NON-CHARGEABLE SUPPLY: Performed by: UROLOGY

## 2020-08-31 PROCEDURE — 2709999900 US GUIDED NEEDLE PLACEMENT

## 2020-08-31 PROCEDURE — 36415 COLL VENOUS BLD VENIPUNCTURE: CPT

## 2020-08-31 PROCEDURE — 88344 IMHCHEM/IMCYTCHM EA MLT ANTB: CPT

## 2020-08-31 PROCEDURE — 3700000001 HC ADD 15 MINUTES (ANESTHESIA): Performed by: UROLOGY

## 2020-08-31 PROCEDURE — 2580000003 HC RX 258: Performed by: STUDENT IN AN ORGANIZED HEALTH CARE EDUCATION/TRAINING PROGRAM

## 2020-08-31 PROCEDURE — 51700 IRRIGATION OF BLADDER: CPT

## 2020-08-31 PROCEDURE — 80069 RENAL FUNCTION PANEL: CPT

## 2020-08-31 PROCEDURE — 83880 ASSAY OF NATRIURETIC PEPTIDE: CPT

## 2020-08-31 PROCEDURE — 1200000000 HC SEMI PRIVATE

## 2020-08-31 PROCEDURE — 7100000001 HC PACU RECOVERY - ADDTL 15 MIN: Performed by: UROLOGY

## 2020-08-31 PROCEDURE — 83735 ASSAY OF MAGNESIUM: CPT

## 2020-08-31 PROCEDURE — 0VB03ZX EXCISION OF PROSTATE, PERCUTANEOUS APPROACH, DIAGNOSTIC: ICD-10-PCS | Performed by: UROLOGY

## 2020-08-31 PROCEDURE — 3600000002 HC SURGERY LEVEL 2 BASE: Performed by: UROLOGY

## 2020-08-31 PROCEDURE — 6360000002 HC RX W HCPCS: Performed by: SPECIALIST

## 2020-08-31 RX ORDER — CEFAZOLIN SODIUM 2 G/50ML
SOLUTION INTRAVENOUS PRN
Status: DISCONTINUED | OUTPATIENT
Start: 2020-08-31 | End: 2020-08-31 | Stop reason: SDUPTHER

## 2020-08-31 RX ORDER — FENTANYL CITRATE 50 UG/ML
25 INJECTION, SOLUTION INTRAMUSCULAR; INTRAVENOUS EVERY 5 MIN PRN
Status: DISCONTINUED | OUTPATIENT
Start: 2020-08-31 | End: 2020-08-31 | Stop reason: HOSPADM

## 2020-08-31 RX ORDER — LIDOCAINE HYDROCHLORIDE 10 MG/ML
INJECTION, SOLUTION EPIDURAL; INFILTRATION; INTRACAUDAL; PERINEURAL PRN
Status: DISCONTINUED | OUTPATIENT
Start: 2020-08-31 | End: 2020-08-31 | Stop reason: ALTCHOICE

## 2020-08-31 RX ORDER — DIPHENHYDRAMINE HYDROCHLORIDE 50 MG/ML
12.5 INJECTION INTRAMUSCULAR; INTRAVENOUS
Status: DISCONTINUED | OUTPATIENT
Start: 2020-08-31 | End: 2020-08-31 | Stop reason: HOSPADM

## 2020-08-31 RX ORDER — OXYCODONE HYDROCHLORIDE AND ACETAMINOPHEN 5; 325 MG/1; MG/1
1 TABLET ORAL PRN
Status: DISCONTINUED | OUTPATIENT
Start: 2020-08-31 | End: 2020-08-31 | Stop reason: HOSPADM

## 2020-08-31 RX ORDER — POTASSIUM CHLORIDE 20 MEQ/1
40 TABLET, EXTENDED RELEASE ORAL PRN
Status: DISCONTINUED | OUTPATIENT
Start: 2020-08-31 | End: 2020-09-02 | Stop reason: HOSPADM

## 2020-08-31 RX ORDER — MORPHINE SULFATE 2 MG/ML
2 INJECTION, SOLUTION INTRAMUSCULAR; INTRAVENOUS EVERY 5 MIN PRN
Status: DISCONTINUED | OUTPATIENT
Start: 2020-08-31 | End: 2020-08-31 | Stop reason: HOSPADM

## 2020-08-31 RX ORDER — CIPROFLOXACIN 2 MG/ML
400 INJECTION, SOLUTION INTRAVENOUS
Status: ACTIVE | OUTPATIENT
Start: 2020-08-31 | End: 2020-08-31

## 2020-08-31 RX ORDER — ONDANSETRON 2 MG/ML
4 INJECTION INTRAMUSCULAR; INTRAVENOUS
Status: DISCONTINUED | OUTPATIENT
Start: 2020-08-31 | End: 2020-08-31 | Stop reason: HOSPADM

## 2020-08-31 RX ORDER — LABETALOL HYDROCHLORIDE 5 MG/ML
5 INJECTION, SOLUTION INTRAVENOUS EVERY 10 MIN PRN
Status: DISCONTINUED | OUTPATIENT
Start: 2020-08-31 | End: 2020-08-31 | Stop reason: HOSPADM

## 2020-08-31 RX ORDER — FENTANYL CITRATE 50 UG/ML
INJECTION, SOLUTION INTRAMUSCULAR; INTRAVENOUS PRN
Status: DISCONTINUED | OUTPATIENT
Start: 2020-08-31 | End: 2020-08-31 | Stop reason: SDUPTHER

## 2020-08-31 RX ORDER — OXYCODONE HYDROCHLORIDE AND ACETAMINOPHEN 5; 325 MG/1; MG/1
2 TABLET ORAL PRN
Status: DISCONTINUED | OUTPATIENT
Start: 2020-08-31 | End: 2020-08-31 | Stop reason: HOSPADM

## 2020-08-31 RX ORDER — POTASSIUM CHLORIDE 7.45 MG/ML
10 INJECTION INTRAVENOUS PRN
Status: DISCONTINUED | OUTPATIENT
Start: 2020-08-31 | End: 2020-09-02 | Stop reason: HOSPADM

## 2020-08-31 RX ORDER — PROPOFOL 10 MG/ML
INJECTION, EMULSION INTRAVENOUS PRN
Status: DISCONTINUED | OUTPATIENT
Start: 2020-08-31 | End: 2020-08-31 | Stop reason: SDUPTHER

## 2020-08-31 RX ADMIN — PROPOFOL 20 MG: 10 INJECTION, EMULSION INTRAVENOUS at 10:54

## 2020-08-31 RX ADMIN — CEFTRIAXONE SODIUM 1 G: 1 INJECTION, POWDER, FOR SOLUTION INTRAMUSCULAR; INTRAVENOUS at 17:20

## 2020-08-31 RX ADMIN — CEFAZOLIN SODIUM 2 G: 2 SOLUTION INTRAVENOUS at 10:55

## 2020-08-31 RX ADMIN — MIDODRINE HYDROCHLORIDE 5 MG: 5 TABLET ORAL at 12:20

## 2020-08-31 RX ADMIN — MIDODRINE HYDROCHLORIDE 5 MG: 5 TABLET ORAL at 17:20

## 2020-08-31 RX ADMIN — TAMSULOSIN HYDROCHLORIDE 0.4 MG: 0.4 CAPSULE ORAL at 12:20

## 2020-08-31 RX ADMIN — PROPOFOL 20 MG: 10 INJECTION, EMULSION INTRAVENOUS at 10:58

## 2020-08-31 RX ADMIN — POTASSIUM CHLORIDE 40 MEQ: 1500 TABLET, EXTENDED RELEASE ORAL at 12:19

## 2020-08-31 RX ADMIN — SODIUM CHLORIDE: 4.5 INJECTION, SOLUTION INTRAVENOUS at 05:20

## 2020-08-31 RX ADMIN — IRON SUCROSE 200 MG: 20 INJECTION, SOLUTION INTRAVENOUS at 05:20

## 2020-08-31 RX ADMIN — FENTANYL CITRATE 25 MCG: 50 INJECTION INTRAMUSCULAR; INTRAVENOUS at 10:54

## 2020-08-31 ASSESSMENT — PULMONARY FUNCTION TESTS
PIF_VALUE: 0
PIF_VALUE: 1
PIF_VALUE: 2
PIF_VALUE: 1

## 2020-08-31 ASSESSMENT — PAIN SCALES - GENERAL
PAINLEVEL_OUTOF10: 0

## 2020-08-31 ASSESSMENT — LIFESTYLE VARIABLES: SMOKING_STATUS: 0

## 2020-08-31 ASSESSMENT — PAIN - FUNCTIONAL ASSESSMENT: PAIN_FUNCTIONAL_ASSESSMENT: 0-10

## 2020-08-31 ASSESSMENT — ENCOUNTER SYMPTOMS: SHORTNESS OF BREATH: 1

## 2020-08-31 NOTE — PLAN OF CARE
Problem: Skin Integrity:  Goal: Will show no infection signs and symptoms  Description: Will show no infection signs and symptoms  8/30/2020 2309 by Luisa Ordaz RN  Outcome: Ongoing  8/30/2020 1618 by Emiliano Horan RN  Outcome: Ongoing  Goal: Absence of new skin breakdown  Description: Absence of new skin breakdown  8/30/2020 2309 by Luisa Ordaz RN  Outcome: Ongoing  8/30/2020 1618 by Emiliano Horan RN  Outcome: Ongoing

## 2020-08-31 NOTE — PROGRESS NOTES
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 Shortness of breath    SUBJECTIVE:    Patient seen and examined  He had prostate biopsy today  Results awaited  NO fever chills  HISTORY OF PRESENT ILLNESS:    This is a 44-year-old male who was admitted from [place] for complaints of worsening shortness of breath and swelling in lower extremities. Patient has history of CHF and been following with cardiologist.  Patient was also noted to have acute kidney injury on top of chronic kidney disease and being managed for acute exacerbation of systolic heart failure. Patient was noted to have high PSA of 97.6 and also sclerotic lesion in L1 vertebral body and right iliac bone suspicious for metastatic disease. Oncology consult for further opinion. Past Medical History:   has a past medical history of AAA (abdominal aortic aneurysm) ([address] BPH (benign prostatic hyperplasia), CAD (coronary artery disease), GIB (gastrointestinal bleeding), Hyperlipidemia, Skin cancer, basal cell, Stroke (cerebrum) ([address] and Systolic heart failure ([address] Past Surgical History:   has a past surgical history that includes Coronary artery bypass graft; Vocal Cord Surgery; Skin cancer excision; Eye surgery; and Prostate biopsy (08/31/2020). Medications:    Prior to Admission medications    Medication Sig Start Date End Date Taking?  Authorizing Provider   furosemide (LASIX) 40 MG tablet Take 40 mg by mouth 2 times daily 8/11/20  Yes Historical Provider, MD   tamsulosin (FLOMAX) 0.4 MG capsule Take 0.4 mg by mouth daily   Yes Historical Provider, MD   carvedilol (COREG) 25 MG tablet Take 25 mg by mouth 2 times daily   Yes Historical Provider, family history includes Heart Disease in his brother, father, and mother; Hypertension in his mother. REVIEW OF SYSTEMS:    Constitutional: No fever or chills. No night sweats, no weight loss   Eyes: No eye discharge, double vision, or eye pain   HEENT: negative for sore mouth, sore throat, hoarseness and voice change   Respiratory: negative for cough , sputum, dyspnea, wheezing, hemoptysis, chest pain   Cardiovascular: negative for chest pain, dyspnea, palpitations, orthopnea, PND   Gastrointestinal: negative for nausea, vomiting, diarrhea, constipation, abdominal pain, Dysphagia, hematemesis and hematochezia   Genitourinary: negative for frequency, dysuria, nocturia, urinary incontinence, and hematuria   Integument: negative for rash, skin lesions, bruises.    Hematologic/Lymphatic: negative for easy bruising, bleeding, lymphadenopathy, or petechiae   Endocrine: negative for heat or cold intolerance,weight changes, change in bowel habits and hair loss   Musculoskeletal: negative for myalgias, arthralgias, pain, joint swelling,and bone pain   Neurological: negative for headaches, dizziness, seizures, weakness, numbness    PHYSICAL EXAM:      BP (!) 105/50   Pulse 79   Temp 98.8 °F (37.1 °C) (Oral)   Resp 16   Ht 5' 7\" (1.702 m)   Wt 215 lb (97.5 kg)   SpO2 93%   BMI 33.67 kg/m²    Temp (24hrs), Av °F (37.2 °C), Min:98.1 °F (36.7 °C), Max:100 °F (37.8 °C)    General appearance - well appearing, no in pain or distress   Mental status - alert and cooperative   Eyes - pupils equal and reactive, extraocular eye movements intact   Ears - bilateral TM's and external ear canals normal   Mouth - mucous membranes moist, pharynx normal without lesions   Neck - supple, no significant adenopathy   Lymphatics - no palpable lymphadenopathy, no hepatosplenomegaly   Chest - clear to auscultation, no wheezes, rales or rhonchi, symmetric air entry   Heart - normal rate, regular rhythm, normal S1, S2, no murmurs  Abdomen There appears to be moderate right and mild left-sided hydronephrosis. 3. Otherwise, no gross sonographic abnormality seen of the kidneys. Primary Problem  ASHLEY (acute kidney injury) [facility])    Active Hospital Problems    Diagnosis Date Noted    Gross hematuria [R31.0] 08/29/2020    CKD (chronic kidney disease) stage 3, GFR 30-59 ml/min (HCC) [N18.3] 08/29/2020    Urinary obstruction [N13.9] 08/29/2020    Prostate cancer, primary, with metastasis from prostate to other site [facility]) Clarisse Bullion 08/29/2020    ASHLEY (acute kidney injury) ([facility] [address] [N17.9]     Hydroureteronephrosis [N13.30] 08/28/2020    Acute on chronic systolic heart failure (HCC) [I50.23] 08/28/2020    Coronary artery disease involving coronary bypass graft of native heart without angina pectoris [I25.810] 08/28/2020    Benign prostatic hyperplasia with urinary obstruction [N40.1, N13.8] 08/28/2020    Abdominal aortic aneurysm (AAA) without rupture ([facility] [address] [I71.4] 07/28/2020         IMPRESSION:   1. Elevated PSA with bony lesions suspicious for prostate cancer  2. Obstructive uropathy with bilateral ureteral hydronephrosis  3. CHF  4. ASHLEY/CKD  5. Bone lesions  6. Anemia  7. Mild thrombocytopenia    RECOMMENDATIONS:  1. I reviewed the laboratory data, imaging studies, diagnosis, prognosis and treatment options with patient  2. Patient's bone scan is consistent with metastatic disease in the spine and posterior pelvis  3. I had discussion with patient and his daughter Lydia Benites over the telephone. I explained that prostate cancer is slow-growing and even with metastatic disease can be controlled with androgen depression therapy which is much tolerable  4. Will need tissue diagnosis and he had prostate biopsy  5. Patient and family agreeable  6. We will follow      Discussed with patient and Nurse. Thank you for asking us to see this patient. Mykel Hdz MD  Hematologist/Medical Oncologist    Cell: [phone]      This note is created

## 2020-08-31 NOTE — ANESTHESIA POSTPROCEDURE EVALUATION
Department of Anesthesiology  Postprocedure Note    Patient: Kirsten Patel  MRN: [id number]  YOB: 1933  Date of evaluation: 8/31/2020  Time:  3:53 PM     Procedure Summary     Date:  08/31/20 Room / Location:  [address]    Anesthesia Start:  1291 Anesthesia Stop:  4521    Procedure:  ** ADD ON - PROSTATE BIOPSY WITH ULTRASOUND ([address] NOTIFIED DEPT) (N/A ) Diagnosis:  (PROSTATE CANCER)    Surgeon:  Veean Dutton MD Responsible Provider:  Sultana Sinclair MD    Anesthesia Type:  MAC ASA Status:  4          Anesthesia Type: MAC    Be Phase I: Be Score: 10    Be Phase II:      Last vitals: Reviewed and per EMR flowsheets.        Anesthesia Post Evaluation    Patient location during evaluation: PACU  Patient participation: complete - patient participated  Level of consciousness: awake and alert  Pain score: 0  Nausea & Vomiting: no nausea  Cardiovascular status: hemodynamically stable  Respiratory status: room air  Hydration status: euvolemic

## 2020-08-31 NOTE — ANESTHESIA PRE PROCEDURE
Department of Anesthesiology  Preprocedure Note       Name:  Selena Chanel   Age:  80 y.o.  :  1933                                          MRN:  [id number]         Date:  2020      Surgeon: Som Dao):  Kaelyn Singh MD    Procedure: Procedure(s):  ** ADD ON - PROSTATE BIOPSY WITH ULTRASOUND ([address] NOTIFIED DEPT)    Department of Anesthesiology  Pre-Anesthesia Evaluation/Consultation         Name:  Selena Chanel                                         Age:  80 y.o.   MRN:  [id number]             Medications  Current Facility-Administered Medications   Medication Dose Route Frequency Provider Last Rate Last Dose    ciprofloxacin (CIPRO) IVPB 400 mg  400 mg Intravenous On Call to 110 Shelby Doe MD        iron sucrose (VENOFER) 200 mg in sodium chloride 0.9 % 100 mL IVPB  200 mg Intravenous Q24H Goldy Negron MD   Stopped at 20 0620    0.45 % sodium chloride infusion   Intravenous Continuous Aviva Meneses MD 50 mL/hr at 20 0520      hyoscyamine (LEVSIN/SL) sublingual tablet 125 mcg  125 mcg Sublingual Q6H PRN Layton Vasques MD        cefTRIAXone (ROCEPHIN) 1 g IVPB in 50 mL D5W minibag  1 g Intravenous Q24H Layton Vasques MD   Stopped at 20 1829    [Held by provider] carvedilol (COREG) tablet 25 mg  25 mg Oral BID BAM Casanova CNP        tamsulosin (FLOMAX) capsule 0.4 mg  0.4 mg Oral Daily BAM Casanova CNP   0.4 mg at 20 0910    [Held by provider] aspirin EC tablet 81 mg  81 mg Oral Daily BAM Anderson CNP        lidocaine (XYLOCAINE) 2 % uro-jet   Topical PRN Layton Vasques MD        sodium chloride 0.9 % irrigation 3,000 mL  3,000 mL Irrigation Continuous Layton Vasques MD   3,000 mL at 20 1436    morphine (PF) injection 2 mg  2 mg Intravenous Q4H PRN Aviva Meneses MD        0.9 % sodium chloride bolus  1,000 mL Intravenous Once Aviva Meneses MD   Stopped at 20 2046    midodrine (PROAMATINE) tablet 5 mg  5 mg Oral TID prostatic hyperplasia)     CAD (coronary artery disease)     GIB (gastrointestinal bleeding)     found to have a bleeding spot in the duodenum cauterized and on iron replacement therapy continue. hb 10.2 in 2018 should recheck cbc with dr Kelly Hyman.  Hyperlipidemia     Skin cancer, basal cell     Stroke (cerebrum) (HCC)     Systolic heart failure (HCC)      Past Surgical History:   Procedure Laterality Date    CORONARY ARTERY BYPASS GRAFT      cabg x 4 in  in [facility] [address] SKIN CANCER EXCISION      VOCAL CORD SURGERY      Biopsy     Social History     Tobacco Use    Smoking status: Former Smoker     Years: 15.00     Types: Cigarettes     Last attempt to quit: 1966     Years since quittin.7    Smokeless tobacco: Never Used   Substance Use Topics    Alcohol use: Yes     Frequency: 4 or more times a week     Drinks per session: 1 or 2     Binge frequency: Never    Drug use: Never         Vital Signs (Current)   Vitals:    20   BP: (!)    Pulse: 69   Resp:    Temp: 99.3 °F (37.4 °C)   SpO2: 93%     Vital Signs Statistics (for past 48 hrs)     Temp  Av.7 °F (37.1 °C)  Min: 98.1 °F (36.7 °C)   Min taken time: 20  Max: 100.6 °F (38.1 °C)   Max taken time: 20 1540  Pulse  Av.6  Min: 61   Min taken time: 20 1241  Max: 91   Max taken time: 20 1540  Resp  Av.9  Min: 12   Min taken time: 20 0800  Max: 26   Max taken time: 20 1518  BP  Min: 80/34   Min taken time: 20  Max: 141/67   Max taken time: 20  SpO2  Av.6 %  Min: 93 %   Min taken time: 20 08  Max: 97 %   Max taken time: 20  BP Readings from Last 3 Encounters:   20 (!) /       BMI  Body mass index is 33.67 kg/m².     CBC   Lab Results   Component Value Date    WBC 11.5 2020    RBC 2.83 2020    HGB 8.2 2020    HCT 27.1 2020    MCV 95.8 2020    RDW 13.6 2020     (before breakfast)   Yes Historical Provider, MD   rosuvastatin (CRESTOR) 10 MG tablet Take 10 mg by mouth daily   Yes Historical Provider, MD   aspirin 81 MG EC tablet Take 81 mg by mouth daily   Yes Historical Provider, MD   potassium chloride (KLOR-CON) 10 MEQ extended release tablet Take 20 mEq by mouth daily 8/11/20   Historical Provider, MD   aspirin 81 MG chewable tablet Baby Aspirin 81 mg chewable tablet   Chew 1 tablet every day by oral route.     Historical Provider, MD   linaclotide Clemon Drones) 145 MCG capsule Linzess 145 mcg capsule    Historical Provider, MD       Current medications:    Current Facility-Administered Medications   Medication Dose Route Frequency Provider Last Rate Last Dose    ciprofloxacin (CIPRO) IVPB 400 mg  400 mg Intravenous On Call to 110 Shelby Doe MD        iron sucrose (VENOFER) 200 mg in sodium chloride 0.9 % 100 mL IVPB  200 mg Intravenous Q24H Vaishali Dominguez MD   Stopped at 08/31/20 0620    0.45 % sodium chloride infusion   Intravenous Continuous Ajay Santiago MD 50 mL/hr at 08/31/20 0520      hyoscyamine (LEVSIN/SL) sublingual tablet 125 mcg  125 mcg Sublingual Q6H PRN Keerthi Oropeza MD        cefTRIAXone (ROCEPHIN) 1 g IVPB in 50 mL D5W minibag  1 g Intravenous Q24H Keerthi Oropeza MD   Stopped at 08/30/20 1829    [Held by provider] carvedilol (COREG) tablet 25 mg  25 mg Oral BID BAM Garcia CNP        tamsulosin (FLOMAX) capsule 0.4 mg  0.4 mg Oral Daily BAM Garcia CNP   0.4 mg at 08/30/20 0910    [Held by provider] aspirin EC tablet 81 mg  81 mg Oral Daily BAM Anderson CNP        lidocaine (XYLOCAINE) 2 % uro-jet   Topical PRN Keerthi Oropeza MD        sodium chloride 0.9 % irrigation 3,000 mL  3,000 mL Irrigation Continuous Keerthi Oropeza MD   3,000 mL at 08/29/20 1436    morphine (PF) injection 2 mg  2 mg Intravenous Q4H PRN Ajay Santiago MD        0.9 % sodium chloride bolus  1,000 mL Intravenous Once Ajay Santiago MD Stopped at 08/29/20 2046    midodrine (PROAMATINE) tablet 5 mg  5 mg Oral TID  Nicolas Durbin MD   5 mg at 08/30/20 2115    sodium chloride flush 0.9 % injection 10 mL  10 mL Intravenous 2 times per day Hellen Breeding, APRN - NP   10 mL at 08/29/20 0846    sodium chloride flush 0.9 % injection 10 mL  10 mL Intravenous PRN Hellen Breeding, APRN - NP        acetaminophen (TYLENOL) tablet 650 mg  650 mg Oral Q6H PRN Hellen Breeding, APRN - NP   650 mg at 08/30/20 1655    Or    acetaminophen (TYLENOL) suppository 650 mg  650 mg Rectal Q6H PRN Hellen Breeding, APRN - NP        polyethylene glycol (GLYCOLAX) packet 17 g  17 g Oral Daily PRN Hellen Breeding, APRN - NP   17 g at 08/30/20 1334    promethazine (PHENERGAN) tablet 12.5 mg  12.5 mg Oral Q6H PRN Hellen Breeding, APRN - NP        Or    ondansetron [facility]) injection 4 mg  4 mg Intravenous Q6H PRN Hellen Breeding, APRN - NP        nicotine (NICODERM CQ) 21 MG/24HR 1 patch  1 patch Transdermal Daily PRN Hellen Breeding, APRN - NP           Allergies:  No Known Allergies    Problem List:    Patient Active Problem List   Diagnosis Code    [facility] N17.9    Hydroureteronephrosis N13.30    Hyperkalemia E87.5    Acute on chronic systolic heart failure (HCC) I50.23    Coronary artery disease involving coronary bypass graft of native heart without angina pectoris I25.810    Benign prostatic hyperplasia with urinary obstruction N40.1, N13.8    Abdominal aortic aneurysm (AAA) without rupture ([facility] I71.4    Coronary arteriosclerosis in native artery I25.10    Hypercholesterolemia E78.00    Mixed hyperlipidemia E78.2    Upper gastrointestinal bleeding K92.2    Chronic systolic heart failure (HCC) I50.22    Benign hypertension with chronic kidney disease, stage II I12.9, N18.2    LBBB (left bundle branch block) I44.7    Gross hematuria R31.0    CKD (chronic kidney disease) stage 3, GFR 30-59 ml/min ([facility]) N18.3    Urinary obstruction N13.9 intravenous.                           Sirena Ortiz MD   8/31/2020

## 2020-08-31 NOTE — PROGRESS NOTES
[unfilled]    [facility] [address]    Progress Note    8/31/2020    4:28 PM    Name:   Celestino Clements  MRN:     [id number]     Acct:      [de-identified]   Room:   [facility] Day:  3  Admit Date:  8/28/2020  7:53 PM    PCP:   BAM Quezada CNP  Code Status:  Full Code    Subjective:     C/C: No chief complaint on file. Interval History Status: improved. Patient seen and examined with no family at the bedside. Patient had prostate biopsy obtained by urology consult this morning. Patient remains off CBI for now. 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 Patient denies any pain, fever or chills. Brief History:     Celestino Clements is a 80 y.o. Non-/non  male who presents with No chief complaint on file. and is admitted to the hospital for the management of acute renal failure superimposed on chronic kidney disease stage II with Obstructive uropathy with hydroureteronephrosis, and exacerbation of systolic heart failure. Patient was sent to [place] [address] as a direct admit from [facility].  Patient presented to [facility] for the complaint of increased shortness of breath and swelling of the lower extremities after being called by his provider stating he needed to be seen in the emergency room for the elevated BUN and creatinine on repeat labs after increasing Lasix and potassium secondary to fluid overload acute exacerbation of heart failure in the outpatient setting . Patient states the swelling of the lower extremities  has been ongoing for approximately the past month and was recently referred and seen by cardiologist Dr. Malik Rushing who increased his Lasix to 40 mg and potassium to 20 mEq.   However patient did not have any significant improvement in the swelling worsened, he apparently had a fall 2 weeks ago in the grass which he does not describe as syncopal which he states is \"his legs just got weak\", after discussion with his family and encouragement he then decided to seek treatment in the emergency room 2 days after the fall. He currently denies any chest pain, dizziness lightheadedness syncopal or presyncopal symptomology, nausea vomiting diarrhea. He denies any dysuria urinary frequency urgency incomplete emptying or changes in his bowel or bladder, as he states that he has obstipation in which he takes Linzess which then causes diarrhea. Review of Systems:     Constitutional:  negative for chills, fevers, sweats  Respiratory:  negative for cough, dyspnea on exertion, shortness of breath, wheezing  Cardiovascular:  negative for chest pain, chest pressure/discomfort, lower extremity edema, palpitations  Gastrointestinal:  negative for abdominal pain, constipation, diarrhea, nausea, vomiting  Neurological:  negative for dizziness, headache    Medications:      Allergies:  No Known Allergies    Current Meds:   Scheduled Meds:    ciprofloxacin  400 mg Intravenous On Call to OR    iron sucrose  200 mg Intravenous Q24H    cefTRIAXone (ROCEPHIN) IV  1 g Intravenous Q24H    [Held by provider] carvedilol  25 mg Oral BID    tamsulosin  0.4 mg Oral Daily    [Held by provider] aspirin  81 mg Oral Daily    sodium chloride  1,000 mL Intravenous Once    midodrine  5 mg Oral TID WC    sodium chloride flush  10 mL Intravenous 2 times per day     Continuous Infusions:    sodium chloride 50 mL/hr at 08/31/20 0520    sodium chloride       PRN Meds: potassium chloride **OR** potassium alternative oral replacement **OR** potassium chloride, hyoscyamine, lidocaine, morphine, sodium chloride flush, acetaminophen **OR** acetaminophen, polyethylene glycol, promethazine **OR** ondansetron, nicotine    Data:     Past Medical History:   has a past medical history of AAA (abdominal aortic aneurysm) ([facility] [address] BPH (benign prostatic hyperplasia), CAD (coronary artery disease), GIB (gastrointestinal bleeding), Hyperlipidemia, Skin cancer, basal cell, Stroke (cerebrum) ([facility] [address] and [facility] Social History:   reports that he quit smoking about 54 years ago. His smoking use included cigarettes. He quit after 15.00 years of use. He has never used smokeless tobacco. He reports current alcohol use. He reports that he does not use drugs. Family History:   Family History   Problem Relation Age of Onset    Heart Disease Mother     Hypertension Mother     Heart Disease Father     Heart Disease Brother        Vitals:  BP (!) 106/51   Pulse 76   Temp 99 °F (37.2 °C) (Oral)   Resp 20   Ht 5' 7\" (1.702 m)   Wt 215 lb (97.5 kg)   SpO2 95%   BMI 33.67 kg/m²   Temp (24hrs), Av.1 °F (37.3 °C), Min:98.1 °F (36.7 °C), Max:100 °F (37.8 °C)    No results for input(s): POCGLU in the last 72 hours. I/O (24Hr):     Intake/Output Summary (Last 24 hours) at 2020 1628  Last data filed at 2020 1114  Gross per 24 hour   Intake 100 ml   Output 3750 ml   Net -3650 ml       Labs:  Hematology:  Recent Labs     20  0954 20  0846 20  0924   WBC 5.4 8.4 11.5*   RBC 2.96* 2.93* 2.83*   HGB 8.5* 8.5* 8.2*   HCT 28.1* 28.2* 27.1*   MCV 94.9 96.2 95.8   MCH 28.7 29.0 29.0   MCHC 30.2 30.1 30.3   RDW 13.9 13.7 13.6    136* 128*   MPV 10.6 9.8 9.9     Chemistry:  Recent Labs     20  2106 20  0105 20  0607  20  0954 20  1238 20  0629 20  0846 20  0829 20  0924     --   --   --  143 139 142  --  139 142   K 5.2  --   --   --  4.3 4.5 3.6*  --  3.0* 3.2*     --   --   --  104 104 103  --  101 103   CO2 18*  --   --   --  21 16* 26  --  25 25   GLUCOSE 123*  --   --   --  100* 158* 113*  --  107* 109*   *  --   --   --  92* 94* 68*  --  40* 40*   CREATININE 7.76*  --   --   --  6.01* 6.04* 3.96*  --  2.44* 2.30*   MG  --   --   --   --  2.3  --  2.0  --   --  1.8   ANIONGAP 19*  --   --   --  18* 19* 13  --  13 14   LABGLOM 7*  --   --   --  9* 9* 14*  --  25* 27*   GFRAA 8*  --   --   --  11* 11* 18*  --  31* 33*   CALCIUM 7.5*  --   --   --  7.5* 7.6* 7.4*  --  7.0* 7.0*   CAION  --   --   --   --   --   --   --  0.91*  --   --    PHOS  --   --   --    < > 5.9*  --  4.4  --  3.1 3.1   PSA 97.66*  --   --   --   --   --   --   --   --   --    PROBNP 12,952*  --   --   --  62,111*  --   --   --   --  15,438*   TROPHS 103* 105* 105*  --   --  106*  --   --   --   --    CKTOTAL  --   --   --   --  99  --   --   --   --   --    MYOGLOBIN 127* 124* 113*  --   --  140*  --   --   --   --     < > = values in this interval not displayed. Recent Labs     08/28/20  2106 08/29/20  0954 08/29/20  1238 08/30/20  0629 08/31/20  0829 08/31/20  0924   PROT 6.1* 6.0* 5.4*  --   --   --    LABALBU 3.4* 3.5  3.5  --  3.5 3.1* 3.3*   AST 12 11  --   --   --   --    ALT 11 10  --   --   --   --    ALKPHOS 93 93  --   --   --   --    BILITOT 0.44 0.56  --   --   --   --    BILIDIR 0.24 0.27  --   --   --   --      ABG:No results found for: POCPH, PHART, PH, POCPCO2, MBJ2TVJ, PCO2, POCPO2, PO2ART, PO2, POCHCO3, OSL8IGN, HCO3, NBEA, PBEA, BEART, BE, THGBART, THB, BBM8VNV, YZKZ6SFM, E0XELGGT, O2SAT, FIO2  Lab Results   Component Value Date/Time    SPECIAL NOT REPORTED 08/30/2020 11:50 AM     Lab Results   Component Value Date/Time    CULTURE NO SIGNIFICANT GROWTH 08/30/2020 11:50 AM       Radiology:  Nm Bone Scan Whole Body    Result Date: 8/29/2020  Findings consistent with 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 Question small lesions within the ribs. Us Retroperitoneal Limited    Result Date: 8/28/2020  1. Evaluation is limited due to patient body habitus as well as bowel gas and rib shadowing.  2. There appears to be moderate right and mild left-sided a.m. labs. Plan details discussed with patient, wife and daughter and all concerns and, questions were addressed.     Hilary Urbina MD  8/31/2020  4:28 PM

## 2020-08-31 NOTE — OP NOTE
Dr. Emile Haro MD  Urologic Surgery      [address]. [place]  08/31/20    Patient:  Raissa Joshi  MRN: [id number]  YOB: 1933    Surgeon: Dr. Emile Haro MD  Assistant: None    Pre-op Diagnosis: Elevated PSA of 97. Bony lesion. Post-op Diagnosis: Same    Procedure:   1. Trans-Rectal Ultrasound Guided Biopsy of the Prostate    Findings:        --Gland Size: 94mL       --Prostate Lesions: None    Anesthesia: Monitor Anesthesia Care  Complications: None  OR Blood Loss: Minimal  Fluids: Cystalloids  Specimens:  ID Type Source Tests Collected by Time Destination   A : LLB Tissue Prostate SURGICAL PATHOLOGY Emile Haro MD 8/31/2020 1022    B : LB Tissue Prostate SURGICAL PATHOLOGY Emile Haro MD 8/31/2020 1022    C : LLM Tissue Prostate SURGICAL PATHOLOGY Emile Haro MD 8/31/2020 1022    D : LM Tissue Prostate SURGICAL PATHOLOGY Emile Haro MD 8/31/2020 1022    E : LLA Tissue Prostate SURGICAL PATHOLOGY Emile Haro MD 8/31/2020 1022    F : LA Tissue Prostate SURGICAL PATHOLOGY Emile Haro MD 8/31/2020 1022    G : RB Tissue Prostate SURGICAL PATHOLOGY Emile Haro MD 8/31/2020 1022    H : RLB Tissue Prostate SURGICAL PATHOLOGY Emile Haro MD 8/31/2020 1022    I : RM Tissue Prostate SURGICAL PATHOLOGY Emile Haro MD 8/31/2020 1022    J : RLSANDI Tissue Prostate SURGICAL PATHOLOGY Emile Haro MD 8/31/2020 1022    K : RA Tissue Prostate SURGICAL PATHOLOGY Emile Haro MD 8/31/2020 1022    L : RLYENIFER Tissue Prostate SURGICAL PATHOLOGY Emile Haro MD 8/31/2020 1022        Indication:  Patient is a 80year old male with a PSA of 97 while in retention. He also had a bony lesion concerning for prostate cancer metastasis. Due to this we recommended a prostate biopsy. After risks and benefits were explained he elected to proceed with todays surgery. Narrative of the Procedure:    After informed consent was obtain, the patient was taken to the operative suite.  He

## 2020-08-31 NOTE — PROGRESS NOTES
Urology Progress Note    Subjective: Camron Cancino is a 80 y.o. male. His/Her current Diet is: Diet NPO, After Midnight. The patient is * No surgery found * from     No acute events overnight. No chest pain, shortness of breath, nausea, vomiting, fevers, chills  Tolerating Panchal catheter. No hand irrigations overnight      Patient Vitals for the past 24 hrs:   BP Temp Temp src Pulse Resp SpO2 Weight   08/31/20 0403 (!) 102/32 -- -- -- -- -- --   08/31/20 0402 (!) 95/34 -- -- 74 -- -- --   08/30/20 2021 (!) 91/39 -- -- 70 -- -- --   08/30/20 2009 (!) 86/39 -- -- 69 -- -- --   08/30/20 1958 (!) 80/34 -- -- -- -- -- --   08/30/20 1956 (!) 83/31 -- -- 72 18 93 % --   08/30/20 1954 -- 98.1 °F (36.7 °C) Oral -- -- -- --   08/30/20 1540 (!) 125/48 100.6 °F (38.1 °C) Oral 91 24 93 % --   08/30/20 1134 (!) 124/49 98.3 °F (36.8 °C) Oral 85 20 94 % --   08/30/20 0800 (!) 124/50 98.4 °F (36.9 °C) Oral 77 16 94 % --   08/30/20 0634 -- -- -- -- -- -- 220 lb (99.8 kg)       Intake/Output Summary (Last 24 hours) at 8/31/2020 0615  Last data filed at 8/30/2020 1848  Gross per 24 hour   Intake --   Output 3850 ml   Net -3850 ml       Recent Labs     08/28/20  2106 08/29/20  0954 08/30/20  0846   WBC 5.5 5.4 8.4   HGB 8.8* 8.5* 8.5*   HCT 28.8* 28.1* 28.2*   MCV 98.0 94.9 96.2   * 157 136*     Recent Labs     08/29/20  0954 08/29/20  1238 08/30/20  0629    139 142   K 4.3 4.5 3.6*    104 103   CO2 21 16* 26   PHOS 5.9*  --  4.4   BUN 92* 94* 68*   CREATININE 6.01* 6.04* 3.96*       Recent Labs     08/29/20  0428   COLORU RED*   PHUR 7.5   WBCUA 0 TO 2   RBCUA TOO NUMEROUS TO COUNT   MUCUS NOT REPORTED   TRICHOMONAS NOT REPORTED   YEAST NOT REPORTED   BACTERIA NOT REPORTED   SPECGRAV 1.015   LEUKOCYTESUR SMALL*   UROBILINOGEN ELEVATED*   BILIRUBINUR NEGATIVE       Physical Exam:     NAD, AOx3  RRR.  Peripheral pulses palpable  Respirations nonlabored, symmetric chest rise bilaterally  Abdomen: soft, nontender, nondistended  Lower extremities: No edema of calf tenderness bilaterally  Panchal 24F 3 way catheter on CBI with translucent pink tinged urine, on slow drip CBI     Interval Imaging Findings:    Imaging was independently reviewed and checked with radiologist report: bone scan consistent with mets to spine and pelvis    Impression:      Marissa Song is a 80 y.o. male admitted with      Problem List  - Urinary Retention, Panchal for 3L  - Bilateral hydroureteronephrosis  - Post obstructive diuresis, physiologic  - Gross hematuria  - Elevated PSA, 97  - Osseus lesions on spine L1 and R Iliac bone  - Anemia, unclear etiology    Plan:     - AUR: Maintain Panchal catheter until Cr nadirs and post-obstructive diuresis resolves  - Gross hematuria improving; CBI stopped this am - will monitor today am   - Drink to thirst given physiologic POD and CHF  - Monitor Hb. Aspirin on hold/ am labs pending   - Monitor Cr and UOP. Special attention to K, Mg, and P  - Elevated PSA worrisome for metastatic prostate cancer given concerning osseus lesions noted on Bone scan/ However he did have fever of 100.6 yesterday / urine Cx negative / will try to schedule prostate biopsy once source of infection is identified.    Oncology recommends biopsy from, primary and bone lesion     [facility]  Urology Resident, PGY3  6:15 AM 8/31/2020

## 2020-08-31 NOTE — FLOWSHEET NOTE
PT Emiliano Goldstein was at CT scan I spoke with family members in the room. 08/31/20 1325   Encounter Summary   Services provided to: Family   Referral/Consult From: [facility] Family members   Continue Visiting   (8/31/2020)   Complexity of Encounter Low   Length of Encounter 15 minutes   Routine   Type Initial   Assessment Approachable;Coping; Hopeful   Intervention Nurtured hope;[place]   Outcome Expressed gratitude

## 2020-09-01 LAB
ALBUMIN SERPL-MCNC: 2.9 G/DL (ref 3.5–5.2)
ANION GAP SERPL CALCULATED.3IONS-SCNC: 11 MMOL/L (ref 9–17)
BUN BLDV-MCNC: 33 MG/DL (ref 8–23)
BUN/CREAT BLD: ABNORMAL (ref 9–20)
CALCIUM SERPL-MCNC: 7.1 MG/DL (ref 8.6–10.4)
CHLORIDE BLD-SCNC: 105 MMOL/L (ref 98–107)
CO2: 25 MMOL/L (ref 20–31)
CREAT SERPL-MCNC: 1.95 MG/DL (ref 0.7–1.2)
GFR AFRICAN AMERICAN: 40 ML/MIN
GFR NON-AFRICAN AMERICAN: 33 ML/MIN
GFR SERPL CREATININE-BSD FRML MDRD: ABNORMAL ML/MIN/{1.73_M2}
GFR SERPL CREATININE-BSD FRML MDRD: ABNORMAL ML/MIN/{1.73_M2}
GLUCOSE BLD-MCNC: 112 MG/DL (ref 70–99)
HCT VFR BLD CALC: 25.3 % (ref 40.7–50.3)
HCT VFR BLD CALC: 28.3 % (ref 40.7–50.3)
HEMOGLOBIN: 7.7 G/DL (ref 13–17)
HEMOGLOBIN: 8.6 G/DL (ref 13–17)
MCH RBC QN AUTO: 29.4 PG (ref 25.2–33.5)
MCHC RBC AUTO-ENTMCNC: 30.4 G/DL (ref 28.4–34.8)
MCV RBC AUTO: 96.6 FL (ref 82.6–102.9)
NRBC AUTOMATED: 0 PER 100 WBC
PDW BLD-RTO: 13.6 % (ref 11.8–14.4)
PHOSPHORUS: 2.9 MG/DL (ref 2.5–4.5)
PLATELET # BLD: 132 K/UL (ref 138–453)
PMV BLD AUTO: 10.8 FL (ref 8.1–13.5)
POTASSIUM SERPL-SCNC: 3.4 MMOL/L (ref 3.7–5.3)
RBC # BLD: 2.62 M/UL (ref 4.21–5.77)
SODIUM BLD-SCNC: 141 MMOL/L (ref 135–144)
WBC # BLD: 10.3 K/UL (ref 3.5–11.3)

## 2020-09-01 PROCEDURE — 1200000000 HC SEMI PRIVATE

## 2020-09-01 PROCEDURE — 99232 SBSQ HOSP IP/OBS MODERATE 35: CPT | Performed by: INTERNAL MEDICINE

## 2020-09-01 PROCEDURE — 36415 COLL VENOUS BLD VENIPUNCTURE: CPT

## 2020-09-01 PROCEDURE — 97530 THERAPEUTIC ACTIVITIES: CPT

## 2020-09-01 PROCEDURE — 2580000003 HC RX 258: Performed by: STUDENT IN AN ORGANIZED HEALTH CARE EDUCATION/TRAINING PROGRAM

## 2020-09-01 PROCEDURE — 6370000000 HC RX 637 (ALT 250 FOR IP): Performed by: INTERNAL MEDICINE

## 2020-09-01 PROCEDURE — 80069 RENAL FUNCTION PANEL: CPT

## 2020-09-01 PROCEDURE — 6370000000 HC RX 637 (ALT 250 FOR IP): Performed by: STUDENT IN AN ORGANIZED HEALTH CARE EDUCATION/TRAINING PROGRAM

## 2020-09-01 PROCEDURE — 6360000002 HC RX W HCPCS: Performed by: STUDENT IN AN ORGANIZED HEALTH CARE EDUCATION/TRAINING PROGRAM

## 2020-09-01 PROCEDURE — 85027 COMPLETE CBC AUTOMATED: CPT

## 2020-09-01 PROCEDURE — 97166 OT EVAL MOD COMPLEX 45 MIN: CPT

## 2020-09-01 PROCEDURE — 97535 SELF CARE MNGMENT TRAINING: CPT

## 2020-09-01 PROCEDURE — 6370000000 HC RX 637 (ALT 250 FOR IP): Performed by: NURSE PRACTITIONER

## 2020-09-01 PROCEDURE — 85018 HEMOGLOBIN: CPT

## 2020-09-01 PROCEDURE — 85014 HEMATOCRIT: CPT

## 2020-09-01 PROCEDURE — 97162 PT EVAL MOD COMPLEX 30 MIN: CPT

## 2020-09-01 RX ORDER — DIPHENHYDRAMINE HCL 25 MG
25 TABLET ORAL ONCE
Status: COMPLETED | OUTPATIENT
Start: 2020-09-01 | End: 2020-09-01

## 2020-09-01 RX ORDER — MIDODRINE HYDROCHLORIDE 5 MG/1
10 TABLET ORAL
Status: DISCONTINUED | OUTPATIENT
Start: 2020-09-01 | End: 2020-09-02 | Stop reason: HOSPADM

## 2020-09-01 RX ADMIN — POTASSIUM CHLORIDE 40 MEQ: 1500 TABLET, EXTENDED RELEASE ORAL at 09:28

## 2020-09-01 RX ADMIN — IRON SUCROSE 200 MG: 20 INJECTION, SOLUTION INTRAVENOUS at 07:15

## 2020-09-01 RX ADMIN — SODIUM CHLORIDE: 4.5 INJECTION, SOLUTION INTRAVENOUS at 16:59

## 2020-09-01 RX ADMIN — MIDODRINE HYDROCHLORIDE 10 MG: 5 TABLET ORAL at 16:59

## 2020-09-01 RX ADMIN — DIPHENHYDRAMINE HCL 25 MG: 25 TABLET ORAL at 23:32

## 2020-09-01 RX ADMIN — CEFTRIAXONE SODIUM 1 G: 1 INJECTION, POWDER, FOR SOLUTION INTRAMUSCULAR; INTRAVENOUS at 16:59

## 2020-09-01 RX ADMIN — POLYETHYLENE GLYCOL 3350 17 G: 17 POWDER, FOR SOLUTION ORAL at 17:50

## 2020-09-01 RX ADMIN — TAMSULOSIN HYDROCHLORIDE 0.4 MG: 0.4 CAPSULE ORAL at 09:28

## 2020-09-01 RX ADMIN — MIDODRINE HYDROCHLORIDE 5 MG: 5 TABLET ORAL at 09:28

## 2020-09-01 RX ADMIN — MIDODRINE HYDROCHLORIDE 5 MG: 5 TABLET ORAL at 12:06

## 2020-09-01 ASSESSMENT — PAIN SCALES - GENERAL
PAINLEVEL_OUTOF10: 0

## 2020-09-01 NOTE — CARE COORDINATION
Case Management Initial Discharge Plan  Parth Choudhury,         Met with:patient to discuss discharge plans. Information verified: address, contacts, phone number, , insurance Yes    Emergency Contact/Next of Kin name & number: pt's leora [place] [phone]    PCP: BAM Dennison CNP  Date of last visit: 1 month ago    Insurance Provider: Medicare- primary, GEHA- secondary    Discharge Planning    Living Arrangements:  Spouse/Significant Other   Support Systems:  Spouse/Significant Other, Children    Home has 1 story  1 step to climb to get into front door    Patient able to perform ADL's:Independent    Current Services (outpatient & in home) none  DME equipment: Rollator walker, Cane, shower chair  DME provider: n/a    Receiving oral anticoagulation therapy? No    If indicated:   Physician managing anticoagulation treatment: n/a  Where does patient obtain lab work for ATC treatment? n/a      Potential Assistance Needed:  Home Care    Patient agreeable to home care: Yes  Freedom of choice provided:  yes, list provided    Prior SNF/Rehab Placement and Facility: none  Agreeable to SNF/Rehab: No  [place] of choice provided: n/a     Evaluation: n/a    Expected Discharge date:  20    Patient expects to be discharged to:  Home  Follow Up Appointment: Best Day/ Time: Thursday PM    Transportation provider: wife  Transportation arrangements needed for discharge: No    Readmission Risk              Risk of Unplanned Readmission:        26         Does patient have a readmission risk score greater than 14?: Yes  If yes, follow-up appointment must be made within 7 days of discharge. Goals of Care:       Discharge Plan: Home w/ HC, needs HC choice, has transportation, will need PCP F/U appt. [phone] Referral sent to [facility] as requested. [phone] Confirmed with Suzy with [facility] that referral was received and they can accept pt.        Electronically signed by Jess Worthy

## 2020-09-01 NOTE — PROGRESS NOTES
Urology Progress Note    Subjective: Lynda Will is a 80 y.o. male. His/Her current Diet is: DIET GENERAL;. The patient is POD 1 from prostate biopsy / 12 core    No acute events overnight. No chest pain, shortness of breath, nausea, vomiting, fevers, chills  Tolerating Panchal catheter. CBI restarted and hand irrigated once last night      Patient Vitals for the past 24 hrs:   BP Temp Temp src Pulse Resp SpO2 Height Weight   09/01/20 0356 -- -- -- -- -- -- -- 218 lb (98.9 kg)   08/31/20 2143 (!) 107/45 98.1 °F (36.7 °C) Oral 74 14 96 % -- --   08/31/20 1635 (!) 105/50 98.8 °F (37.1 °C) Oral 79 16 93 % -- --   08/31/20 1205 (!) 106/51 99 °F (37.2 °C) Oral 76 20 95 % -- --   08/31/20 1136 -- -- -- 72 -- -- -- --   08/31/20 1130 (!) 98/32 98.6 °F (37 °C) -- 72 25 95 % -- --   08/31/20 1129 -- -- -- 73 21 96 % -- --   08/31/20 1110 (!) 80/24 99.3 °F (37.4 °C) -- 78 16 92 % -- --   08/31/20 1006 (!) 104/49 100 °F (37.8 °C) Temporal 74 16 95 % 5' 7\" (1.702 m) 215 lb (97.5 kg)   08/31/20 0808 (!) 91/42 99.3 °F (37.4 °C) -- 69 -- 93 % -- --   08/31/20 0636 -- -- -- -- -- -- -- 215 lb (97.5 kg)       Intake/Output Summary (Last 24 hours) at 9/1/2020 2131  Last data filed at 9/1/2020 0630  Gross per 24 hour   Intake 3788 ml   Output 350 ml   Net 3438 ml       Recent Labs     08/29/20  0954 08/30/20  0846 08/31/20  0924   WBC 5.4 8.4 11.5*   HGB 8.5* 8.5* 8.2*   HCT 28.1* 28.2* 27.1*   MCV 94.9 96.2 95.8    136* 128*     Recent Labs     08/30/20  0629 08/31/20  0829 08/31/20  0924    139 142   K 3.6* 3.0* 3.2*    101 103   CO2 26 25 25   PHOS 4.4 3.1 3.1   BUN 68* 40* 40*   CREATININE 3.96* 2.44* 2.30*       No results for input(s): COLORU, PHUR, LABCAST, WBCUA, RBCUA, MUCUS, TRICHOMONAS, YEAST, BACTERIA, CLARITYU, SPECGRAV, LEUKOCYTESUR, UROBILINOGEN, BILIRUBINUR, BLOODU in the last 72 hours. Invalid input(s): NITRATE, GLUCOSEUKETONESUAMORPHOUS    Physical Exam:     NAD, AOx3  RRR.  Peripheral pulses palpable  Respirations nonlabored, symmetric chest rise bilaterally  Abdomen: soft, nontender, nondistended  Lower extremities: No edema of calf tenderness bilaterally  Panchal 24F 3 way catheter on CBI with translucent pink tinged urine, on slow drip CBI     Interval Imaging Findings:    Imaging was independently reviewed and checked with radiologist report: bone scan consistent with mets to spine and pelvis    Impression:      Chanel Son is a 80 y.o. male admitted with      Problem List    POD 1 from Trans-Rectal Ultrasound Guided Biopsy of the Prostate  - Urinary Retention, Panchal for 3L  - Bilateral hydroureteronephrosis  - Post obstructive diuresis, physiologic  - Gross hematuria  - Elevated PSA, 97  - Osseus lesions on spine L1 and R Iliac bone  - Anemia, unclear etiology    Plan:     - AUR: Maintain Panchal catheter  and post-obstructive diuresis resolves. Panchal exchange vs Void trial when following up with Dr Roark Klinefelter. - Gross hematuria restarted after prostate biopsy / some hematuria, hematochezia and hematospermia expected after prostate biopsy; CBI stopped this am - will monitor today am   - Drink to thirst given physiologic POD and CHF  - Monitor Hb. Aspirin on hold/ am labs pending   - Monitor Cr and UOP.  Special attention to K, Mg, and P  - FU prostate biopsy results and follow up with Dr. Roark Klinefelter in [facility] to discuss results   - If doing ok without CBI this afternoon , can plan [address] [place]  Urology Resident, PGY3  6:32 AM 9/1/2020

## 2020-09-01 NOTE — PROGRESS NOTES
family and encouragement he then decided to seek treatment in the emergency room 2 days after the fall. He currently denies any chest pain, dizziness lightheadedness syncopal or presyncopal symptomology, nausea vomiting diarrhea. He denies any dysuria urinary frequency urgency incomplete emptying or changes in his bowel or bladder, as he states that he has obstipation in which he takes Linzess which then causes diarrhea. Review of Systems:     Constitutional:  negative for chills, fevers, sweats  Respiratory:  negative for cough, dyspnea on exertion, shortness of breath, wheezing  Cardiovascular:  negative for chest pain, chest pressure/discomfort, lower extremity edema, palpitations  Gastrointestinal:  negative for abdominal pain, constipation, diarrhea, nausea, vomiting  Neurological:  negative for dizziness, headache    Medications:      Allergies:  No Known Allergies    Current Meds:   Scheduled Meds:    iron sucrose  200 mg Intravenous Q24H    cefTRIAXone (ROCEPHIN) IV  1 g Intravenous Q24H    [Held by provider] carvedilol  25 mg Oral BID    tamsulosin  0.4 mg Oral Daily    [Held by provider] aspirin  81 mg Oral Daily    sodium chloride  1,000 mL Intravenous Once    midodrine  5 mg Oral TID WC    sodium chloride flush  10 mL Intravenous 2 times per day     Continuous Infusions:    sodium chloride 50 mL/hr at 08/31/20 0520    sodium chloride       PRN Meds: potassium chloride **OR** potassium alternative oral replacement **OR** potassium chloride, hyoscyamine, lidocaine, morphine, sodium chloride flush, acetaminophen **OR** acetaminophen, polyethylene glycol, promethazine **OR** ondansetron, nicotine    Data:     Past Medical History:   has a past medical history of AAA (abdominal aortic aneurysm) ([facility] [address] BPH (benign prostatic hyperplasia), CAD (coronary artery disease), GIB (gastrointestinal bleeding), Hyperlipidemia, Skin cancer, basal cell, Stroke (cerebrum) ([facility] [address] and Systolic heart failure ([facility] Social History:   reports that he quit smoking about 54 years ago. His smoking use included cigarettes. He quit after 15.00 years of use. He has never used smokeless tobacco. He reports current alcohol use. He reports that he does not use drugs. Family History:   Family History   Problem Relation Age of Onset    Heart Disease Mother     Hypertension Mother     Heart Disease Father     Heart Disease Brother        Vitals:  BP (!) 99/46   Pulse 68   Temp 98.6 °F (37 °C) (Oral)   Resp 16   Ht 5' 7\" (1.702 m)   Wt 218 lb (98.9 kg)   SpO2 97%   BMI 34.14 kg/m²   Temp (24hrs), Av.5 °F (36.9 °C), Min:98.1 °F (36.7 °C), Max:98.8 °F (37.1 °C)    No results for input(s): POCGLU in the last 72 hours. I/O (24Hr):     Intake/Output Summary (Last 24 hours) at 2020 1325  Last data filed at 2020 0646  Gross per 24 hour   Intake 3688 ml   Output -1400 ml   Net 5088 ml       Labs:  Hematology:  Recent Labs     20  0846 20  0920  0658   WBC 8.4 11.5* 10.3   RBC 2.93* 2.83* 2.62*   HGB 8.5* 8.2* 7.7*   HCT 28.2* 27.1* 25.3*   MCV 96.2 95.8 96.6   MCH 29.0 29.0 29.4   MCHC 30.1 30.3 30.4   RDW 13.7 13.6 13.6   * 128* 132*   MPV 9.8 9.9 10.8     Chemistry:  Recent Labs     20  0629 20  0846 20  0829 20  0920  0658     --  139 142 141   K 3.6*  --  3.0* 3.2* 3.4*     --  101 103 105   CO2 26  --  25 25 25   GLUCOSE 113*  --  107* 109* 112*   BUN 68*  --  40* 40* 33*   CREATININE 3.96*  --  2.44* 2.30* 1.95*   MG 2.0  --   --  1.8  --    ANIONGAP 13  --  13 14 11   LABGLOM 14*  --  25* 27* 33*   GFRAA 18*  --  31* 33* 40*   CALCIUM 7.4*  --  7.0* 7.0* 7.1*   CAION  --  0.91*  --   --   --    PHOS 4.4  --  3.1 3.1 2.9   PROBNP  --   --   --  15,438*  --      Recent Labs     20  0829 20  0924 20  0658   LABALBU 3.1* 3.3* 2.9*     ABG:No results found for: POCPH, PHART, PH, POCPCO2, HMI7OON, PCO2, POCPO2, PO2ART, PO2, POCHCO3, BFM0PTV, HCO3, NBEA, PBEA, BEART, BE, THGBART, THB, DPG8HGB, QQZQ1OPE, Z2ZKEFES, O2SAT, FIO2  Lab Results   Component Value Date/Time    SPECIAL NOT REPORTED 08/30/2020 11:50 AM     Lab Results   Component Value Date/Time    CULTURE NO SIGNIFICANT GROWTH 08/30/2020 11:50 AM       Radiology:  Nm Bone Scan Whole Body    Result Date: 8/29/2020  Findings consistent with 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 Question small lesions within the ribs. Us Retroperitoneal Limited    Result Date: 8/28/2020  1. Evaluation is limited due to patient body habitus as well as bowel gas and rib shadowing. 2. There appears to be moderate right and mild left-sided hydronephrosis. 3. Otherwise, no gross sonographic abnormality seen of the kidneys.        Physical Examination:        General appearance:  alert, cooperative and no distress  Mental Status:  oriented to person, place and time and normal affect  Lungs:  clear to auscultation bilaterally, normal effort  Heart:  regular rate and rhythm, no murmur  Abdomen:  soft, nontender, nondistended, normal bowel sounds, no masses, hepatomegaly, splenomegaly  Extremities:  no edema, redness, tenderness in the calves  Skin:  no gross lesions, rashes, induration    Assessment:        Hospital Problems           Last Modified POA    * (Principal) ASHLEY (acute kidney injury) ([address] 8/30/2020 Yes    Hydroureteronephrosis 8/28/2020 Yes    Acute on chronic systolic heart failure ([address] 8/28/2020 Yes    Coronary artery disease involving coronary bypass graft of native heart without angina pectoris 8/28/2020 Yes    Benign prostatic hyperplasia with urinary obstruction 8/28/2020 Yes    Abdominal aortic aneurysm (AAA) without rupture ([address] 8/29/2020 Yes    Gross hematuria 8/29/2020 Yes    CKD (chronic kidney disease) stage 3, GFR 30-59 ml/min (HCC) (Chronic) 8/29/2020 Yes    Urinary obstruction 8/29/2020 Yes    Prostate cancer, primary, with metastasis from prostate to other site [facility]) 8/29/2020 Yes          Plan:      1. Status post posterior biopsy, discussed with patient's daughter and wife and patient, daughter was available over the phone, patient to follow-up outpatient for the pathology  2. PT and OT  3. Monitor hemoglobin  4. CBI clamped, urology following  5. Blood pressure is on the lower side, will increase the dose of midodrine, consider blood transfusion if hemoglobin drops down more  6. Nephrology following for ASHLEY and IV fluids per nephrology  7. On Rocephin  8. On Venofer  9. Add incentive spirometry  10. SCDs for DVT prophylaxis  11.  Outpatient follow-up with PCP for aneurysm         Adriane Arango MD  9/1/2020  1:25 PM

## 2020-09-01 NOTE — PLAN OF CARE
Problem: Falls - Risk of:  Goal: Will remain free from falls  Description: Will remain free from falls  Outcome: Ongoing     Problem: Falls - Risk of:  Goal: Absence of physical injury  Description: Absence of physical injury  Outcome: Ongoing     Problem: Fluid Volume:  Goal: Hemodynamic stability will improve  Description: Hemodynamic stability will improve  Outcome: Ongoing     Problem: Fluid Volume:  Goal: Ability to maintain a balanced intake and output will improve  Description: Ability to maintain a balanced intake and output will improve  Outcome: Ongoing     Problem: Health Behavior:  Goal: Identification of resources available to assist in meeting health care needs will improve  Description: Identification of resources available to assist in meeting health care needs will improve  Outcome: Ongoing     Problem: Respiratory:  Goal: Respiratory status will improve  Description: Respiratory status will improve  Outcome: Ongoing     Problem: OXYGENATION/RESPIRATORY FUNCTION  Goal: Patient will maintain patent airway  Outcome: Ongoing     Problem: OXYGENATION/RESPIRATORY FUNCTION  Goal: Patient will achieve/maintain normal respiratory rate/effort  Description: Respiratory rate and effort will be within normal limits for the patient  Outcome: Ongoing     Problem: HEMODYNAMIC STATUS  Goal: Patient has stable vital signs and fluid balance  Outcome: Ongoing     Problem: FLUID AND ELECTROLYTE IMBALANCE  Goal: Fluid and electrolyte balance are achieved/maintained  Outcome: Ongoing     Problem: ACTIVITY INTOLERANCE/IMPAIRED MOBILITY  Goal: Mobility/activity is maintained at optimum level for patient  Outcome: Ongoing     Problem: Skin Integrity:  Goal: Will show no infection signs and symptoms  Description: Will show no infection signs and symptoms  Outcome: Ongoing     Problem: Skin Integrity:  Goal: Absence of new skin breakdown  Description: Absence of new skin breakdown  Outcome: Ongoing     Problem: Urinary Elimination:  Goal: Signs and symptoms of infection will decrease  Description: Signs and symptoms of infection will decrease  Outcome: Ongoing     Problem: Urinary Elimination:  Goal: Complications related to the disease process, condition or treatment will be avoided or minimized  Description: Complications related to the disease process, condition or treatment will be avoided or minimized  Outcome: Ongoing

## 2020-09-01 NOTE — PROGRESS NOTES
Conjunctiva was pink  Neck: supple  Pulmonary: Bilateral air entry and clear to auscultation no crackles or wheezes. Cardiovascular: S1 and S2 audible no S3 or murmur  Abdomen: Soft and nontender bowel sounds are positive   extremities:no cyanosis, no clubbing, +trace edema    Labs      Recent Labs     08/30/20  0846 08/31/20  0924 09/01/20  0658   WBC 8.4 11.5* 10.3   RBC 2.93* 2.83* 2.62*   HGB 8.5* 8.2* 7.7*   HCT 28.2* 27.1* 25.3*   MCV 96.2 95.8 96.6   MCH 29.0 29.0 29.4   MCHC 30.1 30.3 30.4   RDW 13.7 13.6 13.6   * 128* 132*   MPV 9.8 9.9 10.8      BMP:   Recent Labs     08/31/20  0829 08/31/20  0924 09/01/20  0658    142 141   K 3.0* 3.2* 3.4*    103 105   CO2 25 25 25   BUN 40* 40* 33*   CREATININE 2.44* 2.30* 1.95*   GLUCOSE 107* 109* 112*   CALCIUM 7.0* 7.0* 7.1*      Phosphorus:     Recent Labs     08/31/20  0829 08/31/20  0924 09/01/20  0658   PHOS 3.1 3.1 2.9     Magnesium:    Recent Labs     08/30/20  0629 08/31/20  0924   MG 2.0 1.8     Albumin:    Recent Labs     08/31/20  0829 08/31/20  0924 09/01/20  0658   LABALBU 3.1* 3.3* 2.9*     PTH:     Lab Results   Component Value Date    IPTH 145.3 08/30/2020     Urinalysis/Chemistries      Lab Results   Component Value Date    NITRU POSITIVE 08/29/2020    COLORU RED 08/29/2020    PHUR 7.5 08/29/2020    WBCUA 0 TO 2 08/29/2020    RBCUA TOO NUMEROUS TO COUNT 08/29/2020    MUCUS NOT REPORTED 08/29/2020    TRICHOMONAS NOT REPORTED 08/29/2020    YEAST NOT REPORTED 08/29/2020    BACTERIA NOT REPORTED 08/29/2020    SPECGRAV 1.015 08/29/2020    LEUKOCYTESUR SMALL 08/29/2020    UROBILINOGEN ELEVATED 08/29/2020    BILIRUBINUR NEGATIVE 08/29/2020    GLUCOSEU NEGATIVE 08/29/2020    KETUA TRACE 08/29/2020    AMORPHOUS NOT REPORTED 08/29/2020       Radiology    Reviewed as available    Assessment    1. Acute kidney injury due to obstructive uropathy. Renal functions are improving after Panchal catheter placement  2.    Underlying CKD most likely due to chronic obstruction versus nephrosclerosis. 3.   Markedly elevated PSA and possible metastatic prostate cancer  4. Gross hematuria -On bladder irrigation  5. Nephrotic range proteinuria. Will repeat and creatinine once hematuria resolved    Plan   1. Continue IV fluid for now at 50 mL/h  2. Continue Venofer  3. CBC and BMP in a.m. Discussed with her daughter was available over the phone. Nutrition   Renal Diet/TF  This note is created with the assistance of a speech-recognition program. While intending to generate a document that actually reflects the content of the visit, no guarantees can be provided that every mistake has been identified and corrected by editing  Electronically signed by Yuniel Berman MD on 9/1/2020 at 10:33 AM    Thank you. Please call with any questions. Electronically signed by Yuniel Berman MD on 9/1/2020 at 10:21 AM   [facility].

## 2020-09-01 NOTE — CARE COORDINATION
Patient/family seen: Yes       Informed patient/family of [facility] Medical Bundle Program with potential outreach by either Care Transitions Team or [facility] Team based on hospital admission and location.        [facility]-A Notification Letter given: Yes         Current discharge plan:   Home w/ [facility].

## 2020-09-01 NOTE — PROGRESS NOTES
Physical Therapy    Facility/Department: [facility] RENAL//MED SURG  Initial Assessment    NAME: Patsy Ahumada  : 1933  MRN: [id number]    Date of Service: 2020   Pt admitted to the hospital for the management of acute renal failure superimposed on chronic kidney disease stage II with Obstructive uropathy with hydroureteronephrosis, and exacerbation of systolic heart failure.  Patient presented to [facility] for the complaint of increased shortness of breath and swelling of the lower extremities after being called by his provider stating he needed to be seen in the emergency room for the elevated BUN and creatinine on repeat labs after increasing Lasix and potassium secondary to fluid overload acute exacerbation of heart failure in the outpatient setting .  Patient states the swelling of the lower extremities  has been ongoing for approximately the past month. He apparently had a fall 2 weeks ago in the grass which he does not describe as syncopal which he states is \"his legs just got weak\", after discussion with his family and encouragement he then decided to seek treatment in the emergency room 2 days after the fall.  Patient's bone scan is consistent with metastatic disease in the spine and posterior pelvis. Discharge Recommendations: Further therapy recommended at discharge to address mild balance and gait deficits. PT Equipment Recommendations  Equipment Needed: No(pt has RW and SPC)    Assessment   Body structures, Functions, Activity limitations: Decreased functional mobility ; Decreased balance;Decreased endurance  Assessment: The pt performed bed mobility with Min A and ambulated 150ft with RW and CGA. Recommend continued physical therapy to progress toward prior level of independence. Prognosis: Good  Decision Making: Medium Complexity  PT Education: Goals;PT Role;Plan of Care; Functional Mobility Training  REQUIRES PT FOLLOW UP: Yes  Activity Tolerance  Activity Tolerance: Patient limited by endurance       Patient Diagnosis(es): There were no encounter diagnoses. has a past medical history of AAA (abdominal aortic aneurysm) ([facility] [address] BPH (benign prostatic hyperplasia), CAD (coronary artery disease), GIB (gastrointestinal bleeding), Hyperlipidemia, Skin cancer, basal cell, Stroke (cerebrum) ([facility] [address] and Systolic heart failure ([facility] [address] has a past surgical history that includes Coronary artery bypass graft; Vocal Cord Surgery; Skin cancer excision; Eye surgery; Prostate biopsy (08/31/2020); and Prostate biopsy (N/A, 8/31/2020). Restrictions  Restrictions/Precautions  Restrictions/Precautions: General Precautions, Fall Risk  Position Activity Restriction  Other position/activity restrictions: up with assist, CBI  Vision/Hearing  Vision: Impaired  Vision Exceptions: Wears glasses for distance  Hearing: Exceptions to [facility]  Hearing Exceptions: Hard of hearing/hearing concerns     Subjective  General  Patient assessed for rehabilitation services?: Yes  Response To Previous Treatment: Not applicable  Family / Caregiver Present: Yes(pt's spouse)  Follows Commands: Within Functional Limits  Subjective  Subjective: RN and pt agreeable to PT. Pt supine in bed upon arrival, very pleasant and cooperative throughout.   Pain Screening  Patient Currently in Pain: Denies  Vital Signs  Patient Currently in Pain: Denies       Orientation  Orientation  Overall Orientation Status: Within Functional Limits  Social/Functional History  Social/Functional History  Lives With: Spouse  Type of Home: House  Home Layout: One level  Home Access: Stairs to enter without rails  Entrance Stairs - Number of Steps: 1  Bathroom Shower/Tub: Walk-in shower  Bathroom Toilet: Standard  Bathroom Equipment: Shower chair  Home Equipment: [employer], Rolling walker(Pt ambulates without AD at baseline)  Receives Help From: Family  ADL Assistance: [address]: Needs assistance  Homemaking Responsibilities: Yes(wife Training, Patient/Caregiver Education & Training  Safety Devices  Type of devices: Nurse notified, Call light within reach, Gait belt, Left in chair  Restraints  Initially in place: No    AM-PAC Score  AM-PAC Inpatient Mobility Raw Score : 18 (09/01/20 1516)  AM-PAC Inpatient T-Scale Score : 43.63 (09/01/20 1516)  Mobility Inpatient CMS 0-100% Score: 46.58 (09/01/20 1516)  Mobility Inpatient CMS G-Code Modifier : CK (09/01/20 1516)          Goals  Short term goals  Time Frame for Short term goals: 14 visits  Short term goal 1: Perform bed mobility and functional transfers independently  Short term goal 2: Ambulate 300ft with least restricitve AD and SBA  Short term goal 3: Demo Good- dynamic standing balance to decrease risk of falls  Short term goal 4: Participate in 30 minutes of therapy to demo increased endurance       Therapy Time   Individual Concurrent Group Co-treatment   Time In 1325         Time Out 1350         Minutes 25         Timed Code Treatment Minutes: 8 Minutes       Bing Santana PT

## 2020-09-01 NOTE — PROGRESS NOTES
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 Shortness of breath    SUBJECTIVE:    Patient seen and examined  He had prostate biopsy   Results awaited  NO fever chills  HISTORY OF PRESENT ILLNESS:    This is a 51-year-old male who was admitted from [place] for complaints of worsening shortness of breath and swelling in lower extremities. Patient has history of CHF and been following with cardiologist.  Patient was also noted to have acute kidney injury on top of chronic kidney disease and being managed for acute exacerbation of systolic heart failure. Patient was noted to have high PSA of 97.6 and also sclerotic lesion in L1 vertebral body and right iliac bone suspicious for metastatic disease. Oncology consult for further opinion. Past Medical History:   has a past medical history of AAA (abdominal aortic aneurysm) ([address] BPH (benign prostatic hyperplasia), CAD (coronary artery disease), GIB (gastrointestinal bleeding), Hyperlipidemia, Skin cancer, basal cell, Stroke (cerebrum) ([address] and Systolic heart failure ([address] Past Surgical History:   has a past surgical history that includes Coronary artery bypass graft; Vocal Cord Surgery; Skin cancer excision; Eye surgery; Prostate biopsy (08/31/2020); and Prostate biopsy (N/A, 8/31/2020). Medications:    Prior to Admission medications    Medication Sig Start Date End Date Taking?  Authorizing Provider   furosemide (LASIX) 40 MG tablet Take 40 mg by mouth 2 times daily 8/11/20  Yes Historical Provider, MD   tamsulosin (FLOMAX) 0.4 MG capsule Take 0.4 mg by mouth daily   Yes Historical Provider, MD   carvedilol (COREG) 25 MG tablet Take 25 mg by mouth 2 times daily Yes Historical Provider, MD   sacubitril-valsartan (ENTRESTO) 24-26 MG per tablet Take 1 tablet by mouth 2 times daily   Yes Historical Provider, MD   sacubitril-valsartan (ENTRESTO)  MG per tablet Take 1 tablet by mouth 2 times daily   Yes Historical Provider, MD   linaclotide (LINZESS) 145 MCG capsule Take 145 mcg by mouth every morning (before breakfast)   Yes Historical Provider, MD   rosuvastatin (CRESTOR) 10 MG tablet Take 10 mg by mouth daily   Yes Historical Provider, MD   aspirin 81 MG EC tablet Take 81 mg by mouth daily   Yes Historical Provider, MD   potassium chloride (KLOR-CON) 10 MEQ extended release tablet Take 20 mEq by mouth daily 8/11/20   Historical Provider, MD   aspirin 81 MG chewable tablet Baby Aspirin 81 mg chewable tablet   Chew 1 tablet every day by oral route.     Historical Provider, MD   linaclotide [facility]) 145 MCG capsule Linzess 145 mcg capsule    Historical Provider, MD     Current Facility-Administered Medications   Medication Dose Route Frequency Provider Last Rate Last Dose    midodrine (PROAMATINE) tablet 10 mg  10 mg Oral TID  Jagruti Shaw MD        potassium chloride (KLOR-CON M) extended release tablet 40 mEq  40 mEq Oral PRN Cielo Ho MD   40 mEq at 09/01/20 0689    Or    potassium bicarb-citric acid (EFFER-K) effervescent tablet 40 mEq  40 mEq Oral PRN Rahil Claude Senate, MD        Or    potassium chloride 10 mEq/100 mL IVPB (Peripheral Line)  10 mEq Intravenous PRN Cielo Ho MD        iron sucrose (VENOFER) 200 mg in sodium chloride 0.9 % 100 mL IVPB  200 mg Intravenous Q24H Dhruv Borja MD   Stopped at 09/01/20 0815    0.45 % sodium chloride infusion   Intravenous Continuous Dhruv Borja MD 50 mL/hr at 08/31/20 0520      hyoscyamine (LEVSIN/SL) sublingual tablet 125 mcg  125 mcg Sublingual Q6H PRN Dhruv Borja MD        cefTRIAXone (ROCEPHIN) 1 g IVPB in 50 mL D5W minibag  1 g Intravenous Q24H Dhruv Borja MD   Stopped at 08/31/20 1750  [Held by provider] carvedilol (COREG) tablet 25 mg  25 mg Oral BID Kristy Angeles MD        tamsulosin (FLOMAX) capsule 0.4 mg  0.4 mg Oral Daily Kristy Angeles MD   0.4 mg at 09/01/20 0928    [Held by provider] aspirin EC tablet 81 mg  81 mg Oral Daily Kristy Angeles MD        lidocaine (XYLOCAINE) 2 % uro-jet   Topical PRN Kristy Angeles MD        sodium chloride 0.9 % irrigation 3,000 mL  3,000 mL Irrigation Continuous Kristy Angeles MD   3,000 mL at 08/29/20 1436    morphine (PF) injection 2 mg  2 mg Intravenous Q4H PRN Kristy Angeles MD        0.9 % sodium chloride bolus  1,000 mL Intravenous Once Kristy Angeles MD   Stopped at 08/29/20 2046    sodium chloride flush 0.9 % injection 10 mL  10 mL Intravenous 2 times per day Kristy Angeles MD   10 mL at 08/29/20 0846    sodium chloride flush 0.9 % injection 10 mL  10 mL Intravenous PRN Kristy Angeles MD        acetaminophen (TYLENOL) tablet 650 mg  650 mg Oral Q6H PRN Kristy Angeles MD   650 mg at 08/30/20 1655    Or    acetaminophen (TYLENOL) suppository 650 mg  650 mg Rectal Q6H PRN Kristy Angeles MD        polyethylene glycol (GLYCOLAX) packet 17 g  17 g Oral Daily PRN Kristy Angeles MD   17 g at 08/30/20 1334    promethazine (PHENERGAN) tablet 12.5 mg  12.5 mg Oral Q6H PRN Kristy Angeles MD        Or    ondansetron (ZOFRAN) injection 4 mg  4 mg Intravenous Q6H PRN Kristy Angeles MD        nicotine (NICODERM CQ) 21 MG/24HR 1 patch  1 patch Transdermal Daily PRN Kristy Angeles MD           Allergies:  Patient has no known allergies. Social History:   reports that he quit smoking about 54 years ago. His smoking use included cigarettes. He quit after 15.00 years of use. He has never used smokeless tobacco. He reports current alcohol use. He reports that he does not use drugs. Family History: family history includes Heart Disease in his brother, father, and mother; Hypertension in his mother.     REVIEW OF focal findings or movement disorder noted   Musculoskeletal - no joint tenderness, deformity or swelling   Extremities - peripheral pulses normal, no pedal edema, no clubbing or cyanosis   Skin - normal coloration and turgor, no rashes, no suspicious skin lesions noted ,    DATA:    Labs:   CBC:   Recent Labs     08/31/20 0924 09/01/20  0658   WBC 11.5* 10.3   HGB 8.2* 7.7*   HCT 27.1* 25.3*   * 132*     BMP:   Recent Labs     08/31/20 0924 09/01/20  0658    141   K 3.2* 3.4*   CO2 25 25   BUN 40* 33*   CREATININE 2.30* 1.95*   LABGLOM 27* 33*   GLUCOSE 109* 112*     PT/INR: No results for input(s): PROTIME, INR in the last 72 hours. IMAGING DATA:  @IMG@   CT CHEST ABDOMEN PELVIS WO CONTRAST   Final Result   Chest:      1. Osteopenia with superimposed diffuse degenerative changes could obscure   subtle metastatic disease. 2. Subtle sclerotic foci in T1 and T2 vertebral bodies likely metastatic. 3. No metastatic nodules. 4. Small bilateral pleural effusions right greater than left similar to prior. Abdomen pelvis:      1. Interval catheterization of the urinary bladder. Thickened bladder wall   with new marked perivesical stranding. Suspect cystitis. 2. Mild bilateral hydronephrosis slightly improved from prior but bilateral   hydroureter is unchanged. 3. A 4.4 cm infrarenal abdominal aortic aneurysm. Recommend follow-up every 12 months and vascular consultation. Reference: J Am David Radiol 3770;76:113-408. US GUIDED NEEDLE PLACEMENT   Final Result      NM BONE SCAN WHOLE BODY   Final Result   Findings consistent with metastatic disease within the spine and posterior   pelvis. Question small lesions within the ribs. US RETROPERITONEAL LIMITED   Final Result   1. Evaluation is limited due to patient body habitus as well as bowel gas and   rib shadowing. 2. There appears to be moderate right and mild left-sided hydronephrosis.    3. Otherwise, no gross sonographic abnormality seen of the kidneys. Primary Problem  ASHLEY (acute kidney injury) [facility])    Active Hospital Problems    Diagnosis Date Noted    Gross hematuria [R31.0] 08/29/2020    CKD (chronic kidney disease) stage 3, GFR 30-59 ml/min (HCC) [N18.3] 08/29/2020    Urinary obstruction [N13.9] 08/29/2020    Prostate cancer, primary, with metastasis from prostate to other site [facility]) Ellen Brian 08/29/2020    ASHLEY (acute kidney injury) ([facility] [address] [N17.9]     Hydroureteronephrosis [N13.30] 08/28/2020    Acute on chronic systolic heart failure (HCC) [I50.23] 08/28/2020    Coronary artery disease involving coronary bypass graft of native heart without angina pectoris [I25.810] 08/28/2020    Benign prostatic hyperplasia with urinary obstruction [N40.1, N13.8] 08/28/2020    Abdominal aortic aneurysm (AAA) without rupture ([facility] [address] [I71.4] 07/28/2020         IMPRESSION:   1. Elevated PSA with bony lesions suspicious for prostate cancer  2. Obstructive uropathy with bilateral ureteral hydronephrosis  3. CHF  4. ASHLEY/CKD  5. Bone lesions  6. Anemia  7. Mild thrombocytopenia    RECOMMENDATIONS:  1. I reviewed the laboratory data, imaging studies, diagnosis, prognosis and treatment options with patient  2. Patient's bone scan is consistent with metastatic disease in the spine and posterior pelvis  3. Getting IV iron  4. I had discussion with patient and his daughter Lauren Lilly over the telephone. I explained that prostate cancer is slow-growing and even with metastatic disease can be controlled with androgen depression therapy which is much tolerable  5. Will need tissue diagnosis and he had prostate biopsy  6. We can start patient on casodex  7. Patient and family agreeable  8. We will follow      Discussed with patient and Nurse. Thank you for asking us to see this patient. Mykel Brown MD  Hematologist/Medical Oncologist    Cell: [phone]      This note is created with the assistance of a speech recognition program. While intending to generate a document that actually reflects the content of the visit, the document can still have some errors including those of syntax and sound a like substitutions which may escape proof reading. It such instances, actual meaning can be extrapolated by contextual diversion.

## 2020-09-01 NOTE — DISCHARGE INSTR - COC
Continuity of Care Form    Patient Name: Evin Madera   :  1933  MRN:  [id number]    Admit date:  2020  Discharge date:  20    Code Status Order: Full Code   Advance Directives:   Advance Care Flowsheet Documentation     Date/Time Healthcare Directive Type of Healthcare Directive Copy in [address] Agent's Name Healthcare Agent's Phone Number    20 1026  Yes, patient has an advance directive for healthcare treatment  --  No, copy requested from family  --  --  --    20  Yes, patient has an advance directive for healthcare treatment  Living will;Durable power of  for health care  No, copy requested from family  Spouse  Russell Gordillo  --          Admitting Physician:  Neeta Ayala MD  PCP: BAM Larkin CNP    Discharging Nurse: [facility] Unit/Room#: 4706/1207-64  Discharging Unit Phone Number: [phone]    Emergency Contact:   Extended Emergency Contact Information  Primary Emergency Contact: Bharat Reusing  Home Phone: [phone]  Relation: Spouse  Secondary Emergency Contact: misti llamas  Home Phone: [phone]  Relation: Child    Past Surgical History:  Past Surgical History:   Procedure Laterality Date    CORONARY ARTERY BYPASS GRAFT      cabg x 4 in  in [address]  2020    PROSTATE BIOPSY N/A 2020    ** ADD ON - PROSTATE BIOPSY WITH ULTRASOUND ([address] NOTIFIED DEPT) performed by Twyla Granado MD at [address]      Biopsy       Immunization History: There is no immunization history on file for this patient.     Active Problems:  Patient Active Problem List   Diagnosis Code    Acute renal failure (HCC) N17.9    Hydroureteronephrosis N13.30    Hyperkalemia E87.5    Acute on chronic systolic heart failure (HCC) I50.23    Coronary artery disease involving coronary bypass graft of native heart without [address] filed at 9/1/2020 0646  Gross per 24 hour   Intake 3688 ml   Output -1400 ml   Net 5088 ml     I/O last 3 completed shifts: In: 9224 [P.O.:222; I.V.:3466]  Out: -1400     Safety Concerns: At Risk for Falls    Impairments/Disabilities:      None    Nutrition Therapy:  Current Nutrition Therapy:   - Oral Diet:  General    Routes of Feeding: Oral  Liquids: No Restrictions  Daily Fluid Restriction: no  Last Modified Barium Swallow with Video (Video Swallowing Test): not done    Treatments at the Time of Hospital Discharge:   Respiratory Treatments: n/a  Oxygen Therapy:  is not on home oxygen therapy.   Ventilator:    - No ventilator support    Rehab Therapies: Physical Therapy and Occupational Therapy  Weight Bearing Status/Restrictions: No weight bearing restirctions  Other Medical Equipment (for information only, NOT a DME order):  walker  Other Treatments: payne care BID    Patient's personal belongings (please select all that are sent with patient):  None    RN SIGNATURE:  Electronically signed by Mabel Yuen RN on 9/2/20 at 2:09 PM EDT    CASE MANAGEMENT/SOCIAL WORK SECTION    Inpatient Status Date: 08/28/2020    Readmission Risk Assessment Score:  Readmission Risk              Risk of Unplanned Readmission:        26           Discharging to Facility/ Agency   · Name: [address] [place] [address]         Phone: [phone]       Fax: [phone]        ·     Dialysis Facility (if applicable)   · Name:  · Address:  · Dialysis Schedule:  · Phone:  · Fax:    / signature: Electronically signed by Essie Garay RN on 9/1/20 at 5:52 PM EDT    PHYSICIAN SECTION    Prognosis: Fair    Condition at Discharge: Stable    Rehab Potential (if transferring to Rehab): Good    Recommended Labs or Other Treatments After Discharge: PT/OT evaluate and treat  Hemoglobin check on 9/3/2020 and 9/6/2020 and 9/13/2020, results to [facility]  Basic metabolic panel on 7/89/1500, results to Dr Jose M Garzon with urology Dr. Laura Robles vitals monitor for any bleeding    Physician Certification: I certify the above information and transfer of Dorothea Orourke  is necessary for the continuing treatment of the diagnosis listed and that he requires [address] for greater 30 days.      Update Admission H&P: Changes in H&P as follows - See discharge summary    PHYSICIAN SIGNATURE:  Electronically signed by Macy Chapa MD on 9/2/20 at 3:34 PM EDT

## 2020-09-01 NOTE — PROGRESS NOTES
Occupational Therapy   Occupational Therapy Initial Assessment  Date: 2020   Patient Name: George Haskins  MRN: [id number]     : 1933    Date of Service: 2020    Discharge Recommendations:    Further therapy recommended at discharge. Assessment   Performance deficits / Impairments: Decreased functional mobility ; Decreased ADL status; Decreased endurance;Decreased balance;Decreased safe awareness;Decreased high-level IADLs;Decreased ROM  Treatment Diagnosis: acute kidney injury  Prognosis: Good  Decision Making: Medium Complexity  Patient Education: pt was edu on purpose of eval, POC, purpose of gait belt, proper walker placement, hand placement for transfers, with good return  REQUIRES OT FOLLOW UP: Yes  Activity Tolerance  Activity Tolerance: Patient Tolerated treatment well;Patient limited by fatigue  Safety Devices  Safety Devices in place: Yes  Type of devices: Call light within reach;Gait belt;Patient at risk for falls; Left in chair  Restraints  Initially in place: No         Patient Diagnosis(es): There were no encounter diagnoses. has a past medical history of AAA (abdominal aortic aneurysm) ([facility] BPH (benign prostatic hyperplasia), CAD (coronary artery disease), GIB (gastrointestinal bleeding), Hyperlipidemia, Skin cancer, basal cell, Stroke (cerebrum) ([address] and Systolic heart failure ([facility] has a past surgical history that includes Coronary artery bypass graft; Vocal Cord Surgery; Skin cancer excision; Eye surgery; Prostate biopsy (2020); and Prostate biopsy (N/A, 2020).     Treatment Diagnosis: acute kidney injury      Restrictions  Restrictions/Precautions  Restrictions/Precautions: General Precautions, Fall Risk  Position Activity Restriction  Other position/activity restrictions: up with assist, CBI    Subjective   General  Patient assessed for rehabilitation services?: Yes  Family / Caregiver Present: No  Diagnosis: acute kidney injury  General Comment  Comments: RN ok'd for therapy this afternoon. pt was agreeable, cooperative and pleasant throughout session. Patient Currently in Pain: Denies  Oxygen Therapy  O2 Device: None (Room air)     Social/Functional History  Social/Functional History  Lives With: Spouse  Type of Home: House  Home Layout: One level  Home Access: Stairs to enter without rails  Entrance Stairs - Number of Steps: 1  Bathroom Shower/Tub: Walk-in shower  Bathroom Toilet: Standard  Bathroom Equipment: Shower chair  Home Equipment: [address], Rolling walker(Pt ambulates without AD at baseline)  Receives Help From: Family  ADL Assistance: [address]: Needs assistance  Homemaking Responsibilities: Yes(wife completes most)  Ambulation Assistance: Independent  Transfer Assistance: Independent  Active : Yes  Mode of Transportation: Car  Occupation: Retired  Type of occupation: Engineering  Leisure & Hobbies: Enjoys spending time with family  Additional Comments: Pt reports recently moving to [place] from [place] to be closer to family. Pt's wife is also retired and able to assist prn. Objective   Vision: Impaired  Vision Exceptions: Wears glasses for distance  Hearing: Within functional limits    Orientation  Overall Orientation Status: Within Functional Limits     Balance  Sitting Balance: Stand by assistance(~12 min while sitting EOB and in chair)  Standing Balance: Contact guard assistance(with RW)  Standing Balance  Time: ~8 min  Activity: pt completed functional mobility with PT in [place] and in room from bed>door>chair  Comment: pt had 1 LOB but was able to self correct. pt reported no dizziness with standing or sitting. pt required 2 VC for proper RW positioning. ADL  Feeding: Independent  Grooming: Supervision; Increased time to complete;Setup  UE Bathing: Increased time to complete;Setup;Contact guard assistance  LE Bathing: Increased time to complete;Setup;Minimal assistance  UE Dressing: Increased time to complete;Contact guard assistance;Verbal cueing;Setup(pt doffed/donned gown while seated in chair with set up, VC for sequencing and initiation and assistance for line mngmt)  LE Dressing: Increased time to complete;Setup;Minimal assistance  Toileting: Increased time to complete;Minimal assistance     Tone RUE  RUE Tone: Normotonic  Tone LUE  LUE Tone: Normotonic  Coordination  Movements Are Fluid And Coordinated: Yes     Bed mobility  Supine to Sit: Minimal assistance(for trunk control)  Sit to Supine: (pt retired to sitting in chair)  Scooting: Stand by assistance  Comment: HOB elevated  Transfers  Sit to stand: Contact guard assistance  Stand to sit: Contact guard assistance  Transfer Comments: with RW     Cognition  Overall Cognitive Status: WFL  Safety Judgement: Decreased awareness of need for assistance;Decreased awareness of need for safety  Problem Solving: Assistance required to generate solutions  Insights: Decreased awareness of deficits  Initiation: Requires cues for some  Sequencing: Requires cues for some     Sensation  Overall Sensation Status: WFL      LUE AROM (degrees)  LUE AROM : Exceptions  L Shoulder Flexion 0-180: 0-100  L Elbow Flexion 0-145: WFL  Left Hand AROM (degrees)  Left Hand AROM: WFL  RUE AROM (degrees)  RUE AROM : Exceptions  R Shoulder Flexion 0-180: 0-90  R Elbow Flexion 0-145: WFL  Right Hand AROM (degrees)  Right Hand AROM: WFL  LUE Strength  Gross LUE Strength: WFL  L Hand General: 5/5  RUE Strength  Gross RUE Strength: WFL  R Hand General: 5/5      Plan   Plan  Times per week: 2-3x/wk    AM-PAC Score  AM-[facility] Inpatient Daily Activity Raw Score: 19 (09/01/20 1412)  AM-PAC Inpatient ADL T-Scale Score : 40.22 (09/01/20 1412)  ADL Inpatient CMS 0-100% Score: 42.8 (09/01/20 1412)  ADL Inpatient CMS G-Code Modifier : CK (09/01/20 1412)    Goals  Short term goals  Time Frame for Short term goals: pt will, by d/c  Short term goal 1: complete UB ADLs and grooming with mod I  Short term goal 2: complete LB ADLs and toileting with Supervision and AE PRN  Short term goal 3: increase activity tolerance to 25+ min during functional task  to increase ADL participation  Short term goal 4: demo supervision with functional mobility/transfer using LRD PRN  Short term goal 5: complete dynamic standing balance for 10+ min with SBA and LRD PRN to increase ease with ADLs  Short term goal 6: independently demo good safety awareness during functional activity       Therapy Time   Individual Concurrent Group Co-treatment   Time In 1325         Time Out 1350         Minutes 25      co-eval with PT   Timed Code Treatment Minutes: 8 Minutes       Ana Willis S/OT

## 2020-09-02 VITALS
OXYGEN SATURATION: 94 % | DIASTOLIC BLOOD PRESSURE: 45 MMHG | BODY MASS INDEX: 33.27 KG/M2 | HEIGHT: 67 IN | RESPIRATION RATE: 18 BRPM | SYSTOLIC BLOOD PRESSURE: 107 MMHG | WEIGHT: 212 LBS | TEMPERATURE: 98 F | HEART RATE: 68 BPM

## 2020-09-02 PROBLEM — I50.23 ACUTE ON CHRONIC SYSTOLIC HEART FAILURE (HCC): Status: RESOLVED | Noted: 2020-08-28 | Resolved: 2020-09-02

## 2020-09-02 LAB
ABSOLUTE EOS #: 0.25 K/UL (ref 0–0.44)
ABSOLUTE IMMATURE GRANULOCYTE: 0.08 K/UL (ref 0–0.3)
ABSOLUTE LYMPH #: 0.5 K/UL (ref 1.1–3.7)
ABSOLUTE MONO #: 0.66 K/UL (ref 0.1–1.2)
ABSOLUTE RETIC #: 0.02 M/UL (ref 0.03–0.08)
ALBUMIN SERPL-MCNC: 2.8 G/DL (ref 3.5–5.2)
ANION GAP SERPL CALCULATED.3IONS-SCNC: 9 MMOL/L (ref 9–17)
BASOPHILS # BLD: 0 % (ref 0–2)
BASOPHILS ABSOLUTE: 0 K/UL (ref 0–0.2)
BUN BLDV-MCNC: 27 MG/DL (ref 8–23)
BUN/CREAT BLD: ABNORMAL (ref 9–20)
CALCIUM SERPL-MCNC: 7 MG/DL (ref 8.6–10.4)
CHLORIDE BLD-SCNC: 105 MMOL/L (ref 98–107)
CO2: 24 MMOL/L (ref 20–31)
CORTISOL COLLECTION INFO: NORMAL
CORTISOL: 17.2 UG/DL (ref 2.7–18.4)
CREAT SERPL-MCNC: 1.5 MG/DL (ref 0.7–1.2)
DIFFERENTIAL TYPE: ABNORMAL
EOSINOPHILS RELATIVE PERCENT: 3 % (ref 1–4)
FERRITIN: 972 UG/L (ref 30–400)
FOLATE: 18 NG/ML
GFR AFRICAN AMERICAN: 54 ML/MIN
GFR NON-AFRICAN AMERICAN: 44 ML/MIN
GFR SERPL CREATININE-BSD FRML MDRD: ABNORMAL ML/MIN/{1.73_M2}
GFR SERPL CREATININE-BSD FRML MDRD: ABNORMAL ML/MIN/{1.73_M2}
GLUCOSE BLD-MCNC: 103 MG/DL (ref 70–99)
HCT VFR BLD CALC: 26 % (ref 40.7–50.3)
HCT VFR BLD CALC: 26.5 % (ref 40.7–50.3)
HEMOGLOBIN: 7.6 G/DL (ref 13–17)
HEMOGLOBIN: 7.9 G/DL (ref 13–17)
IMMATURE GRANULOCYTES: 1 %
IMMATURE RETIC FRACT: 16.8 % (ref 2.7–18.3)
LYMPHOCYTES # BLD: 6 % (ref 24–43)
MCH RBC QN AUTO: 28.6 PG (ref 25.2–33.5)
MCHC RBC AUTO-ENTMCNC: 29.2 G/DL (ref 28.4–34.8)
MCV RBC AUTO: 97.7 FL (ref 82.6–102.9)
MONOCYTES # BLD: 8 % (ref 3–12)
MORPHOLOGY: NORMAL
NRBC AUTOMATED: 0 PER 100 WBC
PDW BLD-RTO: 13.6 % (ref 11.8–14.4)
PHOSPHORUS: 2.3 MG/DL (ref 2.5–4.5)
PLATELET # BLD: 137 K/UL (ref 138–453)
PLATELET ESTIMATE: ABNORMAL
PMV BLD AUTO: 10.5 FL (ref 8.1–13.5)
POTASSIUM SERPL-SCNC: 3.7 MMOL/L (ref 3.7–5.3)
RBC # BLD: 2.66 M/UL (ref 4.21–5.77)
RBC # BLD: ABNORMAL 10*6/UL
RETIC %: 0.9 % (ref 0.5–1.9)
RETIC HEMOGLOBIN: 33.4 PG (ref 28.2–35.7)
SEG NEUTROPHILS: 82 % (ref 36–65)
SEGMENTED NEUTROPHILS ABSOLUTE COUNT: 6.81 K/UL (ref 1.5–8.1)
SODIUM BLD-SCNC: 138 MMOL/L (ref 135–144)
THYROXINE, FREE: 1.17 NG/DL (ref 0.93–1.7)
TSH SERPL DL<=0.05 MIU/L-ACNC: 5.27 MIU/L (ref 0.3–5)
VITAMIN B-12: 420 PG/ML (ref 232–1245)
VITAMIN D 25-HYDROXY: 23.3 NG/ML (ref 30–100)
WBC # BLD: 8.3 K/UL (ref 3.5–11.3)
WBC # BLD: ABNORMAL 10*3/UL

## 2020-09-02 PROCEDURE — 36415 COLL VENOUS BLD VENIPUNCTURE: CPT

## 2020-09-02 PROCEDURE — 85045 AUTOMATED RETICULOCYTE COUNT: CPT

## 2020-09-02 PROCEDURE — 82533 TOTAL CORTISOL: CPT

## 2020-09-02 PROCEDURE — 99232 SBSQ HOSP IP/OBS MODERATE 35: CPT | Performed by: INTERNAL MEDICINE

## 2020-09-02 PROCEDURE — 97110 THERAPEUTIC EXERCISES: CPT

## 2020-09-02 PROCEDURE — 82746 ASSAY OF FOLIC ACID SERUM: CPT

## 2020-09-02 PROCEDURE — 97116 GAIT TRAINING THERAPY: CPT

## 2020-09-02 PROCEDURE — 51700 IRRIGATION OF BLADDER: CPT

## 2020-09-02 PROCEDURE — 6370000000 HC RX 637 (ALT 250 FOR IP): Performed by: INTERNAL MEDICINE

## 2020-09-02 PROCEDURE — 2580000003 HC RX 258: Performed by: STUDENT IN AN ORGANIZED HEALTH CARE EDUCATION/TRAINING PROGRAM

## 2020-09-02 PROCEDURE — 84443 ASSAY THYROID STIM HORMONE: CPT

## 2020-09-02 PROCEDURE — 85014 HEMATOCRIT: CPT

## 2020-09-02 PROCEDURE — 6360000002 HC RX W HCPCS: Performed by: STUDENT IN AN ORGANIZED HEALTH CARE EDUCATION/TRAINING PROGRAM

## 2020-09-02 PROCEDURE — 85018 HEMOGLOBIN: CPT

## 2020-09-02 PROCEDURE — 84439 ASSAY OF FREE THYROXINE: CPT

## 2020-09-02 PROCEDURE — 80069 RENAL FUNCTION PANEL: CPT

## 2020-09-02 PROCEDURE — 99239 HOSP IP/OBS DSCHRG MGMT >30: CPT | Performed by: INTERNAL MEDICINE

## 2020-09-02 PROCEDURE — 6370000000 HC RX 637 (ALT 250 FOR IP): Performed by: STUDENT IN AN ORGANIZED HEALTH CARE EDUCATION/TRAINING PROGRAM

## 2020-09-02 PROCEDURE — 85025 COMPLETE CBC W/AUTO DIFF WBC: CPT

## 2020-09-02 PROCEDURE — 82607 VITAMIN B-12: CPT

## 2020-09-02 PROCEDURE — 82306 VITAMIN D 25 HYDROXY: CPT

## 2020-09-02 PROCEDURE — 82728 ASSAY OF FERRITIN: CPT

## 2020-09-02 RX ORDER — FUROSEMIDE 40 MG/1
40 TABLET ORAL DAILY
COMMUNITY
Start: 2020-09-02 | End: 2020-09-28

## 2020-09-02 RX ORDER — MIDODRINE HYDROCHLORIDE 10 MG/1
10 TABLET ORAL
Qty: 90 TABLET | Refills: 0 | Status: SHIPPED | OUTPATIENT
Start: 2020-09-02 | End: 2020-09-23 | Stop reason: ALTCHOICE

## 2020-09-02 RX ORDER — FERROUS SULFATE 325(65) MG
325 TABLET ORAL 2 TIMES DAILY
Qty: 60 TABLET | Refills: 2 | Status: SHIPPED | OUTPATIENT
Start: 2020-09-02 | End: 2020-01-01 | Stop reason: SDUPTHER

## 2020-09-02 RX ORDER — POTASSIUM CHLORIDE 750 MG/1
10 TABLET, FILM COATED, EXTENDED RELEASE ORAL DAILY
COMMUNITY
Start: 2020-09-02

## 2020-09-02 RX ORDER — METOPROLOL SUCCINATE 25 MG/1
25 TABLET, EXTENDED RELEASE ORAL DAILY
Qty: 30 TABLET | Refills: 3 | Status: SHIPPED | OUTPATIENT
Start: 2020-09-02

## 2020-09-02 RX ORDER — MELATONIN
2000 DAILY
Qty: 30 TABLET | Refills: 5 | Status: SHIPPED | OUTPATIENT
Start: 2020-09-02 | End: 2020-01-01 | Stop reason: SDUPTHER

## 2020-09-02 RX ORDER — CEPHALEXIN 500 MG/1
500 CAPSULE ORAL 2 TIMES DAILY
Qty: 4 CAPSULE | Refills: 0 | Status: SHIPPED | OUTPATIENT
Start: 2020-09-02 | End: 2020-09-04

## 2020-09-02 RX ADMIN — MIDODRINE HYDROCHLORIDE 10 MG: 5 TABLET ORAL at 12:12

## 2020-09-02 RX ADMIN — IRON SUCROSE 200 MG: 20 INJECTION, SOLUTION INTRAVENOUS at 05:15

## 2020-09-02 RX ADMIN — TAMSULOSIN HYDROCHLORIDE 0.4 MG: 0.4 CAPSULE ORAL at 08:49

## 2020-09-02 RX ADMIN — MIDODRINE HYDROCHLORIDE 10 MG: 5 TABLET ORAL at 08:49

## 2020-09-02 ASSESSMENT — PAIN SCALES - GENERAL
PAINLEVEL_OUTOF10: 0
PAINLEVEL_OUTOF10: 2

## 2020-09-02 NOTE — PROGRESS NOTES
NEPHROLOGY PROGRESS NOTE      SUBJECTIVE     CBI turned off. Overall feels fine. No chest pain shortness of breath. Appetite decent. Renal function continues to improve. OBJECTIVE     Vitals:    09/01/20 2330 09/02/20 0522 09/02/20 0524 09/02/20 0848   BP: (!) 100/52   (!) 112/44   Pulse: 71   64   Resp: 14   17   Temp: 98.5 °F (36.9 °C)   98 °F (36.7 °C)   TempSrc: Oral      SpO2: 96%   94%   Weight:  212 lb (96.2 kg) 212 lb (96.2 kg)    Height:         24HR INTAKE/OUTPUT:      Intake/Output Summary (Last 24 hours) at 9/2/2020 1141  Last data filed at 9/2/2020 0519  Gross per 24 hour   Intake 1921 ml   Output 1050 ml   Net 871 ml       General appearance:Awake, alert, in no acute distress  HEENT: PERRLA  Respiratory::vesicular breath sounds,no wheeze/crackles  Cardiovascular:S1 S2 normal,no gallop or organic murmur. Abdomen:Non tender/non distended. Bowel sounds present  Extremities: No Cyanosis or Clubbing,Lower extremity edema  Neurological:Alert and oriented. No abnormalities of mood, affect, memory, mentation, or behavior are noted      MEDICATIONS     Scheduled Meds:    midodrine  10 mg Oral TID WC    iron sucrose  200 mg Intravenous Q24H    cefTRIAXone (ROCEPHIN) IV  1 g Intravenous Q24H    [Held by provider] carvedilol  25 mg Oral BID    tamsulosin  0.4 mg Oral Daily    [Held by provider] aspirin  81 mg Oral Daily    sodium chloride  1,000 mL Intravenous Once    sodium chloride flush  10 mL Intravenous 2 times per day     Continuous Infusions:    sodium chloride 50 mL/hr at 09/01/20 1659    sodium chloride       PRN Meds:  potassium chloride **OR** potassium alternative oral replacement **OR** potassium chloride, hyoscyamine, lidocaine, morphine, sodium chloride flush, acetaminophen **OR** acetaminophen, polyethylene glycol, promethazine **OR** ondansetron, nicotine  Home Meds:                Medications Prior to Admission: furosemide (LASIX) 40 MG tablet, Take 40 mg by mouth 2 times daily  tamsulosin (FLOMAX) 0.4 MG capsule, Take 0.4 mg by mouth daily  carvedilol (COREG) 25 MG tablet, Take 25 mg by mouth 2 times daily  sacubitril-valsartan (ENTRESTO) 24-26 MG per tablet, Take 1 tablet by mouth 2 times daily  sacubitril-valsartan (ENTRESTO)  MG per tablet, Take 1 tablet by mouth 2 times daily  linaclotide (LINZESS) 145 MCG capsule, Take 145 mcg by mouth every morning (before breakfast)  rosuvastatin (CRESTOR) 10 MG tablet, Take 10 mg by mouth daily  aspirin 81 MG EC tablet, Take 81 mg by mouth daily  potassium chloride (KLOR-CON) 10 MEQ extended release tablet, Take 20 mEq by mouth daily  aspirin 81 MG chewable tablet, Baby Aspirin 81 mg chewable tablet  Chew 1 tablet every day by oral route. linaclotide (LINZESS) 145 MCG capsule, Linzess 145 mcg capsule    INVESTIGATIONS     Last 3 CMP:    Recent Labs     08/31/20  0924 09/01/20  0658 09/02/20  0725    141 138   K 3.2* 3.4* 3.7    105 105   CO2 25 25 24   BUN 40* 33* 27*   CREATININE 2.30* 1.95* 1.50*   CALCIUM 7.0* 7.1* 7.0*   LABALBU 3.3* 2.9* 2.8*       Last 3 CBC:  Recent Labs     08/31/20  0924 09/01/20  0658 09/01/20  1812 09/02/20  0725   WBC 11.5* 10.3  --  8.3   RBC 2.83* 2.62*  --  2.66*   HGB 8.2* 7.7* 8.6* 7.6*   HCT 27.1* 25.3* 28.3* 26.0*   MCV 95.8 96.6  --  97.7   MCH 29.0 29.4  --  28.6   MCHC 30.3 30.4  --  29.2   RDW 13.6 13.6  --  13.6   * 132*  --  137*   MPV 9.9 10.8  --  10.5       ASSESSMENT     1. Acute kidney injury nonoliguric secondary to obstructive uropathy -improving. Creatinine peaked at 7.6. Today 1.5. Baseline creatinine unknown. 2.  Status post prostate biopsy  3. Bilateral hydroureteronephrosis. After Panchal placement 2 L was drained. 4.  Anemia  5. CHF /CABG    PLAN     Discontinue fluids. Hold Entersto as BP low normal.Ok to resume Lasix once a day  Follow-up in [place] with us in 4 to 6 weeks    Please do not hesitate to call with questions    This note is created with the assistance of a speech-recognition program. While intending to generate a document that actually reflects the content of the visit, no guarantees can be provided that every mistake has been identified and corrected by editing    Ricardo Pierre MD, [facility] Jose Graham, Christa Barroso   9/2/2020 11:41 AM  [facility]

## 2020-09-02 NOTE — CARE COORDINATION
Juju with [facility] notified of patients discharge, [address] and home care order in

## 2020-09-02 NOTE — DISCHARGE SUMMARY
to the hospital because of acute renal failure on chronic kidney disease. Patient was having increased swelling in his lower extremities so he followed up with his cardiologist who increased his dose of Lasix and also increase his potassium. However he was weak they did some lab work and it was showing acute kidney injury. He was transferred to the hospital.  Over there CT scan was concerning for obstructive uropathy with hydroureteronephrosis. He was transferred over here. Patient underwent further evaluation. He underwent bone scan which was concerning for 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 Concern was for possible prostate cancer. He underwent prostate biopsy as well. Results are pending and he needs to follow-up with his urologist for the results of that. Patient's creatinine was improving. Nephrology evaluated the patient. The recommended resuming Lasix on discharge. Patient also had hematuria and needed CBI which improved. His hemoglobin was stable after that. Advised outpatient follow-up and close monitoring. Patient was also hypotensive which then improved with midodrine. His Coreg was changed to metoprolol and discussed with patient and his family that we will change his Coreg to metoprolol because of the hypotension and to hold it if his blood pressure is low. They understood. Patient also is to follow-up with heme-onc as an outpatient.       Significant therapeutic interventions: As above    Significant Diagnostic Studies:   Labs / Micro:  CBC:   Lab Results   Component Value Date    WBC 8.3 09/02/2020    RBC 2.66 09/02/2020    HGB 7.9 09/02/2020    HCT 26.5 09/02/2020    MCV 97.7 09/02/2020    MCH 28.6 09/02/2020    MCHC 29.2 09/02/2020    RDW 13.6 09/02/2020     09/02/2020     BMP:    Lab Results   Component Value Date    GLUCOSE 103 09/02/2020     09/02/2020    K 3.7 09/02/2020     09/02/2020    CO2 24 09/02/2020    ANIONGAP 9 09/02/2020 BUN 27 09/02/2020    CREATININE 1.50 09/02/2020    BUNCRER NOT REPORTED 09/02/2020    CALCIUM 7.0 09/02/2020    LABGLOM 44 09/02/2020    GFRAA 54 09/02/2020    GFR      09/02/2020    GFR NOT REPORTED 09/02/2020        Radiology:  Nm Bone Scan Whole Body    Result Date: 8/31/2020  Findings consistent with 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 Question small lesions within the ribs. Ct Chest Abdomen Pelvis Wo Contrast    Result Date: 8/31/2020  Chest: 1. Osteopenia with superimposed diffuse degenerative changes could obscure subtle metastatic disease. 2. Subtle sclerotic foci in T1 and T2 vertebral bodies likely metastatic. 3. No metastatic nodules. 4. Small bilateral pleural effusions right greater than left similar to prior. Abdomen pelvis: 1. Interval catheterization of the urinary bladder. Thickened bladder wall with new marked perivesical stranding. Suspect cystitis. 2. Mild bilateral hydronephrosis slightly improved from prior but bilateral hydroureter is unchanged. 3. A 4.4 cm infrarenal abdominal aortic aneurysm. Recommend follow-up every 12 months and vascular consultation. Reference: J Am David Radiol 3188;[id number]:[id number]. Us Retroperitoneal Limited    Result Date: 8/28/2020  1. Evaluation is limited due to patient body habitus as well as bowel gas and rib shadowing. 2. There appears to be moderate right and mild left-sided hydronephrosis. 3. Otherwise, no gross sonographic abnormality seen of the kidneys. Consultations:    Consults:     Final Specialist Recommendations/Findings:   IP CONSULT TO UROLOGY  IP CONSULT TO NEPHROLOGY  IP CONSULT TO ONCOLOGY      The patient was seen and examined on day of discharge and this discharge summary is in conjunction with any daily progress note from day of discharge.     Discharge plan:     Disposition: Home    Physician Follow Up:     Domi Johnson, APRN - CNP  [address] [address]    In 1 week      Gertrude Dang Renée Thomson MD  [address] Sukhjinder Delgado MD  [address]    In 4 weeks      Susan Bowie MD  [address] [facility] [phone]    In 2 weeks         Requiring Further Evaluation/Follow Up POST HOSPITALIZATION/Incidental Findings: Outpatient follow-up with vascular through PCP for aneurysm  PCP to monitor hemoglobin and renal function  PCP to monitor for hypertension    Diet: cardiac diet    Activity: As tolerated    Discharge Medications:      Medication List      START taking these medications    cephALEXin 500 MG capsule  Commonly known as:  KEFLEX  Take 1 capsule by mouth 2 times daily for 2 days     ferrous sulfate 325 (65 Fe) MG tablet  Commonly known as:  IRON 325  Take 1 tablet by mouth 2 times daily     metoprolol succinate 25 MG extended release tablet  Commonly known as: Toprol XL  Take 1 tablet by mouth daily Replaces carvedilol. Hold for systolic BP less than 888 or Heart rate less than 55     midodrine 10 MG tablet  Commonly known as:  PROAMATINE  Take 1 tablet by mouth 3 times daily (with meals) Hold for systolic BP more than 544     vitamin D3 25 MCG (1000 UT) Tabs tablet  Commonly known as:  CHOLECALCIFEROL  Take 2 tablets by mouth daily        CHANGE how you take these medications    aspirin 81 MG EC tablet  What changed:  Another medication with the same name was removed. Continue taking this medication, and follow the directions you see here.      furosemide 40 MG tablet  Commonly known as:  LASIX  Take 1 tablet by mouth daily Hold for systolic BP less than 228  What changed:    · when to take this  · additional instructions     potassium chloride 10 MEQ extended release tablet  Commonly known as:  KLOR-CON  Take 1 tablet by mouth daily Dose decreased to 10meq  What changed:    · how much to take  · additional instructions        CONTINUE taking these medications    * linaclotide 145 MCG capsule  Commonly known as:  LINZESS     * Linzess 145 MCG capsule  Generic drug:  linaclotide     rosuvastatin 10 MG tablet  Commonly known as:  CRESTOR     tamsulosin 0.4 MG capsule  Commonly known as:  FLOMAX         * This list has 2 medication(s) that are the same as other medications prescribed for you. Read the directions carefully, and ask your doctor or other care provider to review them with you. STOP taking these medications    carvedilol 25 MG tablet  Commonly known as:  COREG     Entresto 24-26 MG per tablet  Generic drug:  sacubitril-valsartan     Entresto  MG per tablet  Generic drug:  sacubitril-valsartan           Where to Get Your Medications      These medications were sent to [facility]/pharmacy #[id number]- [place] - [address] [facility] [address] - f [phone] [address] [address]    Phone:  [phone]   · cephALEXin 500 MG capsule  · ferrous sulfate 325 (65 Fe) MG tablet  · metoprolol succinate 25 MG extended release tablet  · midodrine 10 MG tablet  · vitamin D3 25 MCG (1000 UT) Tabs tablet     You can get these medications from any pharmacy    You don't need a prescription for these medications  · furosemide 40 MG tablet  · potassium chloride 10 MEQ extended release tablet         No discharge procedures on file. Time Spent on discharge is  32 mins in patient examination, evaluation, counseling as well as medication reconciliation, prescriptions for required medications, discharge plan and follow up. Electronically signed by   Nini Wagner MD  9/2/2020  3:43 PM      Thank you [facility] for the opportunity to be involved in this patient's care.

## 2020-09-02 NOTE — PROGRESS NOTES
Physical Therapy  Facility/Department: [facility] RENAL//MED SURG  Daily Treatment Note  NAME: Lindsay Souza  : 1933  MRN: [id number]    Date of Service: 2020    Discharge Recommendations:  Patient would benefit from continued therapy after discharge   PT Equipment Recommendations  Equipment Needed: No    Assessment   Body structures, Functions, Activity limitations: Decreased functional mobility ; Decreased balance;Decreased strength;Decreased endurance  Assessment: Pt completing functional mobility without physical assistance. Able to ambulate functional distances safely with RW. Prognosis: Good  PT Education: General Safety;Gait Training;Home Exercise Program  Patient Education: edema mgmt  REQUIRES PT FOLLOW UP: Yes  Activity Tolerance  Activity Tolerance: Patient Tolerated treatment well     Patient Diagnosis(es): There were no encounter diagnoses. has a past medical history of AAA (abdominal aortic aneurysm) ([facility] BPH (benign prostatic hyperplasia), CAD (coronary artery disease), GIB (gastrointestinal bleeding), Hyperlipidemia, Skin cancer, basal cell, Stroke (cerebrum) ([facility] and Systolic heart failure ([facility] has a past surgical history that includes Coronary artery bypass graft; Vocal Cord Surgery; Skin cancer excision; Eye surgery; Prostate biopsy (2020); and Prostate biopsy (N/A, 2020). Restrictions  Restrictions/Precautions  Restrictions/Precautions: General Precautions, Fall Risk  Position Activity Restriction  Other position/activity restrictions: up with assist, CBI  Subjective   General  Chart Reviewed: Yes  Response To Previous Treatment: Patient with no complaints from previous session. Family / Caregiver Present: Yes(wife, daughter)  Subjective  Subjective: Pt in chair; agreeable to PT. Reports mild pain in R knee.   General Comment  Comments: Returned to chair after session; call light in reach; BLEs elevated  Pain Screening  Patient Currently in Pain: Yes  Pain Assessment  Pain Level: 2  Vital Signs  Patient Currently in Pain: Yes       Orientation  Orientation  Overall Orientation Status: Within Normal Limits([place])   Objective    Transfers  Sit to Stand: Stand by assistance  Stand to sit: Stand by assistance  Ambulation  Ambulation?: Yes  Ambulation 1  Surface: level tile  Device: Rolling Walker  Assistance: Stand by assistance;Contact guard assistance  Quality of Gait: decreased pace, no LOB, flexed posture, cues to look up with fair return  Distance: 200ft  Comments: cues to keep RW within closer proximity when turning, reports mild SOB post ambulation     Balance  Posture: Fair  Sitting - Static: Good  Sitting - Dynamic: Good;-  Standing - Static: Fair;+  Standing - Dynamic: Fair;+      Exercises:  Seated LE exercise program: Long Arc Quads, hip abduction/adduction, heel/toe raises, and marches. Reps: 10x each  Upper extremity exercises: shoulder flexion/extension, punches, tricep curl. Reps: 10x each AROM    Goals  Short term goals  Time Frame for Short term goals: 14 visits  Short term goal 1: Perform bed mobility and functional transfers independently  Short term goal 2: Ambulate 300ft with least restricitve AD and SBA  Short term goal 3: Demo Good- dynamic standing balance to decrease risk of falls  Short term goal 4: Participate in 30 minutes of therapy to demo increased endurance    Plan    Plan  Times per week: 5-6x/wk  Current Treatment Recommendations: Strengthening, ROM, Balance Training, Functional Mobility Training, Transfer Training, Gait Training, Endurance Training, Home Exercise Program, Safety Education & Training, Patient/Caregiver Education & Training  Safety Devices  Type of devices:  All fall risk precautions in place, Left in chair, Call light within reach, Patient at risk for falls, Gait belt, Nurse notified  Restraints  Initially in place: No     Therapy Time   Individual Concurrent Group Co-treatment   Time In [address]         Time Out [id number] Minutes 23         Timed Code Treatment Minutes: [address] [facility]

## 2020-09-02 NOTE — PROGRESS NOTES
Urology Progress Note    Subjective: Ana Luisa Lim is a 80 y.o. male. His/Her current Diet is: DIET GENERAL;. The patient is POD 2 from prostate biopsy / 12 core    No acute events overnight. No chest pain, shortness of breath, nausea, vomiting, fevers, chills  Tolerating Panchal catheter. CBI restarted last evening for mild hematuria / Did not need hand irrigations overnight       Patient Vitals for the past 24 hrs:   BP Temp Temp src Pulse Resp SpO2 Weight   09/02/20 0524 -- -- -- -- -- -- 212 lb (96.2 kg)   09/02/20 0522 -- -- -- -- -- -- 212 lb (96.2 kg)   09/01/20 2330 (!) 100/52 98.5 °F (36.9 °C) Oral 71 14 96 % --   09/01/20 1952 (!) 97/35 -- -- 68 -- -- --   09/01/20 1945 (!) 93/40 98.6 °F (37 °C) Oral 68 16 95 % --   09/01/20 1530 (!) 102/52 99.5 °F (37.5 °C) Oral 73 16 95 % --   09/01/20 1206 (!) 99/46 98.6 °F (37 °C) Oral 68 16 97 % --   09/01/20 0800 (!) 94/50 98.4 °F (36.9 °C) Oral 70 14 -- --       Intake/Output Summary (Last 24 hours) at 9/2/2020 0622  Last data filed at 9/2/2020 1199  Gross per 24 hour   Intake 2518 ml   Output 1000 ml   Net 1518 ml       Recent Labs     08/30/20  0846 08/31/20  0924 09/01/20  0658 09/01/20  1812   WBC 8.4 11.5* 10.3  --    HGB 8.5* 8.2* 7.7* 8.6*   HCT 28.2* 27.1* 25.3* 28.3*   MCV 96.2 95.8 96.6  --    * 128* 132*  --      Recent Labs     08/31/20  0829 08/31/20  0924 09/01/20  0658    142 141   K 3.0* 3.2* 3.4*    103 105   CO2 25 25 25   PHOS 3.1 3.1 2.9   BUN 40* 40* 33*   CREATININE 2.44* 2.30* 1.95*       No results for input(s): COLORU, PHUR, LABCAST, WBCUA, RBCUA, MUCUS, TRICHOMONAS, YEAST, BACTERIA, CLARITYU, SPECGRAV, LEUKOCYTESUR, UROBILINOGEN, BILIRUBINUR, BLOODU in the last 72 hours. Invalid input(s): NITRATE, GLUCOSEUKETONESUAMORPHOUS    Physical Exam:     NAD, AOx3  RRR.  Peripheral pulses palpable  Respirations nonlabored, symmetric chest rise bilaterally  Abdomen: soft, nontender, nondistended  Lower extremities: No edema of calf tenderness bilaterally  Panchal 24F 3 way catheter on CBI with translucent pink tinged urine, on slow drip CBI/ stopped this am      Interval Imaging Findings:    Imaging was independently reviewed and checked with radiologist report: bone scan consistent with mets to spine and pelvis    Impression:      Selena Chanel is a 80 y.o. male admitted with      Problem List    POD 2 from Trans-Rectal Ultrasound Guided Biopsy of the Prostate  - Urinary Retention, Panchal for 3L  - Bilateral hydroureteronephrosis  - Post obstructive diuresis, physiologic  - Gross hematuria  - Elevated PSA, 97  - Osseus lesions on spine L1 and R Iliac bone  - Anemia, unclear etiology    Plan:     - AUR: Maintain Panchal catheter in view of high volume retention. Panchal exchange vs Void trial when following up with Dr Devon Paz at [facility]. - Gross hematuria restarted after prostate biopsy / some hematuria, hematochezia and hematospermia expected after prostate biopsy; CBI stopped this am - will monitor today am   - Drink to thirst given physiologic POD and CHF  - Monitor Hb, 8.7 from last evening. Aspirin on hold/ am labs pending   - Monitor Cr and UOP. ASHLEY resolving Cr from yesterday 1.95  - FU prostate biopsy results and follow up with Dr. Devon Paz in [place] to discuss results   - [id number] Rachelle Hudson for Dc from urology standpoint if staying off CBI this am and Hb stable.     Kristy Angeles  Urology Resident, PGY3  6:22 AM 9/2/2020

## 2020-09-03 ENCOUNTER — TELEPHONE (OUTPATIENT)
Dept: UROLOGY | Age: 85
End: 2020-09-03

## 2020-09-03 ENCOUNTER — CARE COORDINATION (OUTPATIENT)
Dept: CASE MANAGEMENT | Age: 85
End: 2020-09-03

## 2020-09-03 LAB — SURGICAL PATHOLOGY REPORT: NORMAL

## 2020-09-03 NOTE — TELEPHONE ENCOUNTER
Patient was released on 9/2/2020 from [facility]. He had a prostate biopsy and was instructed to follow up with Dr. Mariia Baez in one week. Please call his daughter, Emeka Gross, to schedule this at [phone].

## 2020-09-03 NOTE — CARE COORDINATION
[facility] Transitions Follow Up Call    9/3/2020    Patient: Luisito Mcneal  Patient : 1933   MRN: [id number]  Reason for Admission: BPCI-A Medical Bundle Patient:  Working DR-  Renal Failure     Admitting Location: [facility]  Adm Date: 20       Date of Discharge: 20  Discharge Date: 20 RARS: Readmission Risk Score: 32         Spoke with: Prestondinane Alton, patient's daughter    Spoke with patient's daughter who states patient is doing well at home. He has a Panchal catheter with red tinged urine, no clots. He does not have any pain and has no swelling to his lower legs. He denies any n/v, f/c or s/s of infection. Daughter is awaiting return call from urology office to get him in for an appointment. He is scheduled with Dr. Dain Boothe for  and Dr. Rosa Coleman for . [facility], daughter has already been in contact with them as well. Medications reviewed, patient has all medications at home. They deny any needs or concerns. Explained BPCI program and follow up calls. Care Transitions Subsequent and Final Call    Schedule Follow Up Appointment with PCP:  Completed  Subsequent and Final Calls  Do you have any ongoing symptoms?:  No  Have your medications changed?:  Yes  Do you have any questions related to your medications?:  No  Do you currently have any active services?:  Yes  Are you currently active with any services?:  Home Health  Do you have any needs or concerns that I can assist you with?:  No  Identified Barriers:  Lack of Education  Care Transitions Interventions  Other Interventions:             Follow Up  Future Appointments   Date Time Provider Gisela Hinkle   2020  2:00 PM [facility]   2020  3:10 PM [facility]       Chi Sarmiento RN

## 2020-09-14 ENCOUNTER — OFFICE VISIT (OUTPATIENT)
Dept: UROLOGY | Age: 85
End: 2020-09-14
Payer: MEDICARE

## 2020-09-14 VITALS
HEART RATE: 83 BPM | BODY MASS INDEX: 32.8 KG/M2 | RESPIRATION RATE: 16 BRPM | HEIGHT: 67 IN | DIASTOLIC BLOOD PRESSURE: 74 MMHG | OXYGEN SATURATION: 98 % | WEIGHT: 209 LBS | SYSTOLIC BLOOD PRESSURE: 112 MMHG

## 2020-09-14 PROCEDURE — 1123F ACP DISCUSS/DSCN MKR DOCD: CPT | Performed by: UROLOGY

## 2020-09-14 PROCEDURE — 1111F DSCHRG MED/CURRENT MED MERGE: CPT | Performed by: UROLOGY

## 2020-09-14 PROCEDURE — 4040F PNEUMOC VAC/ADMIN/RCVD: CPT | Performed by: UROLOGY

## 2020-09-14 PROCEDURE — G8427 DOCREV CUR MEDS BY ELIG CLIN: HCPCS | Performed by: UROLOGY

## 2020-09-14 PROCEDURE — G8417 CALC BMI ABV UP PARAM F/U: HCPCS | Performed by: UROLOGY

## 2020-09-14 PROCEDURE — 1036F TOBACCO NON-USER: CPT | Performed by: UROLOGY

## 2020-09-14 PROCEDURE — 99214 OFFICE O/P EST MOD 30 MIN: CPT

## 2020-09-14 PROCEDURE — 99215 OFFICE O/P EST HI 40 MIN: CPT | Performed by: UROLOGY

## 2020-09-14 RX ORDER — BICALUTAMIDE 50 MG/1
50 TABLET, FILM COATED ORAL DAILY
Qty: 30 TABLET | Refills: 1 | Status: SHIPPED | OUTPATIENT
Start: 2020-09-14 | End: 2020-10-14

## 2020-09-14 NOTE — PROGRESS NOTES
ANNMARIE James MD        [address] [address]  Dept: [phone]  Dept Fax: [phone]  Loc: [phone]      [facility] Urology Office Note - Follow up Visit    Patient:  Neema Arce  YOB: 1933  Date: 9/14/2020    The patient is a 80 y.o. male who presents today for evaluation of the following problems: prostate cancer  Chief Complaint   Patient presents with    Other     review prostate biopsy results        HISTORY OF PRESENT ILLNESS:     Prostate cancer  Onset was  Days ago  Overall, the problem(s) are worse. Severity is described as severe to profound. Associated Symptoms: No dysuria, +++ gross hematuria. Current Pain Severity: 2    Here to discuss biopsy results:    1.  PROSTATE, LLB, NEEDLE CORE BIOPSY:             -  ADENOCARCINOMA WITH ACINAR AND DUCTAL FEATURES, RAISSA   SCORE 4+4 = 8 (WHO/ISUP GRADE GROUP 4), INVOLVING 1 OF 1 CORE, 1.9 MM   DISCONTIGUOUS LENGTH, 24% TOTAL BIOPSY VOLUME.        -  PERINEURAL INVASION PRESENT. 2.  PROSTATE, LB, NEEDLE CORE BIOPSY:             -  ADENOCARCINOMA WITH ACINAR AND DUCTAL FEATURES, RAISSA   SCORE 4+3 = 7 (WHO/ISUP GRADE GROUP 3), INVOLVING 1 OF 1 CORE, 8.6 MM   DISCONTIGUOUS LENGTH 66% OF TOTAL BIOPSY VOLUME.        -  INTRADUCTAL CARCINOMA COMPONENT PRESENT.      3.  PROSTATE, LLM, NEEDLE CORE BIOPSY:             -  ADENOCARCINOMA WITH ACINAR AND DUCTAL FEATURES, RAISSA   SCORE 4+4 = 8 (WHO/ISUP GRADE GROUP 4), INVOLVING 1 OF 1 CORE, 7.9 MM   DISCONTIGUOUS LENGTH, 56% OF TOTAL BIOPSY VOLUME.        -  INTRADUCTAL CARCINOMA COMPONENT PRESENT.        -  PERINEURAL INVASION PRESENT.         4.  PROSTATE, LM, NEEDLE CORE BIOPSY:             -  ADENOCARCINOMA, ACINAR TYPE, RAISSA SCORE 4+3 = 7   (WHO/ISUP GRADE GROUP 3), INVOLVING 1 OF 1 CORE, 11.7 MM DISCONTIGUOUS   LENGTH, 78% OF TOTAL BIOPSY VOLUME.        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 5.  PROSTATE, LLA, NEEDLE CORE BIOPSY:             -  ADENOCARCINOMA WITH ACINAR AND DUCTAL FEATURES, RAISSA   SCORE 4+3 = 7 (WHO/ISUP GRADE GROUP 3), INVOLVING 1 OF 1 CORE, 1.7 MM   DISCONTIGUOUS LENGTH, 11% OF TOTAL BIOPSY VOLUME.        -  INTRADUCTAL CARCINOMA COMPONENT PRESENT. 6.  PROSTATE, LA, NEEDLE CORE BIOPSY:             -  ADENOCARCINOMA, ACINAR TYPE, RAISSA SCORE 4+3 = 7   (WHO/ISUP GRADE GROUP 3), INVOLVING 1 OF 1 CORE, 6.3 MM DISCONTIGUOUS   LENGTH, 42% OF TOTAL BIOPSY VOLUME. 7.  PROSTATE, RB, NEEDLE CORE BIOPSY:             -  ADENOCARCINOMA WITH ACINAR AND DUCTAL FEATURES, RAISSA   SCORE 4+4 = 8 (WHO/ISUP GRADE GROUP 4), INVOLVING 1 OF 1 CORE, 8.0 MM   DISCONTIGUOUS LENGTH, 53% OF TOTAL BIOPSY VOLUME.        -  INTRADUCTAL CARCINOMA COMPONENT PRESENT.        -  PERINEURAL INVASION PRESENT. 8.  PROSTATE, RLB, NEEDLE CORE BIOPSY:             -  ADENOCARCINOMA WITH ACINAR AND DUCTAL FEATURES, RAISSA   SCORE 4+4 = 8 (WHO/ISUP GRADE GROUP 4), INVOLVING 1 OF 1 CORE, 12.0 MM   DISCONTIGUOUS LENGTH, 80% OF TOTAL BIOPSY VOLUME.        -  INTRADUCTAL CARCINOMA COMPONENT PRESENT. 9.  PROSTATE, RM, NEEDLE CORE BIOPSY:             -  ADENOCARCINOMA WITH ACINAR AND DUCTAL FEATURES, RAISSA   SCORE 4+4 = 8 (WHO/ISUP GRADE GROUP 4), INVOLVING 1 OF 1 CORE, 13.2 MM   DISCONTIGUOUS LENGTH, 83% OF TOTAL BIOPSY VOLUME.        -  INTRADUCTAL CARCINOMA COMPONENT PRESENT. 10.  PROSTATE, RLM, NEEDLE CORE BIOPSY:             -  ADENOCARCINOMA WITH DUCTAL FEATURES, RAISSA SCORE 4+4 =   8 (WHO/ISUP GRADE GROUP 4), INVOLVING 1 OF 1 CORE, 3.3 MM   DISCONTIGUOUS LENGTH, 21% TOTAL BIOPSY VOLUME.        -  INTRADUCTAL CARCINOMA COMPONENT PRESENT.        -  PERINEURAL INVASION AND EXTRAPROSTATIC EXTENSION PRESENT.      11.  PROSTATE, RA, NEEDLE CORE BIOPSY:             -  ADENOCARCINOMA WITH ACINAR AND DUCTAL FEATURES, RAISSA   SCORE 4+5 = 9 (WHO/ISUP GRADE GROUP 5), INVOLVING 1 OF 1 CORE, 3.5 MM   DISCONTIGUOUS LENGTH, 25% OF TOTAL BIOPSY VOLUME.        -  INTRADUCTAL CARCINOMA COMPONENT PRESENT. 12.  PROSTATE, RLA, NEEDLE CORE BIOPSY:             -  ADENOCARCINOMA WITH DUCTAL FEATURES, RAISSA SCORE 4+4 =   8 (WHO/ISUP GRADE GROUP 4), INVOLVING 1 OF 1 CORE, 2.5 MM CONTIGUOUS   LENGTH, 16% OF TOTAL BIOPSY VOLUME. Requested/reviewed records from BAM Meeks CNP office and/or outside [de-identified]    (Patient's old records have been requested, reviewed and pertinent findings summarized in today's note.)    Procedures Today: N/A    Last several PSA's:  Lab Results   Component Value Date    PSA 97.66 (H) 08/28/2020       Last total testosterone:  No results found for: TESTOSTERONE    Urinalysis today:  No results found for this visit on 09/14/20. Last BUN and creatinine:  Lab Results   Component Value Date    BUN 27 (H) 09/02/2020     Lab Results   Component Value Date    CREATININE 1.50 (H) 09/02/2020       Additional Lab/Culture results: none    Imaging Reviewed during this Office Visit:   Pablo Inman MD independently reviewed the images and verified the radiology reports from:    Nm Bone Scan Whole Body    Result Date: 8/31/2020  EXAMINATION: WHOLE BODY BONE SCAN  8/29/2020 TECHNIQUE: The patient was injected intravenously with 27.0 mCi of 99 mTc MDP and scintigraphy of the entire skeleton was performed approximately three hours later. Spot images of the spine. COMPARISON: CT abdomen and pelvis dated 08/28/2020 HISTORY: ORDERING SYSTEM PROVIDED HISTORY: lytic bony lesions-suspected prostate. TECHNOLOGIST PROVIDED HISTORY: lytic bony lesions-suspected prostate. Reason for Exam: lytic bony lesions-suspected prostate Relevant Medical/Surgical History: acute renal failure FINDINGS: There is moderate multifocal abnormal intense uptake throughout the spine, most pronounced at L1.   There is abnormal uptake in the posterior pelvis involving the sacrum medial iliac bones. Small focus of posterior right 8th rib uptake. Several smaller faint foci of posterior rib uptake are noted bilaterally. Focal uptake of the shoulders is likely degenerative in nature. There is a small focus of anterior right costochondral uptake at what appears to be the 5th rib. Findings consistent with 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 Question small lesions within the ribs. Us Retroperitoneal Limited    Result Date: 8/28/2020  EXAMINATION: ULTRASOUND OF THE KIDNEYS 8/28/2020 9:25 pm COMPARISON: None. HISTORY: ORDERING SYSTEM PROVIDED HISTORY: carla TECHNOLOGIST PROVIDED HISTORY: Bilateral Renal Ultrasound carla Initial evaluation. FINDINGS: Evaluation is limited due to patient body habitus, overlying bowel gas and rib shadowing. The right kidney measures 12.2 cm in length and the left kidney measures 11.8 cm in length. Kidneys demonstrate normal cortical echogenicity. There is moderate right-sided hydronephrosis. Questionable mild left-sided hydronephrosis. No focal renal mass is clearly demonstrated. 1. Evaluation is limited due to patient body habitus as well as bowel gas and rib shadowing. 2. There appears to be moderate right and mild left-sided hydronephrosis. 3. Otherwise, no gross sonographic abnormality seen of the kidneys. PAST MEDICAL, FAMILY AND SOCIAL HISTORY:  Past Medical History:   Diagnosis Date    AAA (abdominal aortic aneurysm) (HCC)     BPH (benign prostatic hyperplasia)     CAD (coronary artery disease)     GIB (gastrointestinal bleeding)     found to have a bleeding spot in the duodenum cauterized and on iron replacement therapy continue. hb 10.2 in 7/2018 should recheck cbc with dr Win Kirby.      Hyperlipidemia     Skin cancer, basal cell     Stroke (cerebrum) (HCC)     Systolic heart failure (HCC)      Past Surgical History:   Procedure Laterality Date    CORONARY ARTERY BYPASS GRAFT cabg x 4 in 2000 in  [facility] BIOPSY  2020    PROSTATE BIOPSY N/A 2020    ** ADD ON - PROSTATE BIOPSY WITH ULTRASOUND ([place] 55 NOTIFIED DEPT) performed by Harleen Monson MD at [address] [facility]      Biopsy     Family History   Problem Relation Age of Onset    Heart Disease Mother     Hypertension Mother     Heart Disease Father     Heart Disease Brother      Outpatient Medications Marked as Taking for the 20 encounter (Office Visit) with Maricarmen Dill MD   Medication Sig Dispense Refill    bicalutamide (CASODEX) 50 MG chemo tablet Take 1 tablet by mouth daily 30 tablet 1    metoprolol succinate (TOPROL XL) 25 MG extended release tablet Take 1 tablet by mouth daily Replaces carvedilol. Hold for systolic BP less than 335 or Heart rate less than 55 30 tablet 3    vitamin D3 (CHOLECALCIFEROL) 25 MCG (1000 UT) TABS tablet Take 2 tablets by mouth daily 30 tablet 5    potassium chloride (KLOR-CON) 10 MEQ extended release tablet Take 1 tablet by mouth daily Dose decreased to 10meq      ferrous sulfate (IRON 325) 325 (65 Fe) MG tablet Take 1 tablet by mouth 2 times daily 60 tablet 2    midodrine (PROAMATINE) 10 MG tablet Take 1 tablet by mouth 3 times daily (with meals) Hold for systolic BP more than 791 90 tablet 0    tamsulosin (FLOMAX) 0.4 MG capsule Take 0.4 mg by mouth daily      rosuvastatin (CRESTOR) 10 MG tablet Take 10 mg by mouth daily      aspirin 81 MG EC tablet Take 81 mg by mouth daily         Patient has no known allergies.   Social History     Tobacco Use   Smoking Status Former Smoker    Packs/day: 0.50    Years: 15.00    Pack years: 7.50    Types: Cigarettes    Start date: 1953   Brenda Mckeon Last attempt to quit: 1966    Years since quittin.7   Smokeless Tobacco Never Used      (If patient a smoker, smoking cessation counseling offered)   Social History     Substance and Sexual Activity   Alcohol Use Yes   

## 2020-09-18 ENCOUNTER — CARE COORDINATION (OUTPATIENT)
Dept: CASE MANAGEMENT | Age: 85
End: 2020-09-18

## 2020-09-18 NOTE — CARE COORDINATION
[facility] Transitions Bundled Payments for Care Improvement (BPCI) Follow Up Call  Qualifying Diagnosis of Qualifying Diagnosis Renal Failure   9/18/2020  Patient Name:  Miguel A Murray   YOB: 1933  Discharge Date:  9/2/20  RARS:  Readmission Risk Score: 26    PCP:  BAM Faustin - CNP    Struggling with Panchal Catheter patency. States that the home Health Nurse doesn't seem very experienced with catheter care but that they love the therapists. Stated that the triage nurse was out last pm, could not help because the catheter was too large, they did not have one that size so they had to go to the ED to get the catheter to flow again. Hoping that the catheter gets discontinued soon. Other than this issue, doing ok.     Care Transitions will continue to follow per BPCI Program.  Jamarcus Strange, RN, CTN    Follow Up Concerns:  Future Appointments   Date Time Provider Gisela Hinkle   9/21/2020  2:00 PM [facility]   9/23/2020  3:10 PM [facility]   9/28/2020 10:20 AM MD SHANIKA [facility]

## 2020-09-21 ENCOUNTER — OFFICE VISIT (OUTPATIENT)
Dept: ONCOLOGY | Age: 85
End: 2020-09-21
Payer: MEDICARE

## 2020-09-21 VITALS
SYSTOLIC BLOOD PRESSURE: 116 MMHG | BODY MASS INDEX: 29.19 KG/M2 | HEART RATE: 90 BPM | WEIGHT: 186 LBS | DIASTOLIC BLOOD PRESSURE: 62 MMHG | TEMPERATURE: 98 F | OXYGEN SATURATION: 98 % | HEIGHT: 67 IN

## 2020-09-21 PROCEDURE — 99214 OFFICE O/P EST MOD 30 MIN: CPT | Performed by: INTERNAL MEDICINE

## 2020-09-21 PROCEDURE — 1111F DSCHRG MED/CURRENT MED MERGE: CPT | Performed by: INTERNAL MEDICINE

## 2020-09-21 PROCEDURE — 1036F TOBACCO NON-USER: CPT | Performed by: INTERNAL MEDICINE

## 2020-09-21 PROCEDURE — 4040F PNEUMOC VAC/ADMIN/RCVD: CPT | Performed by: INTERNAL MEDICINE

## 2020-09-21 PROCEDURE — 99215 OFFICE O/P EST HI 40 MIN: CPT | Performed by: INTERNAL MEDICINE

## 2020-09-21 PROCEDURE — G8427 DOCREV CUR MEDS BY ELIG CLIN: HCPCS | Performed by: INTERNAL MEDICINE

## 2020-09-21 PROCEDURE — G8417 CALC BMI ABV UP PARAM F/U: HCPCS | Performed by: INTERNAL MEDICINE

## 2020-09-21 PROCEDURE — 1123F ACP DISCUSS/DSCN MKR DOCD: CPT | Performed by: INTERNAL MEDICINE

## 2020-09-21 RX ORDER — CEPHALEXIN 500 MG/1
500 CAPSULE ORAL 2 TIMES DAILY
COMMUNITY
End: 2020-09-28

## 2020-09-21 NOTE — PROGRESS NOTES
daily      bicalutamide (CASODEX) 50 MG chemo tablet Take 1 tablet by mouth daily 30 tablet 1    metoprolol succinate (TOPROL XL) 25 MG extended release tablet Take 1 tablet by mouth daily Replaces carvedilol. Hold for systolic BP less than 202 or Heart rate less than 55 30 tablet 3    vitamin D3 (CHOLECALCIFEROL) 25 MCG (1000 UT) TABS tablet Take 2 tablets by mouth daily 30 tablet 5    potassium chloride (KLOR-CON) 10 MEQ extended release tablet Take 1 tablet by mouth daily Dose decreased to 10meq      ferrous sulfate (IRON 325) 325 (65 Fe) MG tablet Take 1 tablet by mouth 2 times daily 60 tablet 2    midodrine (PROAMATINE) 10 MG tablet Take 1 tablet by mouth 3 times daily (with meals) Hold for systolic BP more than 312 90 tablet 0    tamsulosin (FLOMAX) 0.4 MG capsule Take 0.4 mg by mouth daily      rosuvastatin (CRESTOR) 10 MG tablet Take 10 mg by mouth daily      aspirin 81 MG EC tablet Take 81 mg by mouth daily      furosemide (LASIX) 40 MG tablet Take 1 tablet by mouth daily Hold for systolic BP less than 554 (Patient not taking: Reported on 9/14/2020)      linaclotide (LINZESS) 145 MCG capsule Take 145 mcg by mouth every morning (before breakfast)       No current facility-administered medications for this visit. Allergies:  Patient has no known allergies. Social History:   reports that he quit smoking about 54 years ago. His smoking use included cigarettes. He started smoking about 67 years ago. He has a 7.50 pack-year smoking history. He has never used smokeless tobacco. He reports current alcohol use. He reports that he does not use drugs. Family History: family history includes Heart Disease in his brother, father, and mother; Hypertension in his mother. REVIEW OF SYSTEMS:    Constitutional: No fever or chills.  No night sweats, no weight loss   Eyes: No eye discharge, double vision, or eye pain   HEENT: negative for sore mouth, sore throat, hoarseness and voice change Respiratory: negative for cough , sputum, dyspnea, wheezing, hemoptysis, chest pain   Cardiovascular: negative for chest pain, dyspnea, palpitations, orthopnea, PND   Gastrointestinal: negative for nausea, vomiting, diarrhea, constipation, abdominal pain, Dysphagia, hematemesis and hematochezia   Genitourinary: negative for frequency, dysuria, nocturia, urinary incontinence, and hematuria   Integument: negative for rash, skin lesions, bruises.    Hematologic/Lymphatic: negative for easy bruising, bleeding, lymphadenopathy, or petechiae   Endocrine: negative for heat or cold intolerance,weight changes, change in bowel habits and hair loss   Musculoskeletal: negative for myalgias, arthralgias, pain, joint swelling,and bone pain   Neurological: negative for headaches, dizziness, seizures, weakness, numbness    PHYSICAL EXAM:      BP (!) 140/74 (Site: Right Upper Arm, Position: Sitting, Cuff Size: Medium Adult)   Pulse 90   Temp 98 °F (36.7 °C)   Ht 5' 7\" (1.702 m)   Wt 186 lb (84.4 kg)   SpO2 98%   BMI 29.13 kg/m²    Temp (24hrs), Av.5 °F (36.9 °C), Min:98.1 °F (36.7 °C), Max:98.8 °F (37.1 °C)    General appearance - well appearing, no in pain or distress   Mental status - alert and cooperative   Eyes - pupils equal and reactive, extraocular eye movements intact   Ears - bilateral TM's and external ear canals normal   Mouth - mucous membranes moist, pharynx normal without lesions   Neck - supple, no significant adenopathy   Lymphatics - no palpable lymphadenopathy, no hepatosplenomegaly   Chest - clear to auscultation, no wheezes, rales or rhonchi, symmetric air entry   Heart - normal rate, regular rhythm, normal S1, S2, no murmurs  Abdomen - soft, nontender, nondistended, no masses or organomegaly   Neurological - alert, oriented, normal speech, no focal findings or movement disorder noted   Musculoskeletal - no joint tenderness, deformity or swelling   Extremities - peripheral pulses normal, no pedal edema, no clubbing or cyanosis   Skin - normal coloration and turgor, no rashes, no suspicious skin lesions noted ,    DATA:    Labs:   CBC:   No results for input(s): WBC, HGB, HCT, PLT in the last 72 hours. BMP:   No results for input(s): NA, K, CO2, BUN, CREATININE, LABGLOM, GLUCOSE in the last 72 hours. PT/INR: No results for input(s): PROTIME, INR in the last 72 hours. PATHOLOGY DATA  Surgical Pathology Report   Surgical Pathology   Collected:  08/31/20 1530    Lab status:  Final    Resulting lab:  [facility]    Value:  -- Diagnosis --   1.  PROSTATE, LLB, NEEDLE CORE BIOPSY:             -  ADENOCARCINOMA WITH ACINAR AND DUCTAL FEATURES, RAISSA   SCORE 4+4 = 8 (WHO/ISUP GRADE GROUP 4), INVOLVING 1 OF 1 CORE, 1.9 MM   DISCONTIGUOUS LENGTH, 24% TOTAL BIOPSY VOLUME.        -  PERINEURAL INVASION PRESENT. 2.  PROSTATE, LB, NEEDLE CORE BIOPSY:             -  ADENOCARCINOMA WITH ACINAR AND DUCTAL FEATURES, RAISSA   SCORE 4+3 = 7 (WHO/ISUP GRADE GROUP 3), INVOLVING 1 OF 1 CORE, 8.6 MM   DISCONTIGUOUS LENGTH 66% OF TOTAL BIOPSY VOLUME.        -  INTRADUCTAL CARCINOMA COMPONENT PRESENT. 3.  PROSTATE, LLM, NEEDLE CORE BIOPSY:             -  ADENOCARCINOMA WITH ACINAR AND DUCTAL FEATURES, RAISSA   SCORE 4+4 = 8 (WHO/ISUP GRADE GROUP 4), INVOLVING 1 OF 1 CORE, 7.9 MM   DISCONTIGUOUS LENGTH, 56% OF TOTAL BIOPSY VOLUME.        -  INTRADUCTAL CARCINOMA COMPONENT PRESENT.        -  PERINEURAL INVASION PRESENT.         4.  PROSTATE, LM, NEEDLE CORE BIOPSY:             -  ADENOCARCINOMA, ACINAR TYPE, RAISSA SCORE 4+3 = 7   (WHO/ISUP GRADE GROUP 3), INVOLVING 1 OF 1 CORE, 11.7 MM DISCONTIGUOUS   LENGTH, 78% OF TOTAL BIOPSY VOLUME.        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      5.  PROSTATE, LLA, NEEDLE CORE BIOPSY:             -  ADENOCARCINOMA WITH ACINAR AND DUCTAL FEATURES, RAISSA   SCORE 4+3 = 7 (WHO/ISUP GRADE GROUP 3), INVOLVING 1 OF 1 CORE, 1.7 MM   DISCONTIGUOUS LENGTH, 11% OF TOTAL BIOPSY VOLUME.        -  INTRADUCTAL CARCINOMA COMPONENT PRESENT. 6.  PROSTATE, LA, NEEDLE CORE BIOPSY:             -  ADENOCARCINOMA, ACINAR TYPE, RAISSA SCORE 4+3 = 7   (WHO/ISUP GRADE GROUP 3), INVOLVING 1 OF 1 CORE, 6.3 MM DISCONTIGUOUS   LENGTH, 42% OF TOTAL BIOPSY VOLUME. 7.  PROSTATE, RB, NEEDLE CORE BIOPSY:             -  ADENOCARCINOMA WITH ACINAR AND DUCTAL FEATURES, RAISSA   SCORE 4+4 = 8 (WHO/ISUP GRADE GROUP 4), INVOLVING 1 OF 1 CORE, 8.0 MM   DISCONTIGUOUS LENGTH, 53% OF TOTAL BIOPSY VOLUME.        -  INTRADUCTAL CARCINOMA COMPONENT PRESENT.        -  PERINEURAL INVASION PRESENT. 8.  PROSTATE, RLB, NEEDLE CORE BIOPSY:             -  ADENOCARCINOMA WITH ACINAR AND DUCTAL FEATURES, RAISSA   SCORE 4+4 = 8 (WHO/ISUP GRADE GROUP 4), INVOLVING 1 OF 1 CORE, 12.0 MM   DISCONTIGUOUS LENGTH, 80% OF TOTAL BIOPSY VOLUME.        -  INTRADUCTAL CARCINOMA COMPONENT PRESENT. 9.  PROSTATE, RM, NEEDLE CORE BIOPSY:             -  ADENOCARCINOMA WITH ACINAR AND DUCTAL FEATURES, RAISSA   SCORE 4+4 = 8 (WHO/ISUP GRADE GROUP 4), INVOLVING 1 OF 1 CORE, 13.2 MM   DISCONTIGUOUS LENGTH, 83% OF TOTAL BIOPSY VOLUME.        -  INTRADUCTAL CARCINOMA COMPONENT PRESENT. 10.  PROSTATE, RLM, NEEDLE CORE BIOPSY:             -  ADENOCARCINOMA WITH DUCTAL FEATURES, RAISSA SCORE 4+4 =   8 (WHO/ISUP GRADE GROUP 4), INVOLVING 1 OF 1 CORE, 3.3 MM   DISCONTIGUOUS LENGTH, 21% TOTAL BIOPSY VOLUME.        -  INTRADUCTAL CARCINOMA COMPONENT PRESENT.        -  PERINEURAL INVASION AND EXTRAPROSTATIC EXTENSION PRESENT. 11.  PROSTATE, RA, NEEDLE CORE BIOPSY:             -  ADENOCARCINOMA WITH ACINAR AND DUCTAL FEATURES, RAISSA   SCORE 4+5 = 9 (WHO/ISUP GRADE GROUP 5), INVOLVING 1 OF 1 CORE, 3.5 MM   DISCONTIGUOUS LENGTH, 25% OF TOTAL BIOPSY VOLUME.        -  INTRADUCTAL CARCINOMA COMPONENT PRESENT.      12.  PROSTATE, RLA, NEEDLE CORE BIOPSY:             -  ADENOCARCINOMA WITH DUCTAL FEATURES, RAISSA SCORE 4+4 =   8 (WHO/ISUP GRADE GROUP 4), INVOLVING 1 OF 1 CORE, 2.5 MM CONTIGUOUS   LENGTH, 16% OF TOTAL BIOPSY VOLUME. IMAGING DATA:  Bone scan 8/29/2020: Impression    Findings consistent with metastatic disease within the spine and posterior    pelvis.  Question small lesions within the ribs. CT chest abdomen pelvis 8/31/2020: Impression    Chest:         1. Osteopenia with superimposed diffuse degenerative changes could obscure    subtle metastatic disease. 2. Subtle sclerotic foci in T1 and T2 vertebral bodies likely metastatic. 3. No metastatic nodules. 4. Small bilateral pleural effusions right greater than left similar to prior. Abdomen pelvis:         1. Interval catheterization of the urinary bladder.  Thickened bladder wall    with new marked perivesical stranding.  Suspect cystitis. 2. Mild bilateral hydronephrosis slightly improved from prior but bilateral    hydroureter is unchanged. 3. A 4.4 cm infrarenal abdominal aortic aneurysm. IMPRESSION:   1. Metastatic prostate cancer: Elevated PSA with bony lesions suspicious for prostate cancer  2. Obstructive uropathy with bilateral ureteral hydronephrosis  3. CHF  4. ASHLEY/CKD  5. Bone lesions  6. Anemia: Status post IV iron infusion in the hospital  7. Mild thrombocytopenia    RECOMMENDATIONS:  1. I reviewed the laboratory data, imaging studies, diagnosis, prognosis and treatment options with patient  2. Patient's bone scan is consistent with metastatic disease in the spine and posterior pelvis  3. I had discussion with patient and his daughter Izabel Arizmendi. I explained that prostate cancer is slow-growing and even with metastatic disease can be controlled with androgen depression therapy which is much tolerable  4. Continue Casodex and will plan follow-up Lupron  5. Patient will also be started on Zometa as a supportive therapy  6. We will check CBC, CMP and PSA level with each treatment  7.  Return to clinic with a second

## 2020-09-21 NOTE — PROGRESS NOTES
Writer notified Zana Phelan at [facility] center for need for Lupron and zometa.

## 2020-09-23 ENCOUNTER — OFFICE VISIT (OUTPATIENT)
Dept: NEPHROLOGY | Age: 85
End: 2020-09-23
Payer: MEDICARE

## 2020-09-23 VITALS
WEIGHT: 187 LBS | HEIGHT: 67 IN | SYSTOLIC BLOOD PRESSURE: 102 MMHG | HEART RATE: 84 BPM | DIASTOLIC BLOOD PRESSURE: 60 MMHG | BODY MASS INDEX: 29.35 KG/M2

## 2020-09-23 PROCEDURE — 99214 OFFICE O/P EST MOD 30 MIN: CPT | Performed by: INTERNAL MEDICINE

## 2020-09-23 PROCEDURE — 1111F DSCHRG MED/CURRENT MED MERGE: CPT | Performed by: INTERNAL MEDICINE

## 2020-09-23 PROCEDURE — G8427 DOCREV CUR MEDS BY ELIG CLIN: HCPCS | Performed by: INTERNAL MEDICINE

## 2020-09-23 PROCEDURE — 4040F PNEUMOC VAC/ADMIN/RCVD: CPT | Performed by: INTERNAL MEDICINE

## 2020-09-23 PROCEDURE — 99214 OFFICE O/P EST MOD 30 MIN: CPT

## 2020-09-23 PROCEDURE — 1036F TOBACCO NON-USER: CPT | Performed by: INTERNAL MEDICINE

## 2020-09-23 PROCEDURE — G8417 CALC BMI ABV UP PARAM F/U: HCPCS | Performed by: INTERNAL MEDICINE

## 2020-09-23 PROCEDURE — 1123F ACP DISCUSS/DSCN MKR DOCD: CPT | Performed by: INTERNAL MEDICINE

## 2020-09-23 NOTE — PROGRESS NOTES
Nephrology Progress Note    Ana Luisa Cooneyseng Hodges, APRN - [address] 65      Patient is seen back after a significant acute kidney injury episode prompting admission to [address] [place] [address]. The patient has a history of chronic kidney disease stage III and a history of ischemic cardiomyopathy previously on Entresto. Apparently, according the patient's daughter, the patient had improvement in ejection fraction from the range of 30 to 35% up to a range of 40 to 45% on that medication. The patient was found to have a BUN of 98 and creatinine 7.5 with a potassium of 5.9 on 8/25/2020. Those labs prompted transfer as the patient had a creatinine back in July 2020 of 1.96 with normal potassium. The patient ended up having a Panchal catheter placed because of obstructive uropathy. The patient now has a chronic indwelling Panchal catheter. He also had a prostate biopsy which showed prostate cancer. He is now being seen in the outpatient by Dr. Lida Walden urology and also by Dr. Nneka Hernandez, oncology. The hope is that eventually the patient will be able to have the Panchal catheter removed if he has enough bladder function. There is no set timeframe for removal of the Panchal catheter. Lab data following the discharge from 9/13/2020 showed a BUN 24 and creatinine 1.5 with normal electrolytes, including a potassium of 4.9. The patient recently saw his cardiologist, Dr. He Velez, and there was discussion about the timing as to when to get the patient back on his Entresto. The patient's dose was . I just spoke with Dr. He Velez, who would like to hold off for right now on restarting Entresto. He also says that whenever the decision is made to possibly put him back on that medication he would likely put him back on the lowest dose 24-26.   I will attempt to reconnect with the patient's daughter to let her know that other than the midodrine we discontinued today there will be no other changes in medications. The patient will continue on his beta-blocker. The patient states his systolic blood pressures typically run in the range of low 100s to mid 120s. Patient feels well. He has no edema. He has no shortness of breath. He is on room air. He has no chest pain. He has pale yellow urine in the Panchal tubing and in the collection bag. He moves his bowels about every other day. He did have some blood in the Panchal collection apparatus couple days ago but that has improved. Medications are reviewed today. Chief Complaint   Patient presents with    Follow-Up from Hospital     renal failure       Patient Active Problem List   Diagnosis    Acute renal failure ([facility]    Hydroureteronephrosis    Hyperkalemia    Coronary artery disease involving coronary bypass graft of native heart without angina pectoris    Benign prostatic hyperplasia with urinary obstruction    Abdominal aortic aneurysm (AAA) without rupture ([facility]    Coronary arteriosclerosis in native artery    Hypercholesterolemia    Mixed hyperlipidemia    Upper gastrointestinal bleeding    Chronic systolic heart failure (HCC)    Benign hypertension with chronic kidney disease, stage II    LBBB (left bundle branch block)    Gross hematuria    CKD (chronic kidney disease) stage 3, GFR 30-59 ml/min (HCC)    Urinary obstruction    Prostate cancer, primary, with metastasis from prostate to other site [facility])    ASHLEY (acute kidney injury) ([facility]    Elevated PSA       REVIEW OF SYSTEMS     Constitutional: No fever, no chills, no lethargy, no weakness. HEENT:  No headache, ear pain, itchy eyes, nasal discharge or sore throat. Cardiac:  No chest pain, shortness of breath, orthopnea or PND. Chest:  No cough, phlegm or wheezing. Abdomen:  No abdominal pain, nausea or vomiting. Neuro:  No focal weakness, abnormal movements or seizure like activity. Skin:   No rashes, no itching.   :   Chronic indwelling Panchal catheter in place, see notations above   Extremities:  No swelling or joint pains. OBJECTIVE      Vitals:    09/23/20 1442   BP: 102/60   Pulse: 84     Wt Readings from Last 2 Encounters:   09/23/20 187 lb (84.8 kg)   09/21/20 186 lb (84.4 kg)        PHYSICAL EXAM      GENERAL APPEARANCE:Awake, alert, in no acute distress  SKIN: warm and dry, no rash or erythema  EYES: conjunctivae pale and sclera anicteric  ENT: no thrush, moist mucus membranes   NECK: No obvious JVD, midline trachea, no accessory muscle use  PULMONARY: Good bilateral air entry and clear to auscultation bilaterally except for some diminished breath sounds at the lung bases and without wheezes or rales or rhonchi  CARDIOVASCULAR: Regular rate and rhythm with positive S1 and S2 no rubs  ABDOMEN: SOFT AND NONTENDER AND NONDISTENDED WITH ACTIVE BOWEL SOUNDS, Panchal in place   EXTREMITIES: No pitting pretibial or ankle edema bilaterally    CURRENT MEDICATIONS      Current Outpatient Medications   Medication Sig Dispense Refill    cephALEXin (KEFLEX) 500 MG capsule Take 500 mg by mouth 2 times daily      bicalutamide (CASODEX) 50 MG chemo tablet Take 1 tablet by mouth daily 30 tablet 1    metoprolol succinate (TOPROL XL) 25 MG extended release tablet Take 1 tablet by mouth daily Replaces carvedilol.  Hold for systolic BP less than 978 or Heart rate less than 55 30 tablet 3    vitamin D3 (CHOLECALCIFEROL) 25 MCG (1000 UT) TABS tablet Take 2 tablets by mouth daily 30 tablet 5    potassium chloride (KLOR-CON) 10 MEQ extended release tablet Take 1 tablet by mouth daily Dose decreased to 10meq      ferrous sulfate (IRON 325) 325 (65 Fe) MG tablet Take 1 tablet by mouth 2 times daily 60 tablet 2    tamsulosin (FLOMAX) 0.4 MG capsule Take 0.4 mg by mouth daily      rosuvastatin (CRESTOR) 10 MG tablet Take 10 mg by mouth daily      aspirin 81 MG EC tablet Take 81 mg by mouth daily      furosemide (LASIX) 40 MG tablet Take 1 tablet by mouth daily indwelling Panchal catheter and seeing urology and oncology  6. No evidence of fluid overload  7. Acute on chronic kidney injury secondary to urinary tract obstruction, improved       PLAN     1. Discontinue midodrine   2. Cardiology will determine eventual timing and dosing of Entresto  3. Monitor urology and oncology evaluation and treatment plans  4. Return to see me in 3 months      Please do not hesitate to call with questions.     Electronically signed by Jaime Miller MD on 9/23/2020 at 3:53 PM

## 2020-09-25 ENCOUNTER — CARE COORDINATION (OUTPATIENT)
Dept: CASE MANAGEMENT | Age: 85
End: 2020-09-25

## 2020-09-25 NOTE — CARE COORDINATION
[facility] Transitions Follow Up Call    2020    Patient: Darron Solano  Patient : 1933   MRN: [id number]  Reason for Admission:   Discharge Date: 20 RARS: Readmission Risk Score: 26    Follow Up: Attempted to contact patient for BPCI-A follow up. Unable to reach patient. Left message with contact information and request for call back.       Future Appointments   Date Time Provider Gisela Hinkle   2020 10[facility]   [facility]1/2020 11:00  [facility] MED ONC CHAIR 05 MD MED ONC [place]   2020  2:30 PM Alexandra Gomes MD [facility]   2020 11:10 AM Madisyn Davis MD [facility] DPP       Jose J Stubbs RN

## 2020-09-28 ENCOUNTER — PROCEDURE VISIT (OUTPATIENT)
Dept: UROLOGY | Age: 85
End: 2020-09-28
Payer: MEDICARE

## 2020-09-28 VITALS — SYSTOLIC BLOOD PRESSURE: 118 MMHG | HEART RATE: 84 BPM | DIASTOLIC BLOOD PRESSURE: 56 MMHG

## 2020-09-28 PROCEDURE — 1111F DSCHRG MED/CURRENT MED MERGE: CPT | Performed by: UROLOGY

## 2020-09-28 PROCEDURE — G8417 CALC BMI ABV UP PARAM F/U: HCPCS | Performed by: UROLOGY

## 2020-09-28 PROCEDURE — 99214 OFFICE O/P EST MOD 30 MIN: CPT | Performed by: UROLOGY

## 2020-09-28 PROCEDURE — 1036F TOBACCO NON-USER: CPT | Performed by: UROLOGY

## 2020-09-28 PROCEDURE — 4040F PNEUMOC VAC/ADMIN/RCVD: CPT | Performed by: UROLOGY

## 2020-09-28 PROCEDURE — G8427 DOCREV CUR MEDS BY ELIG CLIN: HCPCS | Performed by: UROLOGY

## 2020-09-28 PROCEDURE — 1123F ACP DISCUSS/DSCN MKR DOCD: CPT | Performed by: UROLOGY

## 2020-09-28 PROCEDURE — 51702 INSERT TEMP BLADDER CATH: CPT | Performed by: UROLOGY

## 2020-09-28 RX ORDER — MIDODRINE HYDROCHLORIDE 5 MG/1
10 TABLET ORAL NIGHTLY
COMMUNITY
End: 2020-01-01

## 2020-09-28 NOTE — PROGRESS NOTES
ANNMARIE ACOSTA [facility], MD        [address] [address]  Dept: [phone]  Dept Fax: [phone]  Loc: [phone]      [facility] Urology Office Note - Follow up Visit    Patient:  Sivakumar Rocha  YOB: 1933  Date: 9/28/2020    The patient is a 80 y.o. male who presents today for evaluation of the following problems: prostate cancer  Chief Complaint   Patient presents with    Prostate Cancer        HISTORY OF PRESENT ILLNESS:     Prostate cancer  Onset was  Days ago  Overall, the problem(s) are unchanged  Severity is described as severe to profound. Associated Symptoms: No dysuria, +++ gross hematuria. Current Pain Severity: 2    Here for catheter exchange and to discuss cystoscopy    1.  PROSTATE, LLB, NEEDLE CORE BIOPSY:             -  ADENOCARCINOMA WITH ACINAR AND DUCTAL FEATURES, RAISSA   SCORE 4+4 = 8 (WHO/ISUP GRADE GROUP 4), INVOLVING 1 OF 1 CORE, 1.9 MM   DISCONTIGUOUS LENGTH, 24% TOTAL BIOPSY VOLUME.        -  PERINEURAL INVASION PRESENT. 2.  PROSTATE, LB, NEEDLE CORE BIOPSY:             -  ADENOCARCINOMA WITH ACINAR AND DUCTAL FEATURES, RAISSA   SCORE 4+3 = 7 (WHO/ISUP GRADE GROUP 3), INVOLVING 1 OF 1 CORE, 8.6 MM   DISCONTIGUOUS LENGTH 66% OF TOTAL BIOPSY VOLUME.        -  INTRADUCTAL CARCINOMA COMPONENT PRESENT.      3.  PROSTATE, LLM, NEEDLE CORE BIOPSY:             -  ADENOCARCINOMA WITH ACINAR AND DUCTAL FEATURES, RAISSA   SCORE 4+4 = 8 (WHO/ISUP GRADE GROUP 4), INVOLVING 1 OF 1 CORE, 7.9 MM   DISCONTIGUOUS LENGTH, 56% OF TOTAL BIOPSY VOLUME.        -  INTRADUCTAL CARCINOMA COMPONENT PRESENT.        -  PERINEURAL INVASION PRESENT.         4.  PROSTATE, LM, NEEDLE CORE BIOPSY:             -  ADENOCARCINOMA, ACINAR TYPE, RAISSA SCORE 4+3 = 7   (WHO/ISUP GRADE GROUP 3), INVOLVING 1 OF 1 CORE, 11.7 MM DISCONTIGUOUS   LENGTH, 78% OF TOTAL BIOPSY VOLUME.        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 5.  PROSTATE, LLA, NEEDLE CORE BIOPSY:             -  ADENOCARCINOMA WITH ACINAR AND DUCTAL FEATURES, RAISSA   SCORE 4+3 = 7 (WHO/ISUP GRADE GROUP 3), INVOLVING 1 OF 1 CORE, 1.7 MM   DISCONTIGUOUS LENGTH, 11% OF TOTAL BIOPSY VOLUME.        -  INTRADUCTAL CARCINOMA COMPONENT PRESENT. 6.  PROSTATE, LA, NEEDLE CORE BIOPSY:             -  ADENOCARCINOMA, ACINAR TYPE, RAISSA SCORE 4+3 = 7   (WHO/ISUP GRADE GROUP 3), INVOLVING 1 OF 1 CORE, 6.3 MM DISCONTIGUOUS   LENGTH, 42% OF TOTAL BIOPSY VOLUME. 7.  PROSTATE, RB, NEEDLE CORE BIOPSY:             -  ADENOCARCINOMA WITH ACINAR AND DUCTAL FEATURES, RAISSA   SCORE 4+4 = 8 (WHO/ISUP GRADE GROUP 4), INVOLVING 1 OF 1 CORE, 8.0 MM   DISCONTIGUOUS LENGTH, 53% OF TOTAL BIOPSY VOLUME.        -  INTRADUCTAL CARCINOMA COMPONENT PRESENT.        -  PERINEURAL INVASION PRESENT. 8.  PROSTATE, RLB, NEEDLE CORE BIOPSY:             -  ADENOCARCINOMA WITH ACINAR AND DUCTAL FEATURES, RAISSA   SCORE 4+4 = 8 (WHO/ISUP GRADE GROUP 4), INVOLVING 1 OF 1 CORE, 12.0 MM   DISCONTIGUOUS LENGTH, 80% OF TOTAL BIOPSY VOLUME.        -  INTRADUCTAL CARCINOMA COMPONENT PRESENT. 9.  PROSTATE, RM, NEEDLE CORE BIOPSY:             -  ADENOCARCINOMA WITH ACINAR AND DUCTAL FEATURES, RAISSA   SCORE 4+4 = 8 (WHO/ISUP GRADE GROUP 4), INVOLVING 1 OF 1 CORE, 13.2 MM   DISCONTIGUOUS LENGTH, 83% OF TOTAL BIOPSY VOLUME.        -  INTRADUCTAL CARCINOMA COMPONENT PRESENT. 10.  PROSTATE, RLM, NEEDLE CORE BIOPSY:             -  ADENOCARCINOMA WITH DUCTAL FEATURES, RAISSA SCORE 4+4 =   8 (WHO/ISUP GRADE GROUP 4), INVOLVING 1 OF 1 CORE, 3.3 MM   DISCONTIGUOUS LENGTH, 21% TOTAL BIOPSY VOLUME.        -  INTRADUCTAL CARCINOMA COMPONENT PRESENT.        -  PERINEURAL INVASION AND EXTRAPROSTATIC EXTENSION PRESENT.      11.  PROSTATE, RA, NEEDLE CORE BIOPSY:             -  ADENOCARCINOMA WITH ACINAR AND DUCTAL FEATURES, RAISSA   SCORE 4+5 = 9 (WHO/ISUP GRADE GROUP 5), INVOLVING 1 OF 1 CORE, 3.5 MM   DISCONTIGUOUS LENGTH, 25% OF TOTAL BIOPSY VOLUME.        -  INTRADUCTAL CARCINOMA COMPONENT PRESENT. 12.  PROSTATE, RLA, NEEDLE CORE BIOPSY:             -  ADENOCARCINOMA WITH DUCTAL FEATURES, RAISSA SCORE 4+4 =   8 (WHO/ISUP GRADE GROUP 4), INVOLVING 1 OF 1 CORE, 2.5 MM CONTIGUOUS   LENGTH, 16% OF TOTAL BIOPSY VOLUME. Secondary Diagnosis:    BPH- s/p TURP. Has catheter in place    Gross hematuria- no new hematuria episodes      Requested/reviewed records from [facility] - CNP office and/or outside physician/EMR    (Patient's old records have been requested, reviewed and pertinent findings summarized in today's note.)    Procedures Today: N/A    Last several PSA's:  Lab Results   Component Value Date    PSA 97.66 (H) 08/28/2020       Last total testosterone:  No results found for: TESTOSTERONE    Urinalysis today:  No results found for this visit on 09/28/20. Last BUN and creatinine:  Lab Results   Component Value Date    BUN 27 (H) 09/02/2020     Lab Results   Component Value Date    CREATININE 1.50 (H) 09/02/2020       Additional Lab/Culture results: none    Imaging Reviewed during this Office Visit:   Helen Miller MD independently reviewed the images and verified the radiology reports from:    Nm Bone Scan Whole Body    Result Date: 8/31/2020  EXAMINATION: WHOLE BODY BONE SCAN  8/29/2020 TECHNIQUE: The patient was injected intravenously with 27.0 mCi of 99 mTc MDP and scintigraphy of the entire skeleton was performed approximately three hours later. Spot images of the spine. COMPARISON: CT abdomen and pelvis dated 08/28/2020 HISTORY: ORDERING SYSTEM PROVIDED HISTORY: lytic bony lesions-suspected prostate. TECHNOLOGIST PROVIDED HISTORY: lytic bony lesions-suspected prostate.  Reason for Exam: lytic bony lesions-suspected prostate Relevant Medical/Surgical History: acute renal failure FINDINGS: There is moderate multifocal Substance and Sexual Activity   Alcohol Use Yes    Frequency: 4 or more times a week    Drinks per session: 1 or 2    Binge frequency: Never       REVIEW OF SYSTEMS:  Constitutional: negative  Eyes: negative  Respiratory: negative  Cardiovascular: negative  Gastrointestinal: negative  Genitourinary: see HPI  Musculoskeletal: negative  Skin: negative   Neurological: negative  Hematological/Lymphatic: negative  Psychological: negative        Physical Exam:    This a 80 y.o. male  Vitals:    09/28/20 1023   BP: (!) 118/56   Pulse: 84     There is no height or weight on file to calculate BMI. Constitutional: Patient in no acute distress;       Assessment and Plan        1. Urinary retention    2. Prostate cancer ([facility] [address]    3. Gross hematuria               Plan:      ADT/casodex and advanced prostate cancer meds per oncology. Seeing Dr Murel Hammans hematuria secondary to catheter/bph/prostate cancer  Catheter exchanged today  Follow up for cystoscopy local and voiding trial in one month in office      Prescriptions Ordered:  No orders of the defined types were placed in this encounter. Orders Placed:  No orders of the defined types were placed in this encounter.            Gerhardt Camera, MD

## 2020-09-28 NOTE — PROGRESS NOTES
Following Dr. Radha Rivas plan of care:    Changed 16 FR catheter changed without difficulty. Once balloon was deflated, removed catheter in total without difficulty. Patient's penis was cleansed with betadine. 2016 [facility] payne was inserted without difficulty. Upon urine return, balloon inflated with 10cc sterile water. Payne catheter was hooked up to leg bag with straps. Patient instructed on catheter care including draining catheter bag and keeping catheter bag above the knee to prevent pulling on catheter causing blood.

## 2020-10-01 ENCOUNTER — CLINICAL DOCUMENTATION (OUTPATIENT)
Dept: SPIRITUAL SERVICES | Age: 85
End: 2020-10-01

## 2020-10-01 ENCOUNTER — HOSPITAL ENCOUNTER (OUTPATIENT)
Dept: INFUSION THERAPY | Age: 85
Discharge: HOME | End: 2020-10-01
Payer: MEDICARE

## 2020-10-01 VITALS
TEMPERATURE: 98.3 F | OXYGEN SATURATION: 98 % | DIASTOLIC BLOOD PRESSURE: 55 MMHG | BODY MASS INDEX: 29.29 KG/M2 | SYSTOLIC BLOOD PRESSURE: 117 MMHG | HEIGHT: 67 IN | WEIGHT: 186.6 LBS | RESPIRATION RATE: 16 BRPM | HEART RATE: 79 BPM

## 2020-10-01 DIAGNOSIS — C61 PROSTATE CANCER, PRIMARY, WITH METASTASIS FROM PROSTATE TO OTHER SITE (HCC): Primary | ICD-10-CM

## 2020-10-01 LAB
ABSOLUTE EOS #: 0.3 K/UL (ref 0–0.44)
ABSOLUTE IMMATURE GRANULOCYTE: 0.04 K/UL (ref 0–0.3)
ABSOLUTE LYMPH #: 0.87 K/UL (ref 1.1–3.7)
ABSOLUTE MONO #: 0.53 K/UL (ref 0.1–1.2)
ALBUMIN SERPL-MCNC: 4.1 G/DL (ref 3.5–5.2)
ALBUMIN/GLOBULIN RATIO: 1.2 (ref 1–2.5)
ALP BLD-CCNC: 315 U/L (ref 40–129)
ALT SERPL-CCNC: 11 U/L (ref 5–41)
ANION GAP SERPL CALCULATED.3IONS-SCNC: 11 MMOL/L (ref 9–17)
AST SERPL-CCNC: 16 U/L
BASOPHILS # BLD: 0 % (ref 0–2)
BASOPHILS ABSOLUTE: <0.03 K/UL (ref 0–0.2)
BILIRUB SERPL-MCNC: 0.34 MG/DL (ref 0.3–1.2)
BUN BLDV-MCNC: 22 MG/DL (ref 8–23)
BUN/CREAT BLD: 17 (ref 9–20)
CALCIUM SERPL-MCNC: 9.1 MG/DL (ref 8.6–10.4)
CHLORIDE BLD-SCNC: 102 MMOL/L (ref 98–107)
CO2: 23 MMOL/L (ref 20–31)
CREAT SERPL-MCNC: 1.29 MG/DL (ref 0.7–1.2)
DIFFERENTIAL TYPE: ABNORMAL
EOSINOPHILS RELATIVE PERCENT: 3 % (ref 1–4)
GFR AFRICAN AMERICAN: >60 ML/MIN
GFR NON-AFRICAN AMERICAN: 53 ML/MIN
GFR SERPL CREATININE-BSD FRML MDRD: ABNORMAL ML/MIN/{1.73_M2}
GFR SERPL CREATININE-BSD FRML MDRD: ABNORMAL ML/MIN/{1.73_M2}
GLUCOSE BLD-MCNC: 113 MG/DL (ref 70–99)
HCT VFR BLD CALC: 32.1 % (ref 40.7–50.3)
HEMOGLOBIN: 9.9 G/DL (ref 13–17)
IMMATURE GRANULOCYTES: 0 %
LYMPHOCYTES # BLD: 10 % (ref 24–43)
MCH RBC QN AUTO: 28.9 PG (ref 25.2–33.5)
MCHC RBC AUTO-ENTMCNC: 30.8 G/DL (ref 25.2–33.5)
MCV RBC AUTO: 93.6 FL (ref 82.6–102.9)
MONOCYTES # BLD: 6 % (ref 3–12)
NRBC AUTOMATED: 0 PER 100 WBC
PDW BLD-RTO: 14.9 % (ref 11.8–14.4)
PLATELET # BLD: 212 K/UL (ref 138–453)
PLATELET ESTIMATE: ABNORMAL
PMV BLD AUTO: 8.8 FL (ref 8.1–13.5)
POTASSIUM SERPL-SCNC: 4.6 MMOL/L (ref 3.7–5.3)
RBC # BLD: 3.43 M/UL (ref 4.21–5.77)
RBC # BLD: ABNORMAL 10*6/UL
SEG NEUTROPHILS: 81 % (ref 36–65)
SEGMENTED NEUTROPHILS ABSOLUTE COUNT: 7.42 K/UL (ref 1.5–8.1)
SODIUM BLD-SCNC: 136 MMOL/L (ref 135–144)
TOTAL PROTEIN: 7.4 G/DL (ref 6.4–8.3)
WBC # BLD: 9.2 K/UL (ref 3.5–11.3)
WBC # BLD: ABNORMAL 10*3/UL

## 2020-10-01 PROCEDURE — 96365 THER/PROPH/DIAG IV INF INIT: CPT

## 2020-10-01 PROCEDURE — 6360000002 HC RX W HCPCS: Performed by: INTERNAL MEDICINE

## 2020-10-01 PROCEDURE — 96402 CHEMO HORMON ANTINEOPL SQ/IM: CPT

## 2020-10-01 PROCEDURE — 80053 COMPREHEN METABOLIC PANEL: CPT

## 2020-10-01 PROCEDURE — 2580000003 HC RX 258: Performed by: INTERNAL MEDICINE

## 2020-10-01 PROCEDURE — 85025 COMPLETE CBC W/AUTO DIFF WBC: CPT

## 2020-10-01 RX ORDER — SODIUM CHLORIDE 0.9 % (FLUSH) 0.9 %
10 SYRINGE (ML) INJECTION PRN
Status: CANCELLED | OUTPATIENT
Start: 2020-10-01

## 2020-10-01 RX ORDER — METHYLPREDNISOLONE SODIUM SUCCINATE 125 MG/2ML
125 INJECTION, POWDER, LYOPHILIZED, FOR SOLUTION INTRAMUSCULAR; INTRAVENOUS ONCE
Status: CANCELLED | OUTPATIENT
Start: 2020-10-01

## 2020-10-01 RX ORDER — SODIUM CHLORIDE 0.9 % (FLUSH) 0.9 %
5 SYRINGE (ML) INJECTION PRN
Status: CANCELLED | OUTPATIENT
Start: 2020-10-01

## 2020-10-01 RX ORDER — EPINEPHRINE 1 MG/ML
0.3 INJECTION, SOLUTION, CONCENTRATE INTRAVENOUS PRN
Status: CANCELLED | OUTPATIENT
Start: 2020-10-01

## 2020-10-01 RX ORDER — DIPHENHYDRAMINE HYDROCHLORIDE 50 MG/ML
50 INJECTION INTRAMUSCULAR; INTRAVENOUS ONCE
Status: CANCELLED | OUTPATIENT
Start: 2020-10-01

## 2020-10-01 RX ORDER — HEPARIN SODIUM (PORCINE) LOCK FLUSH IV SOLN 100 UNIT/ML 100 UNIT/ML
500 SOLUTION INTRAVENOUS PRN
Status: CANCELLED | OUTPATIENT
Start: 2020-10-01

## 2020-10-01 RX ORDER — SODIUM CHLORIDE 9 MG/ML
20 INJECTION, SOLUTION INTRAVENOUS ONCE
Status: DISCONTINUED | OUTPATIENT
Start: 2020-10-01 | End: 2020-10-02 | Stop reason: HOSPADM

## 2020-10-01 RX ORDER — SODIUM CHLORIDE 9 MG/ML
INJECTION, SOLUTION INTRAVENOUS CONTINUOUS
Status: CANCELLED | OUTPATIENT
Start: 2020-10-01

## 2020-10-01 RX ADMIN — SODIUM CHLORIDE 20 ML/HR: 9 INJECTION, SOLUTION INTRAVENOUS at 10:51

## 2020-10-01 RX ADMIN — LEUPROLIDE ACETATE 22.5 MG: KIT SUBCUTANEOUS at 12:03

## 2020-10-01 RX ADMIN — ZOLEDRONIC ACID 3 MG: 0.8 INJECTION, SOLUTION, CONCENTRATE INTRAVENOUS at 11:38

## 2020-10-01 NOTE — FLOWSHEET NOTE
10/01/20 1104   Encounter Summary   Services provided to: Patient and family together   Referral/Consult From: [address] [place] [address]; Children;Family members   Continue Visiting   (10/1/20)   Complexity of Encounter Moderate   Length of Encounter 15 minutes   Spiritual/Zoroastrianism   Type Spiritual support   Assessment Calm; Approachable   Intervention Active listening;Sustaining presence/ Ministry of presence   Outcome Expressed gratitude;Engaged in conversation

## 2020-10-01 NOTE — PROGRESS NOTES
rounding in infusion    Assessment: Patient is beginning his first infusion. His wife and daughter are with him for support. They all present as cheerful and hopeful.  explained that chaplains are available for spiritual support and encouraged them to use all of the support they need. Intervention: Engaged in conversation. Patient expressed appreciation for visit and offer of continued prayer. Plan: Chaplains are available on site or on call 24/7 for spiritual and emotional support.

## 2020-10-01 NOTE — PROGRESS NOTES
Patient, patients daughter and wife here for chemotherapy teaching. Spent 30 minutes with them   Teaching sheets from [facility] and verbal information given on chemotherapy agents, action, administration and side effects. Drugs discussed: Eligard, Zometa  Questions answered, support given, consents signed. Patient states that he does go to the dentist regularly. Patient currently taking vitamin D but suggested patient or family member calls family doctor about starting patient on calcium and vitamin D. Patient and daughter verbalized understanding.

## 2020-10-01 NOTE — PROGRESS NOTES
Zometa infused and eligard given. Patient tolerated both without difficulty,  IV d/c'd, coban to site. Patient discharged to home with family.

## 2020-10-02 ENCOUNTER — CARE COORDINATION (OUTPATIENT)
Dept: CASE MANAGEMENT | Age: 85
End: 2020-10-02

## 2020-10-02 NOTE — CARE COORDINATION
Attempted to reach pt for BPCI-A follow up call. Left message requesting call back.     Terrance Dougherty RN  Care Transition Coordinator  [phone]

## 2020-10-06 ENCOUNTER — TELEPHONE (OUTPATIENT)
Dept: NEPHROLOGY | Age: 85
End: 2020-10-06

## 2020-10-06 NOTE — TELEPHONE ENCOUNTER
Bella Lora called, said patient was in to see Dr. Tom Platt 9/23/20, said he was going to contact the cardiologist to see if he could restart the entresto, please call her back at [phone]

## 2020-10-09 ENCOUNTER — CARE COORDINATION (OUTPATIENT)
Dept: CASE MANAGEMENT | Age: 85
End: 2020-10-09

## 2020-10-09 NOTE — CARE COORDINATION
[facility] Transitions Follow Up Call    10/9/2020    Patient: Timothy Worthy  Patient : 1933   MRN: <[id number]>  Reason for Admission:   Discharge Date: 20 RARS: Readmission Risk Score: 26         Spoke with: Attempted to contact patient for follow up BPCI-A transition call. Left message on Voice mail to call office (number given) with any questions and an update on your condition since discharge. Will Follow up at later time. Sam Norton LPN     [facility] Transitions Subsequent and Final Call    Subsequent and Final Calls  Care Transitions Interventions  Other Interventions:             Follow Up  Future Appointments   Date Time Provider Gisela Hinkle   10/19/2020 11:40 AM MD SHANIKA Pérez [facility]   10/29/2020 11:30 AM [facility] MED ONC CHAIR 05 [facility] MED ONC [place]   2020  2:30 PM [facility]   [facility]   2020 11:30 AM [address] CHAIR 04 [id number] [facility] MED ONC [place]       Sam Norton LPN

## 2020-10-14 RX ORDER — BICALUTAMIDE 50 MG/1
TABLET, FILM COATED ORAL
Qty: 30 TABLET | Refills: 1 | Status: SHIPPED | OUTPATIENT
Start: 2020-10-14 | End: 2020-01-01

## 2020-10-19 ENCOUNTER — PROCEDURE VISIT (OUTPATIENT)
Dept: UROLOGY | Age: 85
End: 2020-10-19
Payer: MEDICARE

## 2020-10-19 ENCOUNTER — HOSPITAL ENCOUNTER (OUTPATIENT)
Age: 85
Setting detail: SPECIMEN
Discharge: HOME | End: 2020-10-19
Payer: MEDICARE

## 2020-10-19 VITALS
WEIGHT: 184.8 LBS | HEART RATE: 93 BPM | HEIGHT: 67 IN | OXYGEN SATURATION: 94 % | BODY MASS INDEX: 29 KG/M2 | DIASTOLIC BLOOD PRESSURE: 58 MMHG | SYSTOLIC BLOOD PRESSURE: 112 MMHG

## 2020-10-19 PROCEDURE — 52000 CYSTOURETHROSCOPY: CPT | Performed by: UROLOGY

## 2020-10-19 PROCEDURE — 87086 URINE CULTURE/COLONY COUNT: CPT

## 2020-10-19 NOTE — PROGRESS NOTES
Cystoscopy Operative Note    Patient:  Chiquita Severino  MRN: [id number]  YOB: 1933    Date: 10/19/20  Surgeon: Godfrey Sena MD  Anesthesia: Urethral 2% Xylocaine   Indications:     ADT/casodex and advanced prostate cancer meds per oncology. Seeing Dr Quezada Half hematuria secondary to catheter/bph/prostate cancer  Follow up for cystoscopy local and voiding trial in one month in office    Position: Supine    Findings:   The patient was prepped and draped in the usual sterile fashion. The flexible cystoscope was advanced through the urethra and into the bladder. The bladder was thoroughly inspected and the following was noted:    Residual Urine: Significant  Urethra: No abnormalities of the urethra are noted. Prostate: bladder neck open but still lateral lobe hypertrophy ++ near apical prostate. Still some obstructign adenoma  Bladder: No tumors or CIS noted. No bladder diverticulum. severe trabeculation. Catheter cystitis  Ureters: Clear efflux from both ureters. Orifices with normal configuration and location. The cystoscope was removed. The patient tolerated the procedure well.   Risks discussed  Will scheduled cysto greenlight (30) [place]  preop urine culture

## 2020-10-20 ENCOUNTER — TELEPHONE (OUTPATIENT)
Dept: UROLOGY | Age: 85
End: 2020-10-20

## 2020-10-20 ENCOUNTER — CARE COORDINATION (OUTPATIENT)
Dept: CASE MANAGEMENT | Age: 85
End: 2020-10-20

## 2020-10-20 NOTE — CARE COORDINATION
[facility] Transitions Follow Up Call    10/20/2020    Patient: Charu Conrad  Patient : 1933   MRN: <[id number]>  Reason for Admission:   Discharge Date: 20 RARS: Readmission Risk Score: 26       Attempted to reach patient by phone for follow up; BPCI-A. HIPAA compliant message left on patient's voicemail; CTN contact information provided.      Kinga Peraza RN

## 2020-10-20 NOTE — TELEPHONE ENCOUNTER
T/C from pts daughter Patito Harmon wanting to know when pts surgery is scheduled. Please call her back.

## 2020-10-21 LAB
CULTURE: NORMAL
Lab: NORMAL
SPECIMEN DESCRIPTION: NORMAL

## 2020-10-29 ENCOUNTER — HOSPITAL ENCOUNTER (OUTPATIENT)
Dept: INFUSION THERAPY | Age: 85
Discharge: HOME | End: 2020-10-29
Payer: MEDICARE

## 2020-10-29 VITALS
OXYGEN SATURATION: 96 % | RESPIRATION RATE: 16 BRPM | DIASTOLIC BLOOD PRESSURE: 59 MMHG | HEART RATE: 84 BPM | SYSTOLIC BLOOD PRESSURE: 116 MMHG | TEMPERATURE: 98.2 F

## 2020-10-29 DIAGNOSIS — C61 PROSTATE CANCER, PRIMARY, WITH METASTASIS FROM PROSTATE TO OTHER SITE (HCC): Primary | ICD-10-CM

## 2020-10-29 PROCEDURE — 2580000003 HC RX 258: Performed by: INTERNAL MEDICINE

## 2020-10-29 PROCEDURE — 96365 THER/PROPH/DIAG IV INF INIT: CPT

## 2020-10-29 PROCEDURE — 6360000002 HC RX W HCPCS: Performed by: INTERNAL MEDICINE

## 2020-10-29 RX ORDER — SODIUM CHLORIDE 0.9 % (FLUSH) 0.9 %
10 SYRINGE (ML) INJECTION PRN
Status: CANCELLED | OUTPATIENT
Start: 2020-10-29

## 2020-10-29 RX ORDER — DIPHENHYDRAMINE HYDROCHLORIDE 50 MG/ML
50 INJECTION INTRAMUSCULAR; INTRAVENOUS ONCE
Status: CANCELLED | OUTPATIENT
Start: 2020-10-29

## 2020-10-29 RX ORDER — SODIUM CHLORIDE 9 MG/ML
20 INJECTION, SOLUTION INTRAVENOUS ONCE
Status: CANCELLED | OUTPATIENT
Start: 2020-01-01

## 2020-10-29 RX ORDER — METHYLPREDNISOLONE SODIUM SUCCINATE 125 MG/2ML
125 INJECTION, POWDER, LYOPHILIZED, FOR SOLUTION INTRAMUSCULAR; INTRAVENOUS ONCE
Status: CANCELLED | OUTPATIENT
Start: 2020-10-29

## 2020-10-29 RX ORDER — HEPARIN SODIUM (PORCINE) LOCK FLUSH IV SOLN 100 UNIT/ML 100 UNIT/ML
500 SOLUTION INTRAVENOUS PRN
Status: CANCELLED | OUTPATIENT
Start: 2020-10-29

## 2020-10-29 RX ORDER — SODIUM CHLORIDE 9 MG/ML
INJECTION, SOLUTION INTRAVENOUS CONTINUOUS
Status: CANCELLED | OUTPATIENT
Start: 2020-01-01

## 2020-10-29 RX ORDER — HEPARIN SODIUM (PORCINE) LOCK FLUSH IV SOLN 100 UNIT/ML 100 UNIT/ML
500 SOLUTION INTRAVENOUS PRN
Status: CANCELLED | OUTPATIENT
Start: 2020-01-01

## 2020-10-29 RX ORDER — SODIUM CHLORIDE 0.9 % (FLUSH) 0.9 %
5 SYRINGE (ML) INJECTION PRN
Status: CANCELLED | OUTPATIENT
Start: 2020-01-01

## 2020-10-29 RX ORDER — SODIUM CHLORIDE 9 MG/ML
INJECTION, SOLUTION INTRAVENOUS CONTINUOUS
Status: CANCELLED | OUTPATIENT
Start: 2020-10-29

## 2020-10-29 RX ORDER — EPINEPHRINE 1 MG/ML
0.3 INJECTION, SOLUTION, CONCENTRATE INTRAVENOUS PRN
Status: CANCELLED | OUTPATIENT
Start: 2020-01-01

## 2020-10-29 RX ORDER — EPINEPHRINE 1 MG/ML
0.3 INJECTION, SOLUTION, CONCENTRATE INTRAVENOUS PRN
Status: CANCELLED | OUTPATIENT
Start: 2020-10-29

## 2020-10-29 RX ORDER — DIPHENHYDRAMINE HYDROCHLORIDE 50 MG/ML
50 INJECTION INTRAMUSCULAR; INTRAVENOUS ONCE
Status: CANCELLED | OUTPATIENT
Start: 2020-01-01

## 2020-10-29 RX ORDER — METHYLPREDNISOLONE SODIUM SUCCINATE 125 MG/2ML
125 INJECTION, POWDER, LYOPHILIZED, FOR SOLUTION INTRAMUSCULAR; INTRAVENOUS ONCE
Status: CANCELLED | OUTPATIENT
Start: 2020-01-01

## 2020-10-29 RX ORDER — SODIUM CHLORIDE 0.9 % (FLUSH) 0.9 %
10 SYRINGE (ML) INJECTION PRN
Status: CANCELLED | OUTPATIENT
Start: 2020-01-01

## 2020-10-29 RX ORDER — SODIUM CHLORIDE 9 MG/ML
20 INJECTION, SOLUTION INTRAVENOUS ONCE
Status: COMPLETED | OUTPATIENT
Start: 2020-10-29 | End: 2020-10-29

## 2020-10-29 RX ORDER — SODIUM CHLORIDE 0.9 % (FLUSH) 0.9 %
5 SYRINGE (ML) INJECTION PRN
Status: CANCELLED | OUTPATIENT
Start: 2020-10-29

## 2020-10-29 RX ADMIN — SODIUM CHLORIDE 20 ML/HR: 9 INJECTION, SOLUTION INTRAVENOUS at 11:29

## 2020-10-29 RX ADMIN — ZOLEDRONIC ACID 3 MG: 4 INJECTION, SOLUTION, CONCENTRATE INTRAVENOUS at 11:43

## 2020-10-29 NOTE — PROGRESS NOTES
Zometa infused. IV flushed with 20 ml NSS and d/c'd, coban to site. Patient discharged to home with family.

## 2020-11-02 ENCOUNTER — OFFICE VISIT (OUTPATIENT)
Dept: ONCOLOGY | Age: 85
End: 2020-11-02
Payer: MEDICARE

## 2020-11-02 ENCOUNTER — TELEPHONE (OUTPATIENT)
Dept: PREADMISSION TESTING | Age: 85
End: 2020-11-02

## 2020-11-02 VITALS
DIASTOLIC BLOOD PRESSURE: 64 MMHG | WEIGHT: 186 LBS | SYSTOLIC BLOOD PRESSURE: 128 MMHG | HEIGHT: 67 IN | BODY MASS INDEX: 29.19 KG/M2 | OXYGEN SATURATION: 95 % | TEMPERATURE: 96.9 F | HEART RATE: 110 BPM

## 2020-11-02 PROCEDURE — 99215 OFFICE O/P EST HI 40 MIN: CPT | Performed by: INTERNAL MEDICINE

## 2020-11-02 PROCEDURE — G8427 DOCREV CUR MEDS BY ELIG CLIN: HCPCS | Performed by: INTERNAL MEDICINE

## 2020-11-02 PROCEDURE — 99213 OFFICE O/P EST LOW 20 MIN: CPT | Performed by: INTERNAL MEDICINE

## 2020-11-02 PROCEDURE — 4040F PNEUMOC VAC/ADMIN/RCVD: CPT | Performed by: INTERNAL MEDICINE

## 2020-11-02 PROCEDURE — 1123F ACP DISCUSS/DSCN MKR DOCD: CPT | Performed by: INTERNAL MEDICINE

## 2020-11-02 PROCEDURE — G8484 FLU IMMUNIZE NO ADMIN: HCPCS | Performed by: INTERNAL MEDICINE

## 2020-11-02 PROCEDURE — 1036F TOBACCO NON-USER: CPT | Performed by: INTERNAL MEDICINE

## 2020-11-02 PROCEDURE — G8417 CALC BMI ABV UP PARAM F/U: HCPCS | Performed by: INTERNAL MEDICINE

## 2020-11-02 RX ORDER — M-VIT,TX,IRON,MINS/CALC/FOLIC 27MG-0.4MG
1 TABLET ORAL DAILY
COMMUNITY

## 2020-11-02 NOTE — PROGRESS NOTES
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 He has tolerated Lupron well. He complains of not feeling well after getting Zometa infusion. He is planning to have a cystoscopy with urologist.    During this visit patient's allergy, social, medical, surgical history and medications were reviewed and updated. HISTORY OF PRESENT ILLNESS:    This is a 77-year-old male who was admitted from [facility] for complaints of worsening shortness of breath and swelling in lower extremities. Patient has history of CHF and been following with cardiologist.  Patient was also noted to have acute kidney injury on top of chronic kidney disease and being managed for acute exacerbation of systolic heart failure. Patient was noted to have high PSA of 97.6 and also sclerotic lesion in L1 vertebral body and right iliac bone suspicious for metastatic disease. Oncology consult for further opinion. Past Medical History:   has a past medical history of AAA (abdominal aortic aneurysm) ([address] BPH (benign prostatic hyperplasia), CAD (coronary artery disease), GIB (gastrointestinal bleeding), Hyperlipidemia, Skin cancer, basal cell, Stroke (cerebrum) ([address] and Systolic heart failure ([address] Past Surgical History:   has a past surgical history that includes Coronary artery bypass graft; Vocal Cord Surgery; Skin cancer excision; Eye surgery; Prostate biopsy (08/31/2020); and Prostate biopsy (N/A, 8/31/2020).      Medications:    Current Outpatient Medications   Medication Sig Dispense Refill    Multiple Vitamins-Minerals (THERAPEUTIC MULTIVITAMIN-MINERALS) tablet Take 1 vomiting, diarrhea, constipation, abdominal pain, Dysphagia, hematemesis and hematochezia   Genitourinary: negative for frequency, dysuria, nocturia, urinary incontinence, and hematuria   Integument: negative for rash, skin lesions, bruises. Hematologic/Lymphatic: negative for easy bruising, bleeding, lymphadenopathy, or petechiae   Endocrine: negative for heat or cold intolerance,weight changes, change in bowel habits and hair loss   Musculoskeletal: negative for myalgias, arthralgias, pain, joint swelling,and bone pain   Neurological: negative for headaches, dizziness, seizures, weakness, numbness    PHYSICAL EXAM:      /64 (Site: Left Upper Arm, Position: Sitting, Cuff Size: Medium Adult)   Pulse 110   Temp 96.9 °F (36.1 °C)   Ht 5' 7\" (1.702 m)   Wt 186 lb (84.4 kg)   SpO2 95%   BMI 29.13 kg/m²    Temp (24hrs), Av.5 °F (36.9 °C), Min:98.1 °F (36.7 °C), Max:98.8 °F (37.1 °C)    General appearance - well appearing, no in pain or distress   Mental status - alert and cooperative   Eyes - pupils equal and reactive, extraocular eye movements intact   Ears - bilateral TM's and external ear canals normal   Mouth - mucous membranes moist, pharynx normal without lesions   Neck - supple, no significant adenopathy   Lymphatics - no palpable lymphadenopathy, no hepatosplenomegaly   Chest - clear to auscultation, no wheezes, rales or rhonchi, symmetric air entry   Heart - normal rate, regular rhythm, normal S1, S2, no murmurs  Abdomen - soft, nontender, nondistended, no masses or organomegaly   Neurological - alert, oriented, normal speech, no focal findings or movement disorder noted   Musculoskeletal - no joint tenderness, deformity or swelling   Extremities - peripheral pulses normal, no pedal edema, no clubbing or cyanosis   Skin - normal coloration and turgor, no rashes, no suspicious skin lesions noted ,    DATA:    Labs:   CBC:   No results for input(s): WBC, HGB, HCT, PLT in the last 72 hours.   BMP: No results for input(s): NA, K, CO2, BUN, CREATININE, LABGLOM, GLUCOSE in the last 72 hours. PT/INR: No results for input(s): PROTIME, INR in the last 72 hours. PATHOLOGY DATA  Surgical Pathology Report   Surgical Pathology   Collected:  08/31/20 1530    Lab status:  Final    Resulting lab:  [facility]    Value:  -- Diagnosis --   1.  PROSTATE, LLB, NEEDLE CORE BIOPSY:             -  ADENOCARCINOMA WITH ACINAR AND DUCTAL FEATURES, RAISSA   SCORE 4+4 = 8 (WHO/ISUP GRADE GROUP 4), INVOLVING 1 OF 1 CORE, 1.9 MM   DISCONTIGUOUS LENGTH, 24% TOTAL BIOPSY VOLUME.        -  PERINEURAL INVASION PRESENT. 2.  PROSTATE, LB, NEEDLE CORE BIOPSY:             -  ADENOCARCINOMA WITH ACINAR AND DUCTAL FEATURES, RAISSA   SCORE 4+3 = 7 (WHO/ISUP GRADE GROUP 3), INVOLVING 1 OF 1 CORE, 8.6 MM   DISCONTIGUOUS LENGTH 66% OF TOTAL BIOPSY VOLUME.        -  INTRADUCTAL CARCINOMA COMPONENT PRESENT. 3.  PROSTATE, LLM, NEEDLE CORE BIOPSY:             -  ADENOCARCINOMA WITH ACINAR AND DUCTAL FEATURES, RAISSA   SCORE 4+4 = 8 (WHO/ISUP GRADE GROUP 4), INVOLVING 1 OF 1 CORE, 7.9 MM   DISCONTIGUOUS LENGTH, 56% OF TOTAL BIOPSY VOLUME.        -  INTRADUCTAL CARCINOMA COMPONENT PRESENT.        -  PERINEURAL INVASION PRESENT.         4.  PROSTATE, LM, NEEDLE CORE BIOPSY:             -  ADENOCARCINOMA, ACINAR TYPE, RAISSA SCORE 4+3 = 7   (WHO/ISUP GRADE GROUP 3), INVOLVING 1 OF 1 CORE, 11.7 MM DISCONTIGUOUS   LENGTH, 78% OF TOTAL BIOPSY VOLUME.        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 5.  PROSTATE, LLA, NEEDLE CORE BIOPSY:             -  ADENOCARCINOMA WITH ACINAR AND DUCTAL FEATURES, RAISSA   SCORE 4+3 = 7 (WHO/ISUP GRADE GROUP 3), INVOLVING 1 OF 1 CORE, 1.7 MM   DISCONTIGUOUS LENGTH, 11% OF TOTAL BIOPSY VOLUME.        -  INTRADUCTAL CARCINOMA COMPONENT PRESENT.      6.  PROSTATE, LA, NEEDLE CORE BIOPSY:             -  ADENOCARCINOMA, ACINAR TYPE, RAISSA SCORE 4+3 = 7   (WHO/ISUP GRADE disease within the spine and posterior    pelvis.  Question small lesions within the ribs. CT chest abdomen pelvis 8/31/2020: Impression    Chest:         1. Osteopenia with superimposed diffuse degenerative changes could obscure    subtle metastatic disease. 2. Subtle sclerotic foci in T1 and T2 vertebral bodies likely metastatic. 3. No metastatic nodules. 4. Small bilateral pleural effusions right greater than left similar to prior. Abdomen pelvis:         1. Interval catheterization of the urinary bladder.  Thickened bladder wall    with new marked perivesical stranding.  Suspect cystitis. 2. Mild bilateral hydronephrosis slightly improved from prior but bilateral    hydroureter is unchanged. 3. A 4.4 cm infrarenal abdominal aortic aneurysm. IMPRESSION:   1. Metastatic prostate cancer: Elevated PSA with bony lesions suspicious for prostate cancer  2. Obstructive uropathy with bilateral ureteral hydronephrosis  3. CHF  4. ASHLEY/CKD  5. Bone lesions  6. Anemia: Status post IV iron infusion in the hospital  7. Mild thrombocytopenia    RECOMMENDATIONS:  1. I reviewed the laboratory data, imaging studies, diagnosis, prognosis and treatment options with patient  2. Patient's bone scan is consistent with metastatic disease in the spine and posterior pelvis  3. I had discussion with patient and his daughter Kei Kennedy. I explained that prostate cancer is slow-growing and even with metastatic disease can be controlled with androgen depression therapy which is much tolerable  4. Continue Lupron  5. Patient will also be started on Zometa as a supportive therapy  6. We will check CBC, CMP and PSA level with each treatment  7. I will check iron studies with his next lab work. Currently he is on iron pills twice daily iron stores  8. Return to clinic in 3 months  9. Patient and family agreeable    Mykel Awan MD  Hematologist/Medical Oncologist    This note is created with the assistance of ximena speech recognition program.  While intending to generate a document that actually reflects the content of the visit, the document can still have some errors including those of syntax and sound a like substitutions which may escape proof reading. It such instances, actual meaning can be extrapolated by contextual diversion.

## 2020-11-03 ENCOUNTER — CARE COORDINATION (OUTPATIENT)
Dept: CASE MANAGEMENT | Age: 85
End: 2020-11-03

## 2020-11-03 ENCOUNTER — PRE-PROCEDURE TELEPHONE (OUTPATIENT)
Dept: PREADMISSION TESTING | Age: 85
End: 2020-11-03

## 2020-11-03 PROBLEM — N17.9 ACUTE RENAL FAILURE (HCC): Status: RESOLVED | Noted: 2020-08-28 | Resolved: 2020-11-03

## 2020-11-03 NOTE — CARE COORDINATION
[facility] Transitions Follow Up Call    11/3/2020    Patient: Duran Ge  Patient : 1933   MRN: <[id number]>  Reason for Admission:   Discharge Date: 20 RARS: Readmission Risk Score: 26        Attempted to reach patient by phone for follow up; BPCI-A. HIPAA compliant message left on patient's voicemail; CTN contact information provided.      Bladimir Gauthier RN

## 2020-11-09 ENCOUNTER — HOSPITAL ENCOUNTER (OUTPATIENT)
Dept: PREADMISSION TESTING | Age: 85
Setting detail: SPECIMEN
Discharge: HOME | End: 2020-11-13
Payer: MEDICARE

## 2020-11-09 PROCEDURE — U0003 INFECTIOUS AGENT DETECTION BY NUCLEIC ACID (DNA OR RNA); SEVERE ACUTE RESPIRATORY SYNDROME CORONAVIRUS 2 (SARS-COV-2) (CORONAVIRUS DISEASE [COVID-19]), AMPLIFIED PROBE TECHNIQUE, MAKING USE OF HIGH THROUGHPUT TECHNOLOGIES AS DESCRIBED BY CMS-2020-01-R: HCPCS

## 2020-11-10 ENCOUNTER — CARE COORDINATION (OUTPATIENT)
Dept: CASE MANAGEMENT | Age: 85
End: 2020-11-10

## 2020-11-10 LAB — SARS-COV-2, NAA: DETECTED

## 2020-11-10 NOTE — CARE COORDINATION
[facility] Transitions Follow Up Call    11/10/2020    Patient: Ruthy Cross  Patient : 1933   MRN: [id number]  Reason for Admission:   Discharge Date: 20 RARS: Readmission Risk Score: 26    Follow Up: Attempted to contact patient for BPCI-A follow up. Unable to reach patient. Left message with contact information and request for call back.       Future Appointments   Date Time Provider Gisela Hinkle   2020 10:00 AM [facility] MED ONC CHAIR 01 MD MED ONC [place]   2020 11:10 AM Kunal Zacarias MD [facility]   2020 11:30 AM [facility] MED ONC CHAIR 04 MD MED ONC [place]   2/15/2021  1:30 PM [facility]       Royer Martinez RN

## 2020-11-11 ENCOUNTER — TELEPHONE (OUTPATIENT)
Dept: UROLOGY | Age: 85
End: 2020-11-11

## 2020-11-11 ENCOUNTER — TELEPHONE (OUTPATIENT)
Dept: ADMINISTRATIVE | Age: 85
End: 2020-11-11

## 2020-11-13 ENCOUNTER — TELEPHONE (OUTPATIENT)
Dept: UROLOGY | Age: 85
End: 2020-11-13

## 2020-11-13 NOTE — TELEPHONE ENCOUNTER
Patient's daughter, Thai Barreto, called the office with questions about patient's catheter. Please call her back at [phone].

## 2020-11-13 NOTE — TELEPHONE ENCOUNTER
Spoke with Marjorie. States Mohan Corrigan is out of quarantine on 11/15/2020. I will nhaun. Patients greenlight at [facility]. V's and then schedule nurse visit for cath change and pre-op urine.

## 2020-11-19 NOTE — CARE COORDINATION
[facility] Transitions Follow Up Call    2020    Patient: Win Hansen  Patient : 1933   MRN: <[id number]>  Reason for Admission:   Discharge Date: 20 RARS: Readmission Risk Score: 26         Spoke briefly with: Patient's daughter/caregiver, Marjorie    Care Transitions Subsequent and Final Call    Subsequent and Final Calls  Do you have any ongoing symptoms?:  No  Do you have any questions related to your medications?:  No  Do you currently have any active services?:  Yes  Are you currently active with any services?:  Home Health  -services continue  Do you have any needs or concerns that I can assist you with?:  No  Care Transitions Interventions  No Identified Needs    Gali Morning is pleasant in conversation and provides patient update.   -payne catheter draining clear/yellow urine  -payne catheter change next week   -denies patient with fever/chills  -denies patient with any weight gain  -patient scheduled for Prostate surgery 2020      Emotional support provided; discussed will continue to follow.        Follow Up  Future Appointments   Date Time Provider Gisela Hinkle   2020 10:00 AM MD MED ONC CHAIR 01 [facility] ON[facility] [place]   2020  2:30 PM SCHEDULE, [address] [facility]   2020  1:30 PM SCHEDULE, [facility] UROLOGY SHANIKA DPP   2020 11:10 AM MD [facility]   2020 11:30 AM [facility] MED ONC CHAIR 04 [facility] MED ONC [place]   [facility]  [facility]       Jimmy Garcia RN

## 2020-11-25 NOTE — PROGRESS NOTES
Following Dr. Johnson Cortes plan of care:    Changed 16 FR catheter changed without difficulty. Once balloon was deflated, removed catheter in total without difficulty. Patient's penis was cleansed with betadine. 2016 [facility] payne was inserted without difficulty. Upon urine return, balloon inflated with 10cc sterile water. Payne catheter was hooked up to leg bag with straps. Patient instructed on catheter care including draining catheter bag and keeping catheter bag above the knee to prevent pulling on catheter causing blood.

## 2020-12-01 NOTE — CARE COORDINATION
[facility] Transitions Follow Up Call    2020    Patient: Aamir Nolasco  Patient : 1933   MRN: <[id number]>  Reason for Admission:   Discharge Date: 20 RARS: Readmission Risk Score: 26       Attempted to reach patient by phone for follow up; BPCI-A. HIPAA compliant message left on patient's voicemail; CTN contact information provided. Final call; no further outreach.        Alfie Simon RN

## 2020-12-11 NOTE — PROGRESS NOTES
DC instructions and scripts for HYDROcodone-acetaminophen and   levoFLOXacin given to patient and daughter. No further questions. Vitals stable.

## 2020-12-11 NOTE — H&P
Pinky David MD, Michelle Plasencia MD, Manjeet Mccullough MD, Janine Paulino MD, Justin Vivar MD, Trenton Magallon MD, & Javi Liao MD    Urology History & Physical      Patient:  Charito Rashid  MRN: [id number]  YOB: 1933    CHIEF COMPLAINT: BPH    HISTORY OF PRESENT ILLNESS:   Charito Rashid is a 80 y.o. male who presents with BPH. Here for a greenlight photo vaporization of the prostate. Patient's old records, notes and chart reviewed and summarized above. Past Medical History:    Past Medical History:   Diagnosis Date    AAA (abdominal aortic aneurysm) ([facility] [address]     BPH (benign prostatic hyperplasia)     CAD (coronary artery disease)     GIB (gastrointestinal bleeding)     found to have a bleeding spot in the duodenum cauterized and on iron replacement therapy continue. hb 10.2 in 7/2018 should recheck cbc with dr Palma Villeda.      Hyperlipidemia     Skin cancer, basal cell     Stroke (cerebrum) (HCC)     Systolic heart failure (HCC)        Past Surgical History:    Past Surgical History:   Procedure Laterality Date    CORONARY ARTERY BYPASS GRAFT      cabg x 4 in 2000 in  [facility] BIOPSY  08/31/2020    PROSTATE BIOPSY N/A 8/31/2020    ** ADD ON - PROSTATE BIOPSY WITH ULTRASOUND ([address] NOTIFIED DEPT) performed by Trenton Magallon MD at [address] [place] [address]      Biopsy       Medications:      Current Facility-Administered Medications:     levoFLOXacin (LEVAQUIN) 500 MG/100ML infusion 500 mg, 500 mg, Intravenous, On Call to Cindy Hendrickson MD    Allergies:  No Known Allergies    Social History:   Social History     Socioeconomic History    Marital status:      Spouse name: Not on file    Number of children: Not on file    Years of education: Not on file    Highest education level: Not on file   Occupational History    Not on file   Social Needs    Financial resource strain: Not on file    Food insecurity Worry: Not on file     Inability: Not on file    Transportation needs     Medical: Not on file     Non-medical: Not on file   Tobacco Use    Smoking status: Former Smoker     Packs/day: 0.50     Years: 15.00     Pack years: 7.50     Types: Cigarettes     Start date: 1953     Last attempt to quit: 1966     Years since quittin.5    Smokeless tobacco: Never Used   Substance and Sexual Activity    Alcohol use: Yes     Frequency: 4 or more times a week     Drinks per session: 1 or 2     Binge frequency: Never    Drug use: Never    Sexual activity: Not Currently     Partners: Female   Lifestyle    Physical activity     Days per week: Not on file     Minutes per session: Not on file    Stress: Not on file   Relationships    Social connections     Talks on phone: Not on file     Gets together: Not on file     Attends Yarsanism service: Not on file     Active member of club or organization: Not on file     Attends meetings of clubs or organizations: Not on file     Relationship status: Not on file    Intimate partner violence     Fear of current or ex partner: Not on file     Emotionally abused: Not on file     Physically abused: Not on file     Forced sexual activity: Not on file   Other Topics Concern    Not on file   Social History Narrative    Not on file       Family History:    Family History   Problem Relation Age of Onset    Heart Disease Mother     Hypertension Mother     Heart Disease Father     Heart Disease Brother        REVIEW OF SYSTEMS:  A comprehensive 14 point review of systems was obtained. Constitutional: No fatigue  Eyes: No blurry vision  Ears, nose, mouth, throat, face: No ringing in the ears; no facial droop. Respiratory: No cough or cold. Cardiovascular: No palpitations  Gastrointestinal: No diarrhea or constipation.   Genitourinary: No burning with urination  Integument/Skin: No rashes  Hematologic/Lymphatic: No easy bruising  Musculoskeletal: No muscle pains  Neurologic: No weakness in the extremities. Psychiatric: No depression or suicidal thoughts. Endocrine: No heat or cold intolerances. Allergic/Immunologic: No current seasonal allergies; no skin hives. Physical Exam:    There were no vitals filed for this visit. Constitutional: Patient in no acute distress. Neuro: alert and oriented to person place and time. Head: Atraumatic and normocephalic. Neck: Trachea midline   Ext: 2+ radial pulses bilaterally. Psych: Mood and affect normal.  Skin: No rashes or bruising present. Lungs: Respiratory effort normal.  Cardiovascular:  Regular rhythm. Abdomen: Soft, non-tender, non-distended. No results for input(s): WBC, HGB, HCT, MCV, PLT in the last 72 hours. No results for input(s): NA, K, CL, CO2, PHOS, BUN, CREATININE in the last 72 hours. Invalid input(s): CA    No results for input(s): COLORU, PHUR, LABCAST, WBCUA, RBCUA, MUCUS, TRICHOMONAS, YEAST, BACTERIA, CLARITYU, SPECGRAV, LEUKOCYTESUR, UROBILINOGEN, Alida Hurst in the last 72 hours. Invalid input(s): NITRATE, GLUCOSEUKETONESUAMORPHOUS    Culture Results:        -----------------------------------------------------------------  Imaging Results:  No results found. Assessment and Plan   Assessment:  Yessi Andrew is a 80 y.o. male who presents with:  BPH      Plan:   OR for greenlight PVP    The patient was counseled at length about the risks of leia Covid-19 during their perioperative period and any recovery window from their procedure. The patient was made aware that leia Covid-19  may worsen their prognosis for recovering from their procedure  and lend to a higher morbidity and/or mortality risk. All material risks, benefits, and reasonable alternatives including postponing the procedure were discussed. The patient does wish to proceed with the procedure at this time.           Emile Fragoso MD  PGY-4, [address] Department of Urology 10:44 AM 12/11/2020

## 2020-12-11 NOTE — OP NOTE
Dr. Rubia Vasquez MD    LOCATION:  [place] [address]    DATE:  12/11/20    SURGEON:  Surgeon(s):  Rubia Vasquez MD     ASSISTANTS:  Shiela Bowden MD    PREOPERATIVE DIAGNOSIS:  BPH/outflow obstruction      POSTOPERATIVE DIAGNOSIS:  BPH/outflow obstruction      PROCEDURES:  1. Cystoscopy. 2. Transurethral GreenLight photovaporization of the prostate. ANESTHESIA:  General.     COMPLICATIONS:  None. DRAINS:  22 Slov[place] 3-way Panchal catheter. SPECIMEN:  None. ESTIMATED BLOOD LOSS:  20mL    POWER SETTINGS:  80-180W    INDICATIONS:  The patient is a 80 y.o. male with a history of benign prostatic hyperplasia resulting in bladder outlet obstruction. Treatment options were discussed and he elected to undergo the abovementioned procedure. Risks, benefits, alternatives, goals and possible complications of the procedure were explained to the patient. Informed consent was obtained.      DETAILS OF THE PROCEDURE: The patient was brought back to the operating room, and general endotracheal anesthesia was administered. The patient was placed in the dorsal lithotomy position. EPC cuffs were placed on and functioning. Antibiotics were administered in the form of Levaquin 500 mg IV. He was prepped and draped in the usual sterile fashion. A timeout was performed. The visual obturator was inserted into the patient's urethra and into the bladder. There was a median lobe present. The ureteral orifices were visualized as well as the verumontanum. The greenlight scope was inserted and the bladder neck was resected at 80 arizmendi taking care to avoid the ureteral orifices. The adenomatous tissue was then resected between the bladder neck and the apex from 3:00 to 9:00. The apex was resected to the level of the veru taking care to avoid the sphincter. The anterior portion was also resected. The power was varied between 80 and 180 Arizmendi where appropriate, and the adenomatous tissue was vaporized to the level of the capsule. The prostatic urethra was at this point patent. The ureteral orifices and the verumontanum were found to be intact. Hemostasis was observed. The scope was removed and a 22 [place] 3-way Payne catheter and hooked it up to irrigation and the balloon was inflated with 30 mL of sterile water. The patient tolerated the procedure well and was sent to PACU for postoperative monitoring. Dr. Ayan Ulrich was present and scrubbed for all critical portions of the procedure. DISPOSITION:   The patient was monitored in PACU where irrigation was weaned to clear. He was discharged home with the catheter in place and was given appropriate payne care instructions.  He will follow up with Dr. Ayan Ulrich  in 3 days for a void trial.

## 2020-12-11 NOTE — ANESTHESIA PRE PROCEDURE
Department of Anesthesiology  Preprocedure Note       Name:  Arcelia Gan   Age:  80 y.o.  :  1933                                          MRN:  [id number]         Date:  2020      Surgeon: Adore Talavera):  Natty Sullivan MD    Procedure: Procedure(s):  Dao Watts # [id number] GUSTAVO    Medications prior to admission:   Prior to Admission medications    Medication Sig Start Date End Date Taking? Authorizing Provider   ferrous sulfate (IRON 325) 325 (65 Fe) MG tablet Take 1 tablet by mouth 2 times daily 20  Yes Steff Ponce MD   sacubitril-valsartan (ENTRESTO) 24-26 MG per tablet Take 1 tablet by mouth 2 times daily 10/16/20  Yes Historical Provider, MD   bicalutamide (CASODEX) 50 MG chemo tablet TAKE 1 TABLET BY MOUTH EVERY DAY 10/14/20  Yes Natty Sullivan MD   midodrine (PROAMATINE) 5 MG tablet Take 10 mg by mouth nightly   Yes Historical Provider, MD   metoprolol succinate (TOPROL XL) 25 MG extended release tablet Take 1 tablet by mouth daily Replaces carvedilol.  Hold for systolic BP less than 939 or Heart rate less than 55 20  Yes Ely Garcia MD   potassium chloride (KLOR-CON) 10 MEQ extended release tablet Take 1 tablet by mouth daily Dose decreased to 10meq 20  Yes Ely Garcia MD   tamsulosin (FLOMAX) 0.4 MG capsule Take 0.4 mg by mouth daily   Yes Historical Provider, MD   rosuvastatin (CRESTOR) 10 MG tablet Take 10 mg by mouth daily   Yes Historical Provider, MD   vitamin D3 (CHOLECALCIFEROL) 25 MCG (1000 UT) TABS tablet Take 2 tablets by mouth daily 20   Steff Ponce MD   Multiple Vitamins-Minerals (THERAPEUTIC MULTIVITAMIN-MINERALS) tablet Take 1 tablet by mouth daily    Historical Provider, MD   aspirin 81 MG EC tablet Take 81 mg by mouth daily    Historical Provider, MD       Current medications:    Current Facility-Administered Medications   Medication Dose Route Frequency Provider Last Rate Last Dose    levoFLOXacin (LEVAQUIN) 500 MG/100ML History:    Social History     Tobacco Use    Smoking status: Former Smoker     Packs/day: 0.50     Years: 15.00     Pack years: 7.50     Types: Cigarettes     Start date: 1953     Last attempt to quit: 1966     Years since quittin.5    Smokeless tobacco: Never Used   Substance Use Topics    Alcohol use: Yes     Frequency: 4 or more times a week     Drinks per session: 1 or 2     Binge frequency: Never                                Counseling given: Not Answered      Vital Signs (Current):   Vitals:    20 1112   BP: 129/66   Pulse: 87   Resp: 20   Temp: 96.4 °F (35.8 °C)   TempSrc: Temporal   SpO2: 100%   Weight: 185 lb (83.9 kg)   Height: 5' 7\" (1.702 m)                                              BP Readings from Last 3 Encounters:   20 129/66   20 (!) 100/53   20 128/64       NPO Status: Time of last liquid consumption:                         Time of last solid consumption:                         Date of last liquid consumption: 12/10/20                        Date of last solid food consumption: 12/10/20    BMI:   Wt Readings from Last 3 Encounters:   20 185 lb (83.9 kg)   20 186 lb (84.4 kg)   10/19/20 184 lb 12.8 oz (83.8 kg)     Body mass index is 28.98 kg/m².     CBC:   Lab Results   Component Value Date    WBC 6.4 2020    RBC 3.80 2020    HGB 10.4 2020    HCT 34.0 2020    MCV 89.5 2020    RDW 16.5 2020     2020       CMP:   Lab Results   Component Value Date     2020    K 5.1 2020     2020    CO2 22 2020    BUN 27 2020    CREATININE 1.58 2020    GFRAA 51 2020    LABGLOM 42 2020    GLUCOSE 113 10/01/2020    PROT 8.2 2020    CALCIUM 9.7 2020    BILITOT 0.33 2020    ALKPHOS 136 2020    AST 24 2020    ALT 11 2020       POC Tests:   Recent Labs     20  1124   POCK 4.0       Coags: No results found for: PROTIME, INR, APTT    HCG (If Applicable): No results found for: PREGTESTUR, PREGSERUM, HCG, HCGQUANT     ABGs: No results found for: PHART, PO2ART, NUQ5DEP, FID3PQG, BEART, V6TNKNUC     Type & Screen (If Applicable):  No results found for: LABABO, LABRH    Drug/Infectious Status (If Applicable):  Lab Results   Component Value Date    HEPCAB NONREACTIVE 08/30/2020       COVID-19 Screening (If Applicable):   Lab Results   Component Value Date    COVID19 Detected 11/09/2020         Anesthesia Evaluation  Patient summary reviewed and Nursing notes reviewed no history of anesthetic complications:   Airway: Mallampati: II  TM distance: >3 FB   Neck ROM: full  Mouth opening: > = 3 FB Dental: normal exam         Pulmonary:Negative Pulmonary ROS and normal exam                               Cardiovascular:  Exercise tolerance: good (>4 METS),   (+) hypertension:, CAD: non-obstructive, CABG/stent: no interval change, CHF: systolic, hyperlipidemia    (-)  angina    ECG reviewed  Rhythm: regular  Rate: normal  Echocardiogram reviewed         Beta Blocker:  Dose within 24 Hrs         Neuro/Psych:   (+) CVA:,             GI/Hepatic/Renal:   (+) renal disease: kidney stones and CRI,           Endo/Other: Negative Endo/Other ROS                    Abdominal:           Vascular:                                      Anesthesia Plan      general     ASA 3     (GA LMA)  Induction: intravenous. MIPS: Postoperative opioids intended and Prophylactic antiemetics administered. Anesthetic plan and risks discussed with patient.       Plan discussed with CRNA and surgical team.                  Chica Rodriguez MD   12/11/2020

## 2020-12-11 NOTE — BRIEF OP NOTE
Brief Postoperative Note      Patient: Chung Marin  YOB: 1933  MRN: [id number]    Date of Procedure: 12/11/2020    Pre-Op Diagnosis: URINARY RETENTION    Post-Op Diagnosis: Same       Procedure(s):  CYSTO, GREENLIGHT XPS PROSTATE    Surgeon(s):  Tracey Araujo MD    Assistant:  Resident: Xander Gardner MD    Anesthesia: General    Estimated Blood Loss (mL): less than 50     Complications: None    Specimens:   * No specimens in log *    Implants:  * No implants in log *      Drains:   [REMOVED] Urethral Catheter Triple-lumen 24 fr (Removed)   Catheter Indications Urology/Urologist seeing this patient or inserted indwelling catheter 12/11/20 1126   Site Assessment No urethral drainage 12/11/20 1126   Urine Color Yellow 12/11/20 1126   Urine Appearance Clear 12/11/20 1126       Findings: Trilobar hypertrophy    Electronically signed by Xander Gardner MD on 12/11/2020 at 2:16 PM

## 2020-12-14 NOTE — PROGRESS NOTES
Patient present for payne removal post greenlight of prostate, dark yellow color urine noted in drainage bag, 30 ml balloon deflated, and 22 fr was removed without difficulty. Instructed patient to drink fluids, patient may experience urinary leakage, blood in the urine, and it may burn when urinating for a few weeks. If patient is unable to urinate in 8 hours, is to call the office to have payne placed or needs to go to the emergency room. Patient verbalized understanding.

## 2020-12-15 NOTE — ANESTHESIA POSTPROCEDURE EVALUATION
Department of Anesthesiology  Postprocedure Note    Patient: Arcelia Gan  MRN: [id number]  YOB: 1933  Date of evaluation: 12/15/2020  Time:  2:17 PM     Procedure Summary     Date: 12/11/20 Room / Location: [address]    Anesthesia Start: 8353 Anesthesia Stop: 0229    Procedure: Dolphus Level XPS PROSTATE (N/A ) Diagnosis: (URINARY RETENTION)    Surgeons: Natty Sullivan MD Responsible Provider: Luke Peraza MD    Anesthesia Type: general ASA Status: 3          Anesthesia Type: general    Be Phase I: Be Score: 10    Be Phase II: Be Score: 10    Last vitals: Reviewed and per EMR flowsheets.        Anesthesia Post Evaluation    Patient location during evaluation: PACU  Patient participation: complete - patient participated  Level of consciousness: awake and alert  Pain score: 2  Airway patency: patent  Nausea & Vomiting: no nausea and no vomiting  Complications: no  Cardiovascular status: hemodynamically stable  Respiratory status: room air  Hydration status: euvolemic  Comments: Late entry

## 2020-12-16 NOTE — TELEPHONE ENCOUNTER
Patient's daughter, Chemo Krishnan, called office to discuss medications with nurse. Please call her back at [phone].

## 2020-12-18 NOTE — PROGRESS NOTES
Nephrology Progress Note    Padmini Russ, APRN - [address] [place] 65      Patient is seen back in 3-month return visit on 12/18/2020 for chronic kidney disease stage IIIb. Creatinine has been stable over the last couple of months, including labs done at the end of November 2020. Creatinine on that date was 1.58 for an estimated GFR of 42 mL/min with normal electrolytes. Albumin was normal at 4.3. Normal liver function tests other than a elevated alkaline phosphatase mildly at 136. He has undergone greenlight laser procedure of the prostate on 12/11/2020 for BPH. Panchal catheter was removed on 12/14/2020. He is still having a little bit of blood in the urine. He is still having some urinary incontinence. He does note a relatively frequent urination with small volumes, although the symptoms appear to be getting a little bit better. The amount of blood in the urine is lessening, per the patient. Patient is tolerating the lower dose Entresto. We will recheck a BMP on 12/30/2020 to see if the renal function stays stable with the catheter out post laser procedure. If it is stable we may be able to go up a little bit on the [place] as long as we watch the labs. The patient will need routine every 6-month follow-up with nephrology here in the clinic. The patient was seen in September 2020 after a significant acute kidney injury episode prompting admission to [place] [address]. The patient has a history of chronic kidney disease stage III and a history of ischemic cardiomyopathy previously on Entresto. Apparently, according the patient's daughter, the patient had improvement in ejection fraction from the range of 30 to 35% up to a range of 40 to 45% on that medication. The patient was found to have a BUN of 98 and creatinine 7.5 with a potassium of 5.9 on 8/25/2020.   Those labs prompted transfer as the patient had a creatinine back in July 2020 of 1.96 with normal potassium. The patient ended up having a Panchal catheter placed because of obstructive uropathy. The patient now has a chronic indwelling Panchal catheter. He also had a prostate biopsy which showed prostate cancer. He is now being seen in the outpatient by Dr. Casey Hale urology and also by Dr. Umu Mahoney, oncology. The hope is that eventually the patient will be able to have the Panchal catheter removed if he has enough bladder function. There is no set timeframe for removal of the Panchal catheter. Lab data following the discharge from 9/13/2020 showed a BUN 24 and creatinine 1.5 with normal electrolytes, including a potassium of 4.9. The patient recently saw his cardiologist, Dr. Tanya Bustamante, and there was discussion about the timing as to when to get the patient back on his Entresto. The patient's dose was . Dr. Annabelle Salguero has put the patient back on Entresto on the lowest dose 2426. Patient feels well. He has no edema. He has no shortness of breath. He is on room air. He has no chest pain. Appetite is good. Urinary symptoms are as dictated above. Again, he is without the Panchal catheter since earlier this week 12/14/2020. He moves his bowels about every day. Medications are reviewed today.     Chief Complaint   Patient presents with    Chronic Kidney Disease     3mt follow up        Patient Active Problem List   Diagnosis    Hydroureteronephrosis    Hyperkalemia    Coronary artery disease involving coronary bypass graft of native heart without angina pectoris    Benign prostatic hyperplasia with urinary obstruction    Abdominal aortic aneurysm (AAA) without rupture ([address]    Coronary arteriosclerosis in native artery    Hypercholesterolemia    Mixed hyperlipidemia    Upper gastrointestinal bleeding    Chronic systolic heart failure (HCC)    Benign hypertension with chronic kidney disease, stage II    LBBB (left bundle branch block)    Gross hematuria    CKD (chronic kidney disease) stage 3, GFR 30-59 ml/min    Urinary obstruction    Prostate cancer, primary, with metastasis from prostate to other site [facility])    [facility]SHLEY (acute kidney injury) [facility]    Elevated PSA       REVIEW OF SYSTEMS     Constitutional: No fever, no chills, no lethargy, no weakness. His appetite is improved. HEENT:  No headache, ear pain, itchy eyes, nasal discharge or sore throat. Cardiac:  No chest pain, shortness of breath, orthopnea or PND. Chest:   No cough, phlegm or wheezing. Abdomen:  No abdominal pain, nausea or vomiting. Neuro:  No focal weakness, abnormal movements or seizure like activity. Skin:   No rashes, no itching. :   Panchal catheter out since 4 days ago, just had greenlight laser procedure on 12/11/202) for BPH  Extremities:  No swelling or joint pains.        OBJECTIVE      Vitals:    12/18/20 1126   BP: 100/60   Pulse: 74     Wt Readings from Last 2 Encounters:   12/18/20 181 lb (82.1 kg)   12/11/20 185 lb (83.9 kg)        PHYSICAL EXAM      GENERAL APPEARANCE:Awake, alert, in no acute distress  SKIN: warm and dry, no rash or erythema  EYES: conjunctivae normal and sclera anicteric  ENT:  moist mucus membranes   NECK: No obvious JVD, midline trachea, no accessory muscle use  PULMONARY: Good bilateral air entry and clear to auscultation bilaterally except for some diminished breath sounds at the lung bases and without wheezes or rales or rhonchi  CARDIOVASCULAR: Regular rate and rhythm with positive S1 and S2 no rubs  ABDOMEN: Panchal out, soft and not tender not distended with active bowel sounds, no flank discomfort to palpation  EXTREMITIES: No pitting pretibial or ankle edema bilaterally    CURRENT MEDICATIONS      Current Outpatient Medications   Medication Sig Dispense Refill    cephALEXin (KEFLEX) 500 MG capsule Take 1 capsule by mouth 2 times daily for 10 days 20 capsule 0    levoFLOXacin (LEVAQUIN) 250 MG tablet Take 1 tablet by mouth daily for 7 days 7 tablet 0  ferrous sulfate (IRON 325) 325 (65 Fe) MG tablet Take 1 tablet by mouth 2 times daily 60 tablet 2    vitamin D3 (CHOLECALCIFEROL) 25 MCG (1000 UT) TABS tablet Take 2 tablets by mouth daily 30 tablet 5    Multiple Vitamins-Minerals (THERAPEUTIC MULTIVITAMIN-MINERALS) tablet Take 1 tablet by mouth daily      sacubitril-valsartan (ENTRESTO) 24-26 MG per tablet Take 1 tablet by mouth 2 times daily      bicalutamide (CASODEX) 50 MG chemo tablet TAKE 1 TABLET BY MOUTH EVERY DAY 30 tablet 1    midodrine (PROAMATINE) 5 MG tablet Take 10 mg by mouth nightly      metoprolol succinate (TOPROL XL) 25 MG extended release tablet Take 1 tablet by mouth daily Replaces carvedilol. Hold for systolic BP less than 868 or Heart rate less than 55 30 tablet 3    potassium chloride (KLOR-CON) 10 MEQ extended release tablet Take 1 tablet by mouth daily Dose decreased to 10meq      tamsulosin (FLOMAX) 0.4 MG capsule Take 0.4 mg by mouth daily      rosuvastatin (CRESTOR) 10 MG tablet Take 10 mg by mouth daily       No current facility-administered medications for this visit. LABS      No results for input(s): WBC, RBC, HGB, HCT, MCV, MCH, MCHC, RDW, PLT, MPV in the last 72 hours. BMP: No results for input(s): NA, K, CL, CO2, BUN, CREATININE, GLUCOSE, CALCIUM in the last 72 hours. Phosphorus:   No results for input(s): PHOS in the last 72 hours. Magnesium:  No results for input(s): MG in the last 72 hours. Albumin:  No results for input(s): LABALBU in the last 72 hours. BNP:    No results found for: BNP  ESTEPHANIA:      Lab Results   Component Value Date    ESTEPHANIA NEGATIVE 08/30/2020     SPEP:  Lab Results   Component Value Date    PROT 8.2 11/25/2020    ALBCAL 3.2 08/29/2020    ALBPCT 59 08/29/2020    LABALPH 0.2 08/29/2020    LABALPH 0.7 08/29/2020    A1PCT 3 08/29/2020    A2PCT 13 08/29/2020    LABBETA 0.6 08/29/2020    BETAPCT 11 08/29/2020    GAMGLOB 0.8 08/29/2020    GGPCT 14 08/29/2020    PATH ELECTRONICALLY SIGNED. Lynda Guevara M.D. 08/29/2020    PATH ELECTRONICALLY SIGNED. Lynda Guevara M.D. 08/29/2020     UPEP:     Lab Results   Component Value Date    LABPE  08/29/2020     ELEVATED PROTEIN CONCENTRATION. MOST SERUM PROTEINS ARE DETECTED IN THIS URINE. USUALLY OBSERVED WITH MARKEDLY INCREASED NON-SELECTIVE GLOMERULAR PERMEABILITY (i.e. SEVERE GLOMERULAR DISEASE), CONTAMINATION OF     C3:     Lab Results   Component Value Date    C3 101 08/29/2020     C4:     Lab Results   Component Value Date    C4 39 08/29/2020     MPO ANCA:   No results found for: MPO  PR3 ANCA:   No results found for: PR3  Anti-GBM:   No results found for: GBMABIGG  Hep BsAg:         Lab Results   Component Value Date    HEPBSAG NONREACTIVE 08/30/2020     Hep C AB:          Lab Results   Component Value Date    HEPCAB NONREACTIVE 08/30/2020         URINALYSIS/URINE CHEMISTRIES      Lab Results   Component Value Date    NITRU POSITIVE 08/29/2020    COLORU RED 08/29/2020    PHUR 7.5 08/29/2020    WBCUA 0 TO 2 08/29/2020    RBCUA TOO NUMEROUS TO COUNT 08/29/2020    MUCUS NOT REPORTED 08/29/2020    TRICHOMONAS NOT REPORTED 08/29/2020    YEAST NOT REPORTED 08/29/2020    BACTERIA NOT REPORTED 08/29/2020    SPECGRAV 1.015 08/29/2020    LEUKOCYTESUR SMALL 08/29/2020    UROBILINOGEN ELEVATED 08/29/2020    BILIRUBINUR NEGATIVE 08/29/2020    GLUCOSEU NEGATIVE 08/29/2020    KETUA TRACE 08/29/2020    AMORPHOUS NOT REPORTED 08/29/2020     Urine Sodium:     Lab Results   Component Value Date    ROXANNA 98 08/29/2020     Urine Potassium:  No results found for: KUR  Urine Chloride:  No results found for: CLUR  Urine Osmolarity:   Lab Results   Component Value Date    OSMOU 326 08/29/2020     Urine Protein:     Lab Results   Component Value Date     08/29/2020     Urine Creatinine:     Lab Results   Component Value Date    LABCREA 37.9 08/29/2020     UPC:     Urine Eosinophils:  No components found for: UEOS      RADIOLOGY             ASSESSMENT     1. Chronic Kidney Disease stage IIIb and appears likely secondary to a degree of nephrosclerosis versus ischemic nephropathy versus residual renal dysfunction after a recent severe acute kidney injury episode in the setting of bladder outlet obstruction in the setting of prostate cancer. Last creatinine 1.58 for estimated GFR of 42 mL/min. 2.  Anemia of chronic disease and blood loss with last hemoglobin of 10.4  3. Ischemic cardiomyopathy with the patient previously on Entresto that was stopped when acute kidney injury occurred. He is now back on low-dose Entresto. 4.  Hypotension during the patient's recent hospitalization for acute kidney injury and urinary tract outlet obstruction with the patient now having some improvement in blood pressure and not requiring the midodrine. 5.  Recently diagnosed prostate cancer and BPH with bladder outlet obstruction obstruction now with chronic indwelling Panchal catheter and seeing urology and oncology  6. No evidence of fluid overload  7. Acute on chronic kidney injury secondary to urinary tract obstruction, improved  8. Hypovitaminosis D, on a supplement       PLAN     1. Basic metabolic panel in a couple of weeks  2. If the renal function and electrolyte profile are stable, we may be able to ask cardiology to increase the Entresto dose. The daughters phone number is in the chart and so we would be contacting her for updates. 3.  Monitor urology and oncology evaluation and treatment plans  4. Return to see me in 6 months, earlier if needed      Please do not hesitate to call with questions.     Electronically signed by Oscar Munguia MD on 12/18/2020 at 11:29 AM

## 2020-12-30 NOTE — PROGRESS NOTES
Patient is here for Eligard injection, Zometa infusion, and lab draw. Vitals taken and are stable. Patient denies any discomforts. Labs drawn from left arm without difficulties. Eligard given SQ in left arm and pt tolerated well. Labs noted and okay to proceed with treatment. PIV started in left hand. Zometa infused as ordered and without difficulties. IV taken out and pt discharged ambulatory/stable.

## 2021-01-01 ENCOUNTER — HOSPITAL ENCOUNTER (OUTPATIENT)
Dept: NUCLEAR MEDICINE | Age: 86
Discharge: HOME | End: 2021-08-12
Payer: MEDICARE

## 2021-01-01 ENCOUNTER — OFFICE VISIT (OUTPATIENT)
Dept: NEPHROLOGY | Age: 86
End: 2021-01-01
Payer: MEDICARE

## 2021-01-01 ENCOUNTER — TELEPHONE (OUTPATIENT)
Dept: ONCOLOGY | Age: 86
End: 2021-01-01

## 2021-01-01 ENCOUNTER — TELEPHONE (OUTPATIENT)
Dept: SURGERY | Age: 86
End: 2021-01-01

## 2021-01-01 ENCOUNTER — ANESTHESIA (OUTPATIENT)
Dept: OPERATING ROOM | Age: 86
End: 2021-01-01
Payer: MEDICARE

## 2021-01-01 ENCOUNTER — APPOINTMENT (OUTPATIENT)
Dept: NUCLEAR MEDICINE | Age: 86
End: 2021-01-01
Payer: MEDICARE

## 2021-01-01 ENCOUNTER — OFFICE VISIT (OUTPATIENT)
Dept: ONCOLOGY | Age: 86
End: 2021-01-01
Payer: MEDICARE

## 2021-01-01 ENCOUNTER — HOSPITAL ENCOUNTER (OUTPATIENT)
Dept: INFUSION THERAPY | Age: 86
Discharge: HOME | End: 2021-11-04
Payer: MEDICARE

## 2021-01-01 ENCOUNTER — HOSPITAL ENCOUNTER (OUTPATIENT)
Dept: INFUSION THERAPY | Age: 86
Discharge: HOME | End: 2021-04-28
Payer: MEDICARE

## 2021-01-01 ENCOUNTER — HOSPITAL ENCOUNTER (OUTPATIENT)
Dept: INFUSION THERAPY | Age: 86
Setting detail: INFUSION SERIES
Discharge: HOME | End: 2021-10-29
Payer: MEDICARE

## 2021-01-01 ENCOUNTER — HOSPITAL ENCOUNTER (OUTPATIENT)
Dept: INFUSION THERAPY | Age: 86
Discharge: HOME | End: 2021-10-28
Payer: MEDICARE

## 2021-01-01 ENCOUNTER — HOSPITAL ENCOUNTER (OUTPATIENT)
Dept: LAB | Age: 86
Discharge: HOME | End: 2021-05-24
Payer: MEDICARE

## 2021-01-01 ENCOUNTER — HOSPITAL ENCOUNTER (OUTPATIENT)
Dept: LAB | Age: 86
Discharge: HOME | End: 2021-02-22
Payer: MEDICARE

## 2021-01-01 ENCOUNTER — HOSPITAL ENCOUNTER (OUTPATIENT)
Dept: INFUSION THERAPY | Age: 86
Discharge: HOME | End: 2021-03-03
Payer: MEDICARE

## 2021-01-01 ENCOUNTER — HOSPITAL ENCOUNTER (OUTPATIENT)
Dept: LAB | Age: 86
Discharge: HOME | End: 2021-09-13
Payer: MEDICARE

## 2021-01-01 ENCOUNTER — HOSPITAL ENCOUNTER (OUTPATIENT)
Age: 86
Setting detail: OUTPATIENT SURGERY
Discharge: HOME | End: 2021-10-19
Attending: SURGERY | Admitting: SURGERY
Payer: MEDICARE

## 2021-01-01 ENCOUNTER — CLINICAL DOCUMENTATION (OUTPATIENT)
Dept: SPIRITUAL SERVICES | Age: 86
End: 2021-01-01

## 2021-01-01 ENCOUNTER — ANESTHESIA EVENT (OUTPATIENT)
Dept: OPERATING ROOM | Age: 86
End: 2021-01-01
Payer: MEDICARE

## 2021-01-01 ENCOUNTER — HOSPITAL ENCOUNTER (OUTPATIENT)
Dept: INFUSION THERAPY | Age: 86
End: 2021-01-01

## 2021-01-01 ENCOUNTER — HOSPITAL ENCOUNTER (OUTPATIENT)
Dept: CT IMAGING | Age: 86
Discharge: HOME | End: 2021-08-07
Payer: MEDICARE

## 2021-01-01 ENCOUNTER — TELEPHONE (OUTPATIENT)
Dept: UROLOGY | Age: 86
End: 2021-01-01

## 2021-01-01 ENCOUNTER — OFFICE VISIT (OUTPATIENT)
Dept: UROLOGY | Age: 86
End: 2021-01-01
Payer: MEDICARE

## 2021-01-01 ENCOUNTER — HOSPITAL ENCOUNTER (OUTPATIENT)
Dept: INFUSION THERAPY | Age: 86
Discharge: HOME | End: 2021-03-31
Payer: MEDICARE

## 2021-01-01 ENCOUNTER — HOSPITAL ENCOUNTER (OUTPATIENT)
Dept: INFUSION THERAPY | Age: 86
Discharge: HOME | End: 2021-05-26
Payer: MEDICARE

## 2021-01-01 ENCOUNTER — HOSPITAL ENCOUNTER (OUTPATIENT)
Dept: INFUSION THERAPY | Age: 86
Discharge: HOME | End: 2021-01-27
Payer: MEDICARE

## 2021-01-01 ENCOUNTER — HOSPITAL ENCOUNTER (OUTPATIENT)
Dept: INFUSION THERAPY | Age: 86
Discharge: HOME | End: 2021-09-16
Payer: MEDICARE

## 2021-01-01 ENCOUNTER — HOSPITAL ENCOUNTER (OUTPATIENT)
Dept: LAB | Age: 86
Discharge: HOME | End: 2021-08-10
Payer: MEDICARE

## 2021-01-01 VITALS
DIASTOLIC BLOOD PRESSURE: 55 MMHG | WEIGHT: 186.6 LBS | HEIGHT: 67 IN | TEMPERATURE: 97.2 F | SYSTOLIC BLOOD PRESSURE: 109 MMHG | OXYGEN SATURATION: 98 % | BODY MASS INDEX: 29.29 KG/M2 | HEART RATE: 72 BPM | RESPIRATION RATE: 16 BRPM

## 2021-01-01 VITALS
SYSTOLIC BLOOD PRESSURE: 122 MMHG | BODY MASS INDEX: 24.8 KG/M2 | DIASTOLIC BLOOD PRESSURE: 60 MMHG | HEART RATE: 84 BPM | OXYGEN SATURATION: 97 % | HEART RATE: 68 BPM | RESPIRATION RATE: 16 BRPM | DIASTOLIC BLOOD PRESSURE: 49 MMHG | TEMPERATURE: 97.5 F | OXYGEN SATURATION: 100 % | HEIGHT: 67 IN | RESPIRATION RATE: 16 BRPM | TEMPERATURE: 97.3 F | SYSTOLIC BLOOD PRESSURE: 101 MMHG | WEIGHT: 158 LBS

## 2021-01-01 VITALS
SYSTOLIC BLOOD PRESSURE: 134 MMHG | DIASTOLIC BLOOD PRESSURE: 65 MMHG | TEMPERATURE: 97.3 F | OXYGEN SATURATION: 94 % | HEART RATE: 87 BPM

## 2021-01-01 VITALS
WEIGHT: 166.8 LBS | BODY MASS INDEX: 26.18 KG/M2 | SYSTOLIC BLOOD PRESSURE: 112 MMHG | HEART RATE: 96 BPM | TEMPERATURE: 98.1 F | DIASTOLIC BLOOD PRESSURE: 62 MMHG | OXYGEN SATURATION: 99 % | HEIGHT: 67 IN

## 2021-01-01 VITALS
RESPIRATION RATE: 16 BRPM | HEART RATE: 67 BPM | BODY MASS INDEX: 28.09 KG/M2 | WEIGHT: 179 LBS | SYSTOLIC BLOOD PRESSURE: 126 MMHG | HEIGHT: 67 IN | DIASTOLIC BLOOD PRESSURE: 56 MMHG | OXYGEN SATURATION: 99 %

## 2021-01-01 VITALS
SYSTOLIC BLOOD PRESSURE: 112 MMHG | BODY MASS INDEX: 28.88 KG/M2 | WEIGHT: 184 LBS | HEIGHT: 67 IN | DIASTOLIC BLOOD PRESSURE: 58 MMHG | OXYGEN SATURATION: 98 % | HEART RATE: 87 BPM

## 2021-01-01 VITALS
DIASTOLIC BLOOD PRESSURE: 56 MMHG | TEMPERATURE: 97.5 F | RESPIRATION RATE: 16 BRPM | HEART RATE: 78 BPM | SYSTOLIC BLOOD PRESSURE: 123 MMHG

## 2021-01-01 VITALS
SYSTOLIC BLOOD PRESSURE: 100 MMHG | OXYGEN SATURATION: 97 % | TEMPERATURE: 97.2 F | DIASTOLIC BLOOD PRESSURE: 62 MMHG | HEART RATE: 110 BPM | BODY MASS INDEX: 27.25 KG/M2 | WEIGHT: 173.6 LBS | HEIGHT: 67 IN

## 2021-01-01 VITALS
HEART RATE: 74 BPM | BODY MASS INDEX: 28.28 KG/M2 | WEIGHT: 180.2 LBS | SYSTOLIC BLOOD PRESSURE: 124 MMHG | DIASTOLIC BLOOD PRESSURE: 70 MMHG | OXYGEN SATURATION: 98 % | HEIGHT: 67 IN

## 2021-01-01 VITALS
HEART RATE: 99 BPM | DIASTOLIC BLOOD PRESSURE: 38 MMHG | DIASTOLIC BLOOD PRESSURE: 55 MMHG | HEART RATE: 85 BPM | SYSTOLIC BLOOD PRESSURE: 110 MMHG | SYSTOLIC BLOOD PRESSURE: 104 MMHG | RESPIRATION RATE: 16 BRPM | OXYGEN SATURATION: 99 % | TEMPERATURE: 98.8 F | RESPIRATION RATE: 16 BRPM | TEMPERATURE: 96.8 F

## 2021-01-01 VITALS
HEIGHT: 67 IN | HEART RATE: 86 BPM | SYSTOLIC BLOOD PRESSURE: 116 MMHG | TEMPERATURE: 97.2 F | DIASTOLIC BLOOD PRESSURE: 72 MMHG | OXYGEN SATURATION: 98 % | BODY MASS INDEX: 28.41 KG/M2 | WEIGHT: 181 LBS

## 2021-01-01 VITALS
DIASTOLIC BLOOD PRESSURE: 60 MMHG | HEIGHT: 67 IN | SYSTOLIC BLOOD PRESSURE: 124 MMHG | RESPIRATION RATE: 16 BRPM | HEART RATE: 88 BPM | TEMPERATURE: 97.7 F | WEIGHT: 187.8 LBS | BODY MASS INDEX: 29.47 KG/M2 | OXYGEN SATURATION: 98 %

## 2021-01-01 VITALS — DIASTOLIC BLOOD PRESSURE: 40 MMHG | TEMPERATURE: 97 F | SYSTOLIC BLOOD PRESSURE: 98 MMHG | HEART RATE: 82 BPM

## 2021-01-01 VITALS
DIASTOLIC BLOOD PRESSURE: 64 MMHG | SYSTOLIC BLOOD PRESSURE: 108 MMHG | WEIGHT: 166 LBS | OXYGEN SATURATION: 98 % | TEMPERATURE: 97.7 F | HEART RATE: 108 BPM | BODY MASS INDEX: 26.06 KG/M2 | HEIGHT: 67 IN

## 2021-01-01 VITALS
TEMPERATURE: 97.1 F | OXYGEN SATURATION: 96 % | DIASTOLIC BLOOD PRESSURE: 51 MMHG | RESPIRATION RATE: 16 BRPM | SYSTOLIC BLOOD PRESSURE: 93 MMHG | HEART RATE: 88 BPM

## 2021-01-01 VITALS
SYSTOLIC BLOOD PRESSURE: 123 MMHG | HEART RATE: 95 BPM | TEMPERATURE: 97.2 F | DIASTOLIC BLOOD PRESSURE: 54 MMHG | RESPIRATION RATE: 16 BRPM | OXYGEN SATURATION: 93 %

## 2021-01-01 VITALS
HEIGHT: 67 IN | SYSTOLIC BLOOD PRESSURE: 122 MMHG | HEART RATE: 92 BPM | OXYGEN SATURATION: 98 % | BODY MASS INDEX: 28.41 KG/M2 | DIASTOLIC BLOOD PRESSURE: 78 MMHG | WEIGHT: 181 LBS

## 2021-01-01 VITALS
RESPIRATION RATE: 17 BRPM | SYSTOLIC BLOOD PRESSURE: 99 MMHG | OXYGEN SATURATION: 100 % | DIASTOLIC BLOOD PRESSURE: 49 MMHG

## 2021-01-01 DIAGNOSIS — C61 PROSTATE CANCER, PRIMARY, WITH METASTASIS FROM PROSTATE TO OTHER SITE (HCC): ICD-10-CM

## 2021-01-01 DIAGNOSIS — D64.9 ANEMIA, UNSPECIFIED TYPE: Primary | ICD-10-CM

## 2021-01-01 DIAGNOSIS — C61 PROSTATE CANCER (HCC): Primary | ICD-10-CM

## 2021-01-01 DIAGNOSIS — C61 PROSTATE CANCER, PRIMARY, WITH METASTASIS FROM PROSTATE TO OTHER SITE (HCC): Primary | ICD-10-CM

## 2021-01-01 DIAGNOSIS — D63.1 ANEMIA IN STAGE 3B CHRONIC KIDNEY DISEASE (HCC): ICD-10-CM

## 2021-01-01 DIAGNOSIS — R33.9 URINARY RETENTION: Primary | ICD-10-CM

## 2021-01-01 DIAGNOSIS — N18.32 STAGE 3B CHRONIC KIDNEY DISEASE (HCC): ICD-10-CM

## 2021-01-01 DIAGNOSIS — I25.5 ISCHEMIC CARDIOMYOPATHY: ICD-10-CM

## 2021-01-01 DIAGNOSIS — D64.9 ANEMIA, UNSPECIFIED TYPE: ICD-10-CM

## 2021-01-01 DIAGNOSIS — C61 PROSTATE CANCER (HCC): ICD-10-CM

## 2021-01-01 DIAGNOSIS — R31.0 GROSS HEMATURIA: ICD-10-CM

## 2021-01-01 DIAGNOSIS — E55.9 HYPOVITAMINOSIS D: ICD-10-CM

## 2021-01-01 DIAGNOSIS — D63.1 ANEMIA IN STAGE 3B CHRONIC KIDNEY DISEASE (HCC): Primary | ICD-10-CM

## 2021-01-01 DIAGNOSIS — N18.32 ANEMIA IN STAGE 3B CHRONIC KIDNEY DISEASE (HCC): ICD-10-CM

## 2021-01-01 DIAGNOSIS — I10 ESSENTIAL HYPERTENSION: ICD-10-CM

## 2021-01-01 DIAGNOSIS — R33.9 URINARY RETENTION: ICD-10-CM

## 2021-01-01 DIAGNOSIS — D50.0 IRON DEFICIENCY ANEMIA DUE TO CHRONIC BLOOD LOSS: ICD-10-CM

## 2021-01-01 DIAGNOSIS — N18.32 ANEMIA IN STAGE 3B CHRONIC KIDNEY DISEASE (HCC): Primary | ICD-10-CM

## 2021-01-01 DIAGNOSIS — R29.898 MUSCULAR DECONDITIONING: ICD-10-CM

## 2021-01-01 DIAGNOSIS — R53.83 FATIGUE, UNSPECIFIED TYPE: Primary | ICD-10-CM

## 2021-01-01 DIAGNOSIS — N18.32 STAGE 3B CHRONIC KIDNEY DISEASE (HCC): Primary | ICD-10-CM

## 2021-01-01 DIAGNOSIS — R13.10 DYSPHAGIA, UNSPECIFIED TYPE: Primary | ICD-10-CM

## 2021-01-01 LAB
ABO/RH: NORMAL
ABSOLUTE EOS #: 0.03 K/UL (ref 0–0.4)
ABSOLUTE EOS #: 0.07 K/UL (ref 0–0.44)
ABSOLUTE IMMATURE GRANULOCYTE: 0.04 K/UL (ref 0–0.3)
ABSOLUTE IMMATURE GRANULOCYTE: 0.07 K/UL (ref 0–0.3)
ABSOLUTE LYMPH #: 0.32 K/UL (ref 1.1–3.7)
ABSOLUTE LYMPH #: 0.48 K/UL (ref 1–4.8)
ABSOLUTE MONO #: 0.27 K/UL (ref 0.1–1.2)
ABSOLUTE MONO #: 0.36 K/UL (ref 0.1–1.2)
ALBUMIN SERPL-MCNC: 3.3 G/DL (ref 3.5–5.2)
ALBUMIN/GLOBULIN RATIO: 1 (ref 1–2.5)
ALP BLD-CCNC: 968 U/L (ref 40–129)
ALT SERPL-CCNC: 8 U/L (ref 5–41)
ANION GAP SERPL CALCULATED.3IONS-SCNC: 10 MMOL/L (ref 9–17)
ANION GAP SERPL CALCULATED.3IONS-SCNC: 11 MMOL/L (ref 9–17)
ANION GAP SERPL CALCULATED.3IONS-SCNC: 13 MMOL/L (ref 9–17)
ANION GAP SERPL CALCULATED.3IONS-SCNC: 15 MMOL/L (ref 9–17)
ANION GAP SERPL CALCULATED.3IONS-SCNC: 15 MMOL/L (ref 9–17)
ANION GAP SERPL CALCULATED.3IONS-SCNC: 9 MMOL/L (ref 9–17)
ANION GAP SERPL CALCULATED.3IONS-SCNC: 9 MMOL/L (ref 9–17)
ANTIBODY SCREEN: NEGATIVE
ARM BAND NUMBER: NORMAL
AST SERPL-CCNC: 38 U/L
BASOPHILS # BLD: 0 % (ref 0–2)
BASOPHILS # BLD: 0 % (ref 0–2)
BASOPHILS ABSOLUTE: 0 K/UL (ref 0–0.2)
BASOPHILS ABSOLUTE: 0 K/UL (ref 0–0.2)
BILIRUB SERPL-MCNC: 0.54 MG/DL (ref 0.3–1.2)
BLD PROD TYP BPU: NORMAL
BUN BLDV-MCNC: 22 MG/DL (ref 8–23)
BUN BLDV-MCNC: 24 MG/DL (ref 8–23)
BUN BLDV-MCNC: 24 MG/DL (ref 8–23)
BUN BLDV-MCNC: 25 MG/DL (ref 8–23)
BUN BLDV-MCNC: 25 MG/DL (ref 8–23)
BUN BLDV-MCNC: 27 MG/DL (ref 8–23)
BUN BLDV-MCNC: 28 MG/DL (ref 8–23)
BUN/CREAT BLD: 15 (ref 9–20)
BUN/CREAT BLD: 17 (ref 9–20)
BUN/CREAT BLD: 17 (ref 9–20)
BUN/CREAT BLD: 18 (ref 9–20)
BUN/CREAT BLD: 18 (ref 9–20)
BUN/CREAT BLD: 19 (ref 9–20)
BUN/CREAT BLD: 22 (ref 9–20)
CALCIUM SERPL-MCNC: 7.7 MG/DL (ref 8.6–10.4)
CALCIUM SERPL-MCNC: 7.9 MG/DL (ref 8.6–10.4)
CALCIUM SERPL-MCNC: 8.4 MG/DL (ref 8.6–10.4)
CALCIUM SERPL-MCNC: 8.5 MG/DL (ref 8.6–10.4)
CALCIUM SERPL-MCNC: 8.7 MG/DL (ref 8.6–10.4)
CALCIUM SERPL-MCNC: 9.4 MG/DL (ref 8.6–10.4)
CALCIUM SERPL-MCNC: 9.8 MG/DL (ref 8.6–10.4)
CHLORIDE BLD-SCNC: 104 MMOL/L (ref 98–107)
CHLORIDE BLD-SCNC: 104 MMOL/L (ref 98–107)
CHLORIDE BLD-SCNC: 105 MMOL/L (ref 98–107)
CHLORIDE BLD-SCNC: 105 MMOL/L (ref 98–107)
CHLORIDE BLD-SCNC: 106 MMOL/L (ref 98–107)
CHLORIDE BLD-SCNC: 108 MMOL/L (ref 98–107)
CHLORIDE BLD-SCNC: 108 MMOL/L (ref 98–107)
CO2: 19 MMOL/L (ref 20–31)
CO2: 20 MMOL/L (ref 20–31)
CO2: 23 MMOL/L (ref 20–31)
CO2: 24 MMOL/L (ref 20–31)
CO2: 24 MMOL/L (ref 20–31)
CREAT SERPL-MCNC: 1.25 MG/DL (ref 0.7–1.2)
CREAT SERPL-MCNC: 1.27 MG/DL (ref 0.7–1.2)
CREAT SERPL-MCNC: 1.37 MG/DL (ref 0.7–1.2)
CREAT SERPL-MCNC: 1.42 MG/DL (ref 0.7–1.2)
CREAT SERPL-MCNC: 1.43 MG/DL (ref 0.7–1.2)
CREAT SERPL-MCNC: 1.5 MG/DL (ref 0.7–1.2)
CREAT SERPL-MCNC: 1.67 MG/DL (ref 0.7–1.2)
CROSSMATCH RESULT: NORMAL
DIFFERENTIAL TYPE: ABNORMAL
DIFFERENTIAL TYPE: ABNORMAL
DISPENSE STATUS BLOOD BANK: NORMAL
EOSINOPHILS RELATIVE PERCENT: 1 % (ref 1–8)
EOSINOPHILS RELATIVE PERCENT: 2 % (ref 1–4)
EXPIRATION DATE: NORMAL
GFR AFRICAN AMERICAN: 47 ML/MIN
GFR AFRICAN AMERICAN: 54 ML/MIN
GFR AFRICAN AMERICAN: 57 ML/MIN
GFR AFRICAN AMERICAN: 57 ML/MIN
GFR AFRICAN AMERICAN: 60 ML/MIN
GFR AFRICAN AMERICAN: >60 ML/MIN
GFR AFRICAN AMERICAN: >60 ML/MIN
GFR NON-AFRICAN AMERICAN: 39 ML/MIN
GFR NON-AFRICAN AMERICAN: 44 ML/MIN
GFR NON-AFRICAN AMERICAN: 47 ML/MIN
GFR NON-AFRICAN AMERICAN: 47 ML/MIN
GFR NON-AFRICAN AMERICAN: 49 ML/MIN
GFR NON-AFRICAN AMERICAN: 54 ML/MIN
GFR NON-AFRICAN AMERICAN: 55 ML/MIN
GFR SERPL CREATININE-BSD FRML MDRD: ABNORMAL ML/MIN/{1.73_M2}
GLUCOSE BLD-MCNC: 104 MG/DL (ref 70–99)
GLUCOSE BLD-MCNC: 107 MG/DL (ref 70–99)
GLUCOSE BLD-MCNC: 121 MG/DL (ref 70–99)
GLUCOSE BLD-MCNC: 123 MG/DL (ref 70–99)
GLUCOSE BLD-MCNC: 93 MG/DL (ref 70–99)
GLUCOSE BLD-MCNC: 95 MG/DL (ref 70–99)
GLUCOSE BLD-MCNC: 97 MG/DL (ref 70–99)
HCT VFR BLD CALC: 22 % (ref 40.7–50.3)
HCT VFR BLD CALC: 27.4 % (ref 40.7–50.3)
HEMOGLOBIN: 6.6 G/DL (ref 13–17)
HEMOGLOBIN: 8.5 G/DL (ref 13–17)
IMMATURE GRANULOCYTES: 1 %
IMMATURE GRANULOCYTES: 2 %
IRON SATURATION: 32 % (ref 20–55)
IRON: 70 UG/DL (ref 59–158)
LYMPHOCYTES # BLD: 14 % (ref 15–43)
LYMPHOCYTES # BLD: 9 % (ref 24–43)
MCH RBC QN AUTO: 31.3 PG (ref 25.2–33.5)
MCH RBC QN AUTO: 32 PG (ref 25.2–33.5)
MCHC RBC AUTO-ENTMCNC: 30 G/DL (ref 25.2–33.5)
MCHC RBC AUTO-ENTMCNC: 31 G/DL (ref 25.2–33.5)
MCV RBC AUTO: 103 FL (ref 82.6–102.9)
MCV RBC AUTO: 104.3 FL (ref 82.6–102.9)
MONOCYTES # BLD: 10 % (ref 3–12)
MONOCYTES # BLD: 8 % (ref 6–14)
MORPHOLOGY: ABNORMAL
NRBC AUTOMATED: 0 PER 100 WBC
NRBC AUTOMATED: 0 PER 100 WBC
PDW BLD-RTO: 19.2 % (ref 11.8–14.4)
PDW BLD-RTO: 19.6 % (ref 11.8–14.4)
PLATELET # BLD: 156 K/UL (ref 138–453)
PLATELET # BLD: 158 K/UL (ref 138–453)
PLATELET ESTIMATE: ABNORMAL
PLATELET ESTIMATE: ABNORMAL
PMV BLD AUTO: 10 FL (ref 8.1–13.5)
PMV BLD AUTO: 9.9 FL (ref 8.1–13.5)
POTASSIUM SERPL-SCNC: 4 MMOL/L (ref 3.7–5.3)
POTASSIUM SERPL-SCNC: 4.2 MMOL/L (ref 3.7–5.3)
POTASSIUM SERPL-SCNC: 4.5 MMOL/L (ref 3.7–5.3)
POTASSIUM SERPL-SCNC: 4.6 MMOL/L (ref 3.7–5.3)
POTASSIUM SERPL-SCNC: 4.9 MMOL/L (ref 3.7–5.3)
POTASSIUM SERPL-SCNC: 5.1 MMOL/L (ref 3.7–5.3)
POTASSIUM SERPL-SCNC: 5.6 MMOL/L (ref 3.7–5.3)
PROSTATE SPECIFIC ANTIGEN: 10.59 UG/L
PROSTATE SPECIFIC ANTIGEN: 52.62 UG/L
RBC # BLD: 2.11 M/UL (ref 4.21–5.77)
RBC # BLD: 2.66 M/UL (ref 4.21–5.77)
RBC # BLD: ABNORMAL 10*6/UL
RBC # BLD: ABNORMAL 10*6/UL
SEG NEUTROPHILS: 75 % (ref 44–74)
SEG NEUTROPHILS: 78 % (ref 36–65)
SEGMENTED NEUTROPHILS ABSOLUTE COUNT: 2.55 K/UL (ref 1.5–8.1)
SEGMENTED NEUTROPHILS ABSOLUTE COUNT: 2.81 K/UL (ref 1.5–8.1)
SODIUM BLD-SCNC: 137 MMOL/L (ref 135–144)
SODIUM BLD-SCNC: 137 MMOL/L (ref 135–144)
SODIUM BLD-SCNC: 138 MMOL/L (ref 135–144)
SODIUM BLD-SCNC: 139 MMOL/L (ref 135–144)
SODIUM BLD-SCNC: 139 MMOL/L (ref 135–144)
SODIUM BLD-SCNC: 140 MMOL/L (ref 135–144)
SODIUM BLD-SCNC: 140 MMOL/L (ref 135–144)
SURGICAL PATHOLOGY REPORT: NORMAL
TOTAL IRON BINDING CAPACITY: 221 UG/DL (ref 250–450)
TOTAL PROTEIN: 6.6 G/DL (ref 6.4–8.3)
TRANSFUSION STATUS: NORMAL
UNIT DIVISION: 0
UNIT NUMBER: NORMAL
UNSATURATED IRON BINDING CAPACITY: 151 UG/DL (ref 112–347)
WBC # BLD: 3.4 K/UL (ref 3.5–11.3)
WBC # BLD: 3.6 K/UL (ref 3.5–11.3)
WBC # BLD: ABNORMAL 10*3/UL
WBC # BLD: ABNORMAL 10*3/UL

## 2021-01-01 PROCEDURE — 1036F TOBACCO NON-USER: CPT | Performed by: INTERNAL MEDICINE

## 2021-01-01 PROCEDURE — 51798 US URINE CAPACITY MEASURE: CPT | Performed by: UROLOGY

## 2021-01-01 PROCEDURE — G8417 CALC BMI ABV UP PARAM F/U: HCPCS | Performed by: INTERNAL MEDICINE

## 2021-01-01 PROCEDURE — 80048 BASIC METABOLIC PNL TOTAL CA: CPT

## 2021-01-01 PROCEDURE — 96365 THER/PROPH/DIAG IV INF INIT: CPT

## 2021-01-01 PROCEDURE — 78306 BONE IMAGING WHOLE BODY: CPT

## 2021-01-01 PROCEDURE — 36430 TRANSFUSION BLD/BLD COMPNT: CPT

## 2021-01-01 PROCEDURE — 2580000003 HC RX 258: Performed by: INTERNAL MEDICINE

## 2021-01-01 PROCEDURE — G0103 PSA SCREENING: HCPCS

## 2021-01-01 PROCEDURE — G8484 FLU IMMUNIZE NO ADMIN: HCPCS | Performed by: INTERNAL MEDICINE

## 2021-01-01 PROCEDURE — G8427 DOCREV CUR MEDS BY ELIG CLIN: HCPCS | Performed by: INTERNAL MEDICINE

## 2021-01-01 PROCEDURE — 4040F PNEUMOC VAC/ADMIN/RCVD: CPT | Performed by: INTERNAL MEDICINE

## 2021-01-01 PROCEDURE — G8417 CALC BMI ABV UP PARAM F/U: HCPCS | Performed by: UROLOGY

## 2021-01-01 PROCEDURE — 7100000010 HC PHASE II RECOVERY - FIRST 15 MIN: Performed by: SURGERY

## 2021-01-01 PROCEDURE — 6360000002 HC RX W HCPCS: Performed by: INTERNAL MEDICINE

## 2021-01-01 PROCEDURE — 3700000001 HC ADD 15 MINUTES (ANESTHESIA): Performed by: SURGERY

## 2021-01-01 PROCEDURE — 1123F ACP DISCUSS/DSCN MKR DOCD: CPT | Performed by: INTERNAL MEDICINE

## 2021-01-01 PROCEDURE — 99212 OFFICE O/P EST SF 10 MIN: CPT | Performed by: UROLOGY

## 2021-01-01 PROCEDURE — 99213 OFFICE O/P EST LOW 20 MIN: CPT | Performed by: INTERNAL MEDICINE

## 2021-01-01 PROCEDURE — P9016 RBC LEUKOCYTES REDUCED: HCPCS

## 2021-01-01 PROCEDURE — 86901 BLOOD TYPING SEROLOGIC RH(D): CPT

## 2021-01-01 PROCEDURE — 99214 OFFICE O/P EST MOD 30 MIN: CPT | Performed by: INTERNAL MEDICINE

## 2021-01-01 PROCEDURE — 2500000003 HC RX 250 WO HCPCS: Performed by: NURSE ANESTHETIST, CERTIFIED REGISTERED

## 2021-01-01 PROCEDURE — 80053 COMPREHEN METABOLIC PANEL: CPT

## 2021-01-01 PROCEDURE — 96402 CHEMO HORMON ANTINEOPL SQ/IM: CPT

## 2021-01-01 PROCEDURE — 84153 ASSAY OF PSA TOTAL: CPT

## 2021-01-01 PROCEDURE — 2580000003 HC RX 258: Performed by: SURGERY

## 2021-01-01 PROCEDURE — 4040F PNEUMOC VAC/ADMIN/RCVD: CPT | Performed by: UROLOGY

## 2021-01-01 PROCEDURE — 88305 TISSUE EXAM BY PATHOLOGIST: CPT

## 2021-01-01 PROCEDURE — 85025 COMPLETE CBC W/AUTO DIFF WBC: CPT

## 2021-01-01 PROCEDURE — 96372 THER/PROPH/DIAG INJ SC/IM: CPT

## 2021-01-01 PROCEDURE — 86900 BLOOD TYPING SEROLOGIC ABO: CPT

## 2021-01-01 PROCEDURE — 86920 COMPATIBILITY TEST SPIN: CPT

## 2021-01-01 PROCEDURE — 36415 COLL VENOUS BLD VENIPUNCTURE: CPT

## 2021-01-01 PROCEDURE — 2709999900 HC NON-CHARGEABLE SUPPLY: Performed by: SURGERY

## 2021-01-01 PROCEDURE — 1123F ACP DISCUSS/DSCN MKR DOCD: CPT | Performed by: UROLOGY

## 2021-01-01 PROCEDURE — 6360000004 HC RX CONTRAST MEDICATION: Performed by: INTERNAL MEDICINE

## 2021-01-01 PROCEDURE — 6360000002 HC RX W HCPCS: Performed by: NURSE ANESTHETIST, CERTIFIED REGISTERED

## 2021-01-01 PROCEDURE — 7100000011 HC PHASE II RECOVERY - ADDTL 15 MIN: Performed by: SURGERY

## 2021-01-01 PROCEDURE — 99215 OFFICE O/P EST HI 40 MIN: CPT | Performed by: INTERNAL MEDICINE

## 2021-01-01 PROCEDURE — 74176 CT ABD & PELVIS W/O CONTRAST: CPT

## 2021-01-01 PROCEDURE — 3609012400 HC EGD TRANSORAL BIOPSY SINGLE/MULTIPLE: Performed by: SURGERY

## 2021-01-01 PROCEDURE — 83550 IRON BINDING TEST: CPT

## 2021-01-01 PROCEDURE — 99213 OFFICE O/P EST LOW 20 MIN: CPT

## 2021-01-01 PROCEDURE — 96366 THER/PROPH/DIAG IV INF ADDON: CPT

## 2021-01-01 PROCEDURE — A9503 TC99M MEDRONATE: HCPCS | Performed by: INTERNAL MEDICINE

## 2021-01-01 PROCEDURE — 3430000000 HC RX DIAGNOSTIC RADIOPHARMACEUTICAL: Performed by: INTERNAL MEDICINE

## 2021-01-01 PROCEDURE — 83540 ASSAY OF IRON: CPT

## 2021-01-01 PROCEDURE — 99024 POSTOP FOLLOW-UP VISIT: CPT | Performed by: UROLOGY

## 2021-01-01 PROCEDURE — 99214 OFFICE O/P EST MOD 30 MIN: CPT | Performed by: UROLOGY

## 2021-01-01 PROCEDURE — 86850 RBC ANTIBODY SCREEN: CPT

## 2021-01-01 PROCEDURE — G8427 DOCREV CUR MEDS BY ELIG CLIN: HCPCS | Performed by: UROLOGY

## 2021-01-01 PROCEDURE — 3700000000 HC ANESTHESIA ATTENDED CARE: Performed by: SURGERY

## 2021-01-01 PROCEDURE — 1036F TOBACCO NON-USER: CPT | Performed by: UROLOGY

## 2021-01-01 RX ORDER — FAMOTIDINE 20 MG/1
20 TABLET, FILM COATED ORAL NIGHTLY
Qty: 30 TABLET | Refills: 3 | Status: SHIPPED | OUTPATIENT
Start: 2021-01-01

## 2021-01-01 RX ORDER — SODIUM CHLORIDE 0.9 % (FLUSH) 0.9 %
5 SYRINGE (ML) INJECTION PRN
Status: CANCELLED | OUTPATIENT
Start: 2021-01-01

## 2021-01-01 RX ORDER — SODIUM CHLORIDE 9 MG/ML
INJECTION, SOLUTION INTRAVENOUS CONTINUOUS
Status: CANCELLED | OUTPATIENT
Start: 2021-01-01

## 2021-01-01 RX ORDER — PHENOL 1.4 %
1 AEROSOL, SPRAY (ML) MUCOUS MEMBRANE 2 TIMES DAILY
COMMUNITY

## 2021-01-01 RX ORDER — SODIUM CHLORIDE 9 MG/ML
20 INJECTION, SOLUTION INTRAVENOUS ONCE
Status: CANCELLED | OUTPATIENT
Start: 2021-01-01 | End: 2021-01-01

## 2021-01-01 RX ORDER — AMOXICILLIN 500 MG/1
1000 CAPSULE ORAL 2 TIMES DAILY
Qty: 56 CAPSULE | Refills: 0 | Status: SHIPPED | OUTPATIENT
Start: 2021-01-01 | End: 2021-11-16

## 2021-01-01 RX ORDER — ASPIRIN 81 MG/1
81 TABLET ORAL DAILY
COMMUNITY

## 2021-01-01 RX ORDER — EPINEPHRINE 1 MG/ML
0.3 INJECTION, SOLUTION, CONCENTRATE INTRAVENOUS PRN
Status: CANCELLED | OUTPATIENT
Start: 2021-01-01

## 2021-01-01 RX ORDER — OMEPRAZOLE 20 MG/1
20 CAPSULE, DELAYED RELEASE ORAL DAILY
Qty: 30 CAPSULE | Refills: 3 | Status: SHIPPED | OUTPATIENT
Start: 2021-01-01

## 2021-01-01 RX ORDER — FUROSEMIDE 40 MG/1
1 TABLET ORAL DAILY
COMMUNITY
Start: 2021-01-01

## 2021-01-01 RX ORDER — SODIUM CHLORIDE 0.9 % (FLUSH) 0.9 %
10 SYRINGE (ML) INJECTION PRN
Status: CANCELLED | OUTPATIENT
Start: 2021-01-01

## 2021-01-01 RX ORDER — METHYLPREDNISOLONE SODIUM SUCCINATE 125 MG/2ML
125 INJECTION, POWDER, LYOPHILIZED, FOR SOLUTION INTRAMUSCULAR; INTRAVENOUS ONCE
Status: CANCELLED | OUTPATIENT
Start: 2021-01-01 | End: 2021-01-01

## 2021-01-01 RX ORDER — FERROUS SULFATE 325(65) MG
325 TABLET ORAL DAILY
Qty: 60 TABLET | Refills: 2 | Status: SHIPPED | OUTPATIENT
Start: 2021-01-01

## 2021-01-01 RX ORDER — BICALUTAMIDE 50 MG/1
TABLET, FILM COATED ORAL
Qty: 30 TABLET | Refills: 1 | Status: ON HOLD | OUTPATIENT
Start: 2021-01-01 | End: 2021-01-01

## 2021-01-01 RX ORDER — HEPARIN SODIUM (PORCINE) LOCK FLUSH IV SOLN 100 UNIT/ML 100 UNIT/ML
500 SOLUTION INTRAVENOUS PRN
Status: CANCELLED | OUTPATIENT
Start: 2021-01-01

## 2021-01-01 RX ORDER — SODIUM CHLORIDE, SODIUM LACTATE, POTASSIUM CHLORIDE, CALCIUM CHLORIDE 600; 310; 30; 20 MG/100ML; MG/100ML; MG/100ML; MG/100ML
INJECTION, SOLUTION INTRAVENOUS CONTINUOUS
Status: DISCONTINUED | OUTPATIENT
Start: 2021-01-01 | End: 2021-01-01 | Stop reason: HOSPADM

## 2021-01-01 RX ORDER — SODIUM CHLORIDE 9 MG/ML
INJECTION, SOLUTION INTRAVENOUS PRN
Status: COMPLETED | OUTPATIENT
Start: 2021-01-01 | End: 2021-01-01

## 2021-01-01 RX ORDER — TRIAMCINOLONE ACETONIDE 1 MG/G
CREAM TOPICAL
COMMUNITY
Start: 2020-01-01

## 2021-01-01 RX ORDER — SODIUM CHLORIDE 9 MG/ML
20 INJECTION, SOLUTION INTRAVENOUS ONCE
Status: COMPLETED | OUTPATIENT
Start: 2021-01-01 | End: 2021-01-01

## 2021-01-01 RX ORDER — FERROUS SULFATE 325(65) MG
325 TABLET ORAL DAILY
Qty: 60 TABLET | Refills: 2 | Status: SHIPPED | OUTPATIENT
Start: 2021-01-01 | End: 2021-01-01 | Stop reason: SDUPTHER

## 2021-01-01 RX ORDER — SODIUM CHLORIDE 9 MG/ML
INJECTION, SOLUTION INTRAVENOUS PRN
Status: CANCELLED | OUTPATIENT
Start: 2021-01-01

## 2021-01-01 RX ORDER — SODIUM CHLORIDE 9 MG/ML
20 INJECTION, SOLUTION INTRAVENOUS ONCE
Status: DISCONTINUED | OUTPATIENT
Start: 2021-01-01 | End: 2021-01-01 | Stop reason: HOSPADM

## 2021-01-01 RX ORDER — AZITHROMYCIN 250 MG/1
250 TABLET, FILM COATED ORAL SEE ADMIN INSTRUCTIONS
Qty: 6 TABLET | Refills: 0 | Status: SHIPPED | OUTPATIENT
Start: 2021-01-01 | End: 2021-01-01

## 2021-01-01 RX ORDER — MELATONIN
2000 DAILY
Qty: 30 TABLET | Refills: 5 | Status: ON HOLD | OUTPATIENT
Start: 2021-01-01 | End: 2021-01-01

## 2021-01-01 RX ORDER — LIDOCAINE HYDROCHLORIDE 40 MG/ML
INJECTION, SOLUTION RETROBULBAR; TOPICAL PRN
Status: DISCONTINUED | OUTPATIENT
Start: 2021-01-01 | End: 2021-01-01 | Stop reason: SDUPTHER

## 2021-01-01 RX ORDER — ENZALUTAMIDE 40 MG/1
160 TABLET ORAL DAILY
Qty: 120 TABLET | Refills: 1 | Status: SHIPPED | OUTPATIENT
Start: 2021-01-01 | End: 2021-01-01 | Stop reason: SDUPTHER

## 2021-01-01 RX ORDER — DIPHENHYDRAMINE HYDROCHLORIDE 50 MG/ML
50 INJECTION INTRAMUSCULAR; INTRAVENOUS ONCE
Status: CANCELLED | OUTPATIENT
Start: 2021-01-01 | End: 2021-01-01

## 2021-01-01 RX ORDER — CLARITHROMYCIN 500 MG/1
500 TABLET, COATED ORAL 2 TIMES DAILY
Qty: 28 TABLET | Refills: 0 | Status: SHIPPED | OUTPATIENT
Start: 2021-01-01 | End: 2021-11-16

## 2021-01-01 RX ORDER — ENZALUTAMIDE 40 MG/1
120 TABLET ORAL DAILY
Qty: 90 TABLET | Refills: 3 | Status: SHIPPED | OUTPATIENT
Start: 2021-01-01 | End: 2021-01-01

## 2021-01-01 RX ORDER — IMIQUIMOD 12.5 MG/.25G
CREAM TOPICAL
COMMUNITY
Start: 2021-01-01

## 2021-01-01 RX ORDER — PROPOFOL 10 MG/ML
INJECTION, EMULSION INTRAVENOUS PRN
Status: DISCONTINUED | OUTPATIENT
Start: 2021-01-01 | End: 2021-01-01 | Stop reason: SDUPTHER

## 2021-01-01 RX ORDER — TC 99M MEDRONATE 20 MG/10ML
25 INJECTION, POWDER, LYOPHILIZED, FOR SOLUTION INTRAVENOUS
Status: COMPLETED | OUTPATIENT
Start: 2021-01-01 | End: 2021-01-01

## 2021-01-01 RX ADMIN — TC 99M MEDRONATE 25 MILLICURIE: 20 INJECTION, POWDER, LYOPHILIZED, FOR SOLUTION INTRAVENOUS at 15:10

## 2021-01-01 RX ADMIN — SODIUM CHLORIDE 20 ML/HR: 9 INJECTION, SOLUTION INTRAVENOUS at 11:08

## 2021-01-01 RX ADMIN — SODIUM CHLORIDE 20 ML/HR: 9 INJECTION, SOLUTION INTRAVENOUS at 10:15

## 2021-01-01 RX ADMIN — ZOLEDRONIC ACID 3 MG: 4 INJECTION, SOLUTION, CONCENTRATE INTRAVENOUS at 10:48

## 2021-01-01 RX ADMIN — PHENYLEPHRINE HYDROCHLORIDE 300 MCG: 10 INJECTION INTRAVENOUS at 09:13

## 2021-01-01 RX ADMIN — SODIUM CHLORIDE 20 ML/HR: 9 INJECTION, SOLUTION INTRAVENOUS at 13:20

## 2021-01-01 RX ADMIN — LIDOCAINE HYDROCHLORIDE 60 MG: 40 INJECTION, SOLUTION RETROBULBAR; TOPICAL at 09:10

## 2021-01-01 RX ADMIN — SODIUM CHLORIDE: 9 INJECTION, SOLUTION INTRAVENOUS at 12:24

## 2021-01-01 RX ADMIN — ZOLEDRONIC ACID 3 MG: 4 INJECTION, SOLUTION, CONCENTRATE INTRAVENOUS at 10:53

## 2021-01-01 RX ADMIN — SODIUM CHLORIDE 20 ML/HR: 9 INJECTION, SOLUTION INTRAVENOUS at 10:02

## 2021-01-01 RX ADMIN — SODIUM CHLORIDE 20 ML/HR: 9 INJECTION, SOLUTION INTRAVENOUS at 10:28

## 2021-01-01 RX ADMIN — LEUPROLIDE ACETATE 22.5 MG: KIT SUBCUTANEOUS at 11:15

## 2021-01-01 RX ADMIN — PROPOFOL 80 MG: 10 INJECTION, EMULSION INTRAVENOUS at 09:10

## 2021-01-01 RX ADMIN — SODIUM CHLORIDE, POTASSIUM CHLORIDE, SODIUM LACTATE AND CALCIUM CHLORIDE: 600; 310; 30; 20 INJECTION, SOLUTION INTRAVENOUS at 08:34

## 2021-01-01 RX ADMIN — ZOLEDRONIC ACID 3 MG: 4 INJECTION, SOLUTION, CONCENTRATE INTRAVENOUS at 13:32

## 2021-01-01 RX ADMIN — DARBEPOETIN ALFA 200 MCG: 200 INJECTION, SOLUTION INTRAVENOUS; SUBCUTANEOUS at 11:43

## 2021-01-01 RX ADMIN — ZOLEDRONIC ACID 3 MG: 4 INJECTION, SOLUTION, CONCENTRATE INTRAVENOUS at 11:16

## 2021-01-01 RX ADMIN — IOHEXOL 50 ML: 240 INJECTION, SOLUTION INTRATHECAL; INTRAVASCULAR; INTRAVENOUS; ORAL at 14:07

## 2021-01-01 RX ADMIN — ZOLEDRONIC ACID 3 MG: 4 INJECTION, SOLUTION, CONCENTRATE INTRAVENOUS at 11:07

## 2021-01-01 RX ADMIN — LEUPROLIDE ACETATE 22.5 MG: KIT at 13:59

## 2021-01-01 ASSESSMENT — PAIN SCALES - GENERAL
PAINLEVEL_OUTOF10: 0
PAINLEVEL_OUTOF10: 5
PAINLEVEL_OUTOF10: 0

## 2021-01-01 ASSESSMENT — PAIN DESCRIPTION - ORIENTATION: ORIENTATION: LEFT

## 2021-01-01 ASSESSMENT — PAIN DESCRIPTION - DESCRIPTORS: DESCRIPTORS: ACHING

## 2021-01-01 ASSESSMENT — PAIN - FUNCTIONAL ASSESSMENT: PAIN_FUNCTIONAL_ASSESSMENT: 0-10

## 2021-01-01 ASSESSMENT — PAIN DESCRIPTION - LOCATION: LOCATION: KNEE

## 2021-01-01 ASSESSMENT — PAIN DESCRIPTION - PAIN TYPE: TYPE: CHRONIC PAIN

## 2021-01-27 NOTE — PROGRESS NOTES
Zometa infusion complete. IV d/c'd, band aid to site. Patient tolerated infusion without any difficulty and left unit with daughter in stable condition.

## 2021-02-05 NOTE — TELEPHONE ENCOUNTER
Patient would like Rx renewed by Dr Rl Obregon for bicalutamide (CASODEX) 50 MG, to   [facility] [place].

## 2021-02-22 NOTE — PROGRESS NOTES
orthopnea, PND   Gastrointestinal: negative for nausea, vomiting, diarrhea, constipation, abdominal pain, Dysphagia, hematemesis and hematochezia   Genitourinary: negative for frequency, dysuria, nocturia, urinary incontinence, and hematuria   Integument: negative for rash, skin lesions, bruises.    Hematologic/Lymphatic: negative for easy bruising, bleeding, lymphadenopathy, or petechiae   Endocrine: negative for heat or cold intolerance,weight changes, change in bowel habits and hair loss   Musculoskeletal: negative for myalgias, arthralgias, pain, joint swelling,and bone pain   Neurological: negative for headaches, dizziness, seizures, weakness, numbness    PHYSICAL EXAM:      /60 (Site: Right Upper Arm, Position: Sitting, Cuff Size: Medium Adult)   Pulse 88   Temp 97.7 °F (36.5 °C) (Temporal)   Resp 16   Ht 5' 7\" (1.702 m)   Wt 187 lb 12.8 oz (85.2 kg)   SpO2 98%   BMI 29.41 kg/m²    Temp (24hrs), Av.5 °F (36.9 °C), Min:98.1 °F (36.7 °C), Max:98.8 °F (37.1 °C)    General appearance - well appearing, no in pain or distress   Mental status - alert and cooperative   Eyes - pupils equal and reactive, extraocular eye movements intact   Ears - bilateral TM's and external ear canals normal   Mouth - mucous membranes moist, pharynx normal without lesions   Neck - supple, no significant adenopathy   Lymphatics - no palpable lymphadenopathy, no hepatosplenomegaly   Chest - clear to auscultation, no wheezes, rales or rhonchi, symmetric air entry   Heart - normal rate, regular rhythm, normal S1, S2, no murmurs  Abdomen - soft, nontender, nondistended, no masses or organomegaly   Neurological - alert, oriented, normal speech, no focal findings or movement disorder noted   Musculoskeletal - no joint tenderness, deformity or swelling   Extremities - peripheral pulses normal, no pedal edema, no clubbing or cyanosis   Skin - normal coloration and turgor, no rashes, no suspicious skin lesions noted ,    DATA: Labs:   CBC:   No results for input(s): WBC, HGB, HCT, PLT in the last 72 hours. BMP:   No results for input(s): NA, K, CO2, BUN, CREATININE, LABGLOM, GLUCOSE in the last 72 hours. PT/INR: No results for input(s): PROTIME, INR in the last 72 hours. PATHOLOGY DATA  Surgical Pathology Report   Surgical Pathology   Collected:  08/31/20 3290    Lab status:  Final    Resulting lab:  [facility]    Value:  -- Diagnosis --   1.  PROSTATE, LLB, NEEDLE CORE BIOPSY:             -  ADENOCARCINOMA WITH ACINAR AND DUCTAL FEATURES, RAISSA   SCORE 4+4 = 8 (WHO/ISUP GRADE GROUP 4), INVOLVING 1 OF 1 CORE, 1.9 MM   DISCONTIGUOUS LENGTH, 24% TOTAL BIOPSY VOLUME.        -  PERINEURAL INVASION PRESENT. 2.  PROSTATE, LB, NEEDLE CORE BIOPSY:             -  ADENOCARCINOMA WITH ACINAR AND DUCTAL FEATURES, RAISSA   SCORE 4+3 = 7 (WHO/ISUP GRADE GROUP 3), INVOLVING 1 OF 1 CORE, 8.6 MM   DISCONTIGUOUS LENGTH 66% OF TOTAL BIOPSY VOLUME.        -  INTRADUCTAL CARCINOMA COMPONENT PRESENT. 3.  PROSTATE, LLM, NEEDLE CORE BIOPSY:             -  ADENOCARCINOMA WITH ACINAR AND DUCTAL FEATURES, RAISSA   SCORE 4+4 = 8 (WHO/ISUP GRADE GROUP 4), INVOLVING 1 OF 1 CORE, 7.9 MM   DISCONTIGUOUS LENGTH, 56% OF TOTAL BIOPSY VOLUME.        -  INTRADUCTAL CARCINOMA COMPONENT PRESENT.        -  PERINEURAL INVASION PRESENT.         4.  PROSTATE, LM, NEEDLE CORE BIOPSY:             -  ADENOCARCINOMA, ACINAR TYPE, RAISSA SCORE 4+3 = 7   (WHO/ISUP GRADE GROUP 3), INVOLVING 1 OF 1 CORE, 11.7 MM DISCONTIGUOUS   LENGTH, 78% OF TOTAL BIOPSY VOLUME.        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 5.  PROSTATE, LLA, NEEDLE CORE BIOPSY:             -  ADENOCARCINOMA WITH ACINAR AND DUCTAL FEATURES, RAISSA   SCORE 4+3 = 7 (WHO/ISUP GRADE GROUP 3), INVOLVING 1 OF 1 CORE, 1.7 MM   DISCONTIGUOUS LENGTH, 11% OF TOTAL BIOPSY VOLUME.        -  INTRADUCTAL CARCINOMA COMPONENT PRESENT.      6.  PROSTATE, LA, NEEDLE CORE BIOPSY:           -  ADENOCARCINOMA, ACINAR TYPE, RAISSA SCORE 4+3 = 7   (WHO/ISUP GRADE GROUP 3), INVOLVING 1 OF 1 CORE, 6.3 MM DISCONTIGUOUS   LENGTH, 42% OF TOTAL BIOPSY VOLUME. 7.  PROSTATE, RB, NEEDLE CORE BIOPSY:             -  ADENOCARCINOMA WITH ACINAR AND DUCTAL FEATURES, RAISSA   SCORE 4+4 = 8 (WHO/ISUP GRADE GROUP 4), INVOLVING 1 OF 1 CORE, 8.0 MM   DISCONTIGUOUS LENGTH, 53% OF TOTAL BIOPSY VOLUME.        -  INTRADUCTAL CARCINOMA COMPONENT PRESENT.        -  PERINEURAL INVASION PRESENT. 8.  PROSTATE, RLB, NEEDLE CORE BIOPSY:             -  ADENOCARCINOMA WITH ACINAR AND DUCTAL FEATURES, RAISSA   SCORE 4+4 = 8 (WHO/ISUP GRADE GROUP 4), INVOLVING 1 OF 1 CORE, 12.0 MM   DISCONTIGUOUS LENGTH, 80% OF TOTAL BIOPSY VOLUME.        -  INTRADUCTAL CARCINOMA COMPONENT PRESENT. 9.  PROSTATE, RM, NEEDLE CORE BIOPSY:             -  ADENOCARCINOMA WITH ACINAR AND DUCTAL FEATURES, RAISSA   SCORE 4+4 = 8 (WHO/ISUP GRADE GROUP 4), INVOLVING 1 OF 1 CORE, 13.2 MM   DISCONTIGUOUS LENGTH, 83% OF TOTAL BIOPSY VOLUME.        -  INTRADUCTAL CARCINOMA COMPONENT PRESENT. 10.  PROSTATE, RLM, NEEDLE CORE BIOPSY:             -  ADENOCARCINOMA WITH DUCTAL FEATURES, RAISSA SCORE 4+4 =   8 (WHO/ISUP GRADE GROUP 4), INVOLVING 1 OF 1 CORE, 3.3 MM   DISCONTIGUOUS LENGTH, 21% TOTAL BIOPSY VOLUME.        -  INTRADUCTAL CARCINOMA COMPONENT PRESENT.        -  PERINEURAL INVASION AND EXTRAPROSTATIC EXTENSION PRESENT. 11.  PROSTATE, RA, NEEDLE CORE BIOPSY:             -  ADENOCARCINOMA WITH ACINAR AND DUCTAL FEATURES, RAISSA   SCORE 4+5 = 9 (WHO/ISUP GRADE GROUP 5), INVOLVING 1 OF 1 CORE, 3.5 MM   DISCONTIGUOUS LENGTH, 25% OF TOTAL BIOPSY VOLUME.        -  INTRADUCTAL CARCINOMA COMPONENT PRESENT. 12.  PROSTATE, RLA, NEEDLE CORE BIOPSY:             -  ADENOCARCINOMA WITH DUCTAL FEATURES, RAISSA SCORE 4+4 =   8 (WHO/ISUP GRADE GROUP 4), INVOLVING 1 OF 1 CORE, 2.5 MM CONTIGUOUS   LENGTH, 16% OF TOTAL BIOPSY VOLUME. IMAGING DATA:  Bone scan 8/29/2020: Impression    Findings consistent with metastatic disease within the spine and posterior    pelvis.  Question small lesions within the ribs. CT chest abdomen pelvis 8/31/2020: Impression    Chest:         1. Osteopenia with superimposed diffuse degenerative changes could obscure    subtle metastatic disease. 2. Subtle sclerotic foci in T1 and T2 vertebral bodies likely metastatic. 3. No metastatic nodules. 4. Small bilateral pleural effusions right greater than left similar to prior. Abdomen pelvis:         1. Interval catheterization of the urinary bladder.  Thickened bladder wall    with new marked perivesical stranding.  Suspect cystitis. 2. Mild bilateral hydronephrosis slightly improved from prior but bilateral    hydroureter is unchanged. 3. A 4.4 cm infrarenal abdominal aortic aneurysm. IMPRESSION:   1. Metastatic prostate cancer: Elevated PSA with bony lesions suspicious for prostate cancer  2. Obstructive uropathy with bilateral ureteral hydronephrosis  3. CHF  4. ASHLEY/CKD  5. Bone lesions  6. Anemia: Status post IV iron infusion in the hospital now currently maintained on oral iron therapy. 7. Mild thrombocytopenia    RECOMMENDATIONS:  1. I reviewed the laboratory data, imaging studies, diagnosis, prognosis and treatment options with patient  2. Patient's bone scan is consistent with metastatic disease in the spine and posterior pelvis  3. Currently on androgen ablation therapy with Lupron and tolerating well  4. Continue Lupron  5. Patient will also be started on Zometa as a supportive therapy  6. We will check CBC, CMP and PSA level with each Lupron injection  7. Return to clinic in 3 months  8. Patient and family agreeable    Mykel Herron MD  Hematologist/Medical Oncologist    This note is created with the assistance of a speech recognition program.  While intending to generate a document that actually reflects the content of the visit, the document can still have some errors including those of syntax and sound a like substitutions which may escape proof reading. It such instances, actual meaning can be extrapolated by contextual diversion.

## 2021-03-03 NOTE — PROGRESS NOTES
Patient is here for Zometa infusion. Labs drawn and noted, okay to proceed with treatment. Vital signs stable. PIV started in [facility] after 2 attempts. Zometa infused as ordered and pt tolerated well. IV taken out and pt discharged ambulatory/stable.

## 2021-03-31 NOTE — PROGRESS NOTES
rounding on Infusion    Assessment: Patient reports doing well. No spiritual concerns at this time. Intervention: Engaged in conversation. Patient expressed appreciation for visit and offer of continued prayer. Plan: Chaplains are available on site or on call 24/7 for spiritual and emotional support.

## 2021-03-31 NOTE — PROGRESS NOTES
Infusion completed. IV discontinued Eligard given in left arm. Reported it stung being administered. Reported it felt OK at discharge will return in 4 weeks for Zometa only .

## 2021-03-31 NOTE — FLOWSHEET NOTE
03/31/21 1033   Encounter Summary   Services provided to: Patient   Referral/Consult From: [facility]   (3/31/21)   Complexity of Encounter Low   Length of Encounter 15 minutes   Spiritual/Islam   Type Spiritual support   Assessment Approachable   Intervention Active listening   Outcome Expressed gratitude;Engaged in conversation

## 2021-04-28 NOTE — PROGRESS NOTES
Patient on unit for zometa infusion. Labs were drawn on 3/30/21. IV accessed, Zometa infusing. Patient denies any complaints.

## 2021-04-28 NOTE — PROGRESS NOTES
Zometa infused. Patient tolerated without any difficulty. IV d/c'd, coban to site. Patient left unit in stable condition with wife.

## 2021-05-10 NOTE — TELEPHONE ENCOUNTER
Tani Lemons, let her know patient should be emptying bladder, this could be the cause of the frequent UTI

## 2021-05-10 NOTE — TELEPHONE ENCOUNTER
Patient's daughter, Mirtha Asif, called to update Dr Mock Notice that Waldemar Galan has had 2 UTIs that he has seen his PCP for. She is wondering if this is normal. Please call Mirtha Asif back at [phone].

## 2021-05-24 NOTE — PROGRESS NOTES
Dispense Refill    ferrous sulfate (IRON 325) 325 (65 Fe) MG tablet Take 1 tablet by mouth daily 60 tablet 2    calcium carbonate 600 MG TABS tablet Take 1 tablet by mouth 2 times daily      aspirin 81 MG EC tablet Take 81 mg by mouth daily      Multiple Vitamins-Minerals (THERAPEUTIC MULTIVITAMIN-MINERALS) tablet Take 1 tablet by mouth daily      sacubitril-valsartan (ENTRESTO) 24-26 MG per tablet Take 1 tablet by mouth 2 times daily      metoprolol succinate (TOPROL XL) 25 MG extended release tablet Take 1 tablet by mouth daily Replaces carvedilol. Hold for systolic BP less than 335 or Heart rate less than 55 30 tablet 3    potassium chloride (KLOR-CON) 10 MEQ extended release tablet Take 1 tablet by mouth daily Dose decreased to 10meq      rosuvastatin (CRESTOR) 10 MG tablet Take 10 mg by mouth daily      bicalutamide (CASODEX) 50 MG chemo tablet TAKE 1 TABLET BY MOUTH EVERY DAY (Patient not taking: Reported on 5/24/2021) 30 tablet 1    vitamin D3 (CHOLECALCIFEROL) 25 MCG (1000 UT) TABS tablet Take 2 tablets by mouth daily (Patient not taking: Reported on 5/24/2021) 30 tablet 5     No current facility-administered medications for this visit. Allergies:  Patient has no known allergies. Social History:   reports that he quit smoking about 55 years ago. His smoking use included cigarettes. He started smoking about 68 years ago. He has a 7.50 pack-year smoking history. He has never used smokeless tobacco. He reports current alcohol use. He reports that he does not use drugs. Family History: family history includes Heart Disease in his brother, father, and mother; Hypertension in his mother. REVIEW OF SYSTEMS:    Constitutional: No fever or chills.  No night sweats, no weight loss   Eyes: No eye discharge, double vision, or eye pain   HEENT: negative for sore mouth, sore throat, hoarseness and voice change   Respiratory: negative for cough , sputum, dyspnea, wheezing, hemoptysis, chest pain Cardiovascular: negative for chest pain, dyspnea, palpitations, orthopnea, PND   Gastrointestinal: negative for nausea, vomiting, diarrhea, constipation, abdominal pain, Dysphagia, hematemesis and hematochezia   Genitourinary: negative for frequency, dysuria, nocturia, urinary incontinence, and hematuria   Integument: negative for rash, skin lesions, bruises.    Hematologic/Lymphatic: negative for easy bruising, bleeding, lymphadenopathy, or petechiae   Endocrine: negative for heat or cold intolerance,weight changes, change in bowel habits and hair loss   Musculoskeletal: negative for myalgias, arthralgias, pain, joint swelling,and bone pain   Neurological: negative for headaches, dizziness, seizures, weakness, numbness    PHYSICAL EXAM:      /70 (Site: Left Upper Arm, Position: Sitting, Cuff Size: Large Adult)   Pulse 74   Ht 5' 7\" (1.702 m)   Wt 180 lb 3.2 oz (81.7 kg)   SpO2 98%   BMI 28.22 kg/m²    Temp (24hrs), Av.5 °F (36.9 °C), Min:98.1 °F (36.7 °C), Max:98.8 °F (37.1 °C)    General appearance - well appearing, no in pain or distress   Mental status - alert and cooperative   Eyes - pupils equal and reactive, extraocular eye movements intact   Ears - bilateral TM's and external ear canals normal   Mouth - mucous membranes moist, pharynx normal without lesions   Neck - supple, no significant adenopathy   Lymphatics - no palpable lymphadenopathy, no hepatosplenomegaly   Chest - clear to auscultation, no wheezes, rales or rhonchi, symmetric air entry   Heart - normal rate, regular rhythm, normal S1, S2, no murmurs  Abdomen - soft, nontender, nondistended, no masses or organomegaly   Neurological - alert, oriented, normal speech, no focal findings or movement disorder noted   Musculoskeletal - no joint tenderness, deformity or swelling   Extremities - peripheral pulses normal, no pedal edema, no clubbing or cyanosis   Skin - normal coloration and turgor, no rashes, no suspicious skin lesions noted ,    DATA:    Labs:   CBC:   No results for input(s): WBC, HGB, HCT, PLT in the last 72 hours. BMP:   No results for input(s): NA, K, CO2, BUN, CREATININE, LABGLOM, GLUCOSE in the last 72 hours. PT/INR: No results for input(s): PROTIME, INR in the last 72 hours. PATHOLOGY DATA  Surgical Pathology Report   Surgical Pathology   Collected:  08/31/20 1530    Lab status:  Final    Resulting lab:  [facility]    Value:  -- Diagnosis --   1.  PROSTATE, LLB, NEEDLE CORE BIOPSY:             -  ADENOCARCINOMA WITH ACINAR AND DUCTAL FEATURES, RAISSA   SCORE 4+4 = 8 (WHO/ISUP GRADE GROUP 4), INVOLVING 1 OF 1 CORE, 1.9 MM   DISCONTIGUOUS LENGTH, 24% TOTAL BIOPSY VOLUME.        -  PERINEURAL INVASION PRESENT. 2.  PROSTATE, LB, NEEDLE CORE BIOPSY:             -  ADENOCARCINOMA WITH ACINAR AND DUCTAL FEATURES, RAISSA   SCORE 4+3 = 7 (WHO/ISUP GRADE GROUP 3), INVOLVING 1 OF 1 CORE, 8.6 MM   DISCONTIGUOUS LENGTH 66% OF TOTAL BIOPSY VOLUME.        -  INTRADUCTAL CARCINOMA COMPONENT PRESENT. 3.  PROSTATE, LLM, NEEDLE CORE BIOPSY:             -  ADENOCARCINOMA WITH ACINAR AND DUCTAL FEATURES, RAISSA   SCORE 4+4 = 8 (WHO/ISUP GRADE GROUP 4), INVOLVING 1 OF 1 CORE, 7.9 MM   DISCONTIGUOUS LENGTH, 56% OF TOTAL BIOPSY VOLUME.        -  INTRADUCTAL CARCINOMA COMPONENT PRESENT.        -  PERINEURAL INVASION PRESENT.         4.  PROSTATE, LM, NEEDLE CORE BIOPSY:             -  ADENOCARCINOMA, ACINAR TYPE, RAISSA SCORE 4+3 = 7   (WHO/ISUP GRADE GROUP 3), INVOLVING 1 OF 1 CORE, 11.7 MM DISCONTIGUOUS   LENGTH, 78% OF TOTAL BIOPSY VOLUME.        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 5.  PROSTATE, LLA, NEEDLE CORE BIOPSY:             -  ADENOCARCINOMA WITH ACINAR AND DUCTAL FEATURES, RAISSA   SCORE 4+3 = 7 (WHO/ISUP GRADE GROUP 3), INVOLVING 1 OF 1 CORE, 1.7 MM   DISCONTIGUOUS LENGTH, 11% OF TOTAL BIOPSY VOLUME.        -  INTRADUCTAL CARCINOMA COMPONENT PRESENT.      6.  PROSTATE, LA, BIOPSY VOLUME. IMAGING DATA:  Bone scan 8/29/2020: Impression    Findings consistent with metastatic disease within the spine and posterior    pelvis.  Question small lesions within the ribs. CT chest abdomen pelvis 8/31/2020: Impression    Chest:         1. Osteopenia with superimposed diffuse degenerative changes could obscure    subtle metastatic disease. 2. Subtle sclerotic foci in T1 and T2 vertebral bodies likely metastatic. 3. No metastatic nodules. 4. Small bilateral pleural effusions right greater than left similar to prior. Abdomen pelvis:         1. Interval catheterization of the urinary bladder.  Thickened bladder wall    with new marked perivesical stranding.  Suspect cystitis. 2. Mild bilateral hydronephrosis slightly improved from prior but bilateral    hydroureter is unchanged. 3. A 4.4 cm infrarenal abdominal aortic aneurysm. IMPRESSION:   1. Metastatic prostate cancer: Elevated PSA with bony lesions suspicious for prostate cancer  2. Obstructive uropathy with bilateral ureteral hydronephrosis  3. CHF  4. ASHLEY/CKD  5. Bone lesions  6. Anemia: Status post IV iron infusion in the hospital now currently maintained on oral iron therapy. 7. Mild thrombocytopenia    RECOMMENDATIONS:  1. I reviewed the laboratory data, imaging studies, diagnosis, prognosis and treatment options with patient  2. Patient's bone scan is consistent with metastatic disease in the spine and posterior pelvis  3. Currently on androgen deprivation therapy with Lupron and tolerating well  4. Continue Lupron  5. I will change his Zometa to every 6 weeks  6. I will check his PSA with his next Zometa/Lupron  7. Return to clinic 6 weeks  8. Patient and family agreeable    Mykel Bowser MD  Hematologist/Medical Oncologist    This note is created with the assistance of a speech recognition program.  While intending to generate a document that actually reflects the content of the visit, the document

## 2021-05-26 NOTE — PROGRESS NOTES
Patient Came into unit Calcium 8.4 and creatinine 1.5. Reported to Dr. Bean Shafer and he wanted patient to hold treatment this time reschedule as planned. Will return June 23 rd for eligard and Zometa. Discharged from unit in stable condition.

## 2021-06-23 NOTE — PROGRESS NOTES
Nephrology Progress Note    Viviana Watts, [address] [facility]      Patient is seen back in 6-month return visit on 6/23/2021 for chronic kidney disease stage IIIb. Creatinine has been stable over the last 9 months, including labs done 5/24/2021. Creatinine was 1.5 with BUN of 22, calcium 8.4, sodium 139 with potassium 5.1 chloride 108 CO2 20. Estimated GFR is 44 mL/m. The patient wanted to know if he should increase his Germania Liberty because he is on a low dose. However, with his high normal potassium going up on theEntresto would risk hyperkalemia. I do not believe he should change his current dose. Preston Humphreys He has undergone greenlight laser procedure of the prostate on 12/11/2020 for BPH. Panchal catheter was removed on 12/14/2020. He is still having a little bit of blood in the urine. He is still having some urinary incontinence. He does note a relatively frequent urination with small volumes, although the symptoms appear to be getting a little bit better. The amount of blood in the urine is lessening, per the patient. The patient will need routine every 6-month follow-up with nephrology here in the clinic. The patient was seen in September 2020 after a significant acute kidney injury episode prompting admission to [address] [place] [address]. The patient has a history of chronic kidney disease stage III and a history of ischemic cardiomyopathy previously on Entresto. Apparently, according the patient's daughter, the patient had improvement in ejection fraction from the range of 30 to 35% up to a range of 40 to 45% on that medication. The patient was found to have a BUN of 98 and creatinine 7.5 with a potassium of 5.9 on 8/25/2020. Those labs prompted transfer as the patient had a creatinine back in July 2020 of 1.96 with normal potassium. The patient ended up having a Panchal catheter placed because of obstructive uropathy.   The patient now has a chronic indwelling Panchal catheter. He also had a prostate biopsy which showed prostate cancer. He is now being seen in the outpatient by Dr. Gertrude Jeronimo urology and also by Dr. Duane Katz, oncology. The hope is that eventually the patient will be able to have the Panchal catheter removed if he has enough bladder function. There is no set timeframe for removal of the Panchal catheter. Lab data following the discharge from 9/13/2020 showed a BUN 24 and creatinine 1.5 with normal electrolytes, including a potassium of 4.9. The patient recently saw his cardiologist, Dr. Annabella Reyes, and there was discussion about the timing as to when to get the patient back on his Entresto. The patient's dose was . Dr. Troy Anderson has put the patient back on Entresto on the lowest dose 2426. Patient feels well. He has no edema. He has no shortness of breath. He is on room air. He has no chest pain. Appetite is good. Urinary symptoms are as dictated above. Again, he is without the Panchal catheter since earlier this week 12/14/2020. He moves his bowels about every day. Medications are reviewed today.     Chief Complaint   Patient presents with    Chronic Kidney Disease     6 month follow up no labs       Patient Active Problem List   Diagnosis    Hydroureteronephrosis    Hyperkalemia    Coronary artery disease involving coronary bypass graft of native heart without angina pectoris    Benign prostatic hyperplasia with urinary obstruction    Abdominal aortic aneurysm (AAA) without rupture ([facility] [address]    Coronary arteriosclerosis in native artery    Hypercholesterolemia    Mixed hyperlipidemia    Upper gastrointestinal bleeding    Chronic systolic heart failure (HCC)    Benign hypertension with chronic kidney disease, stage II    LBBB (left bundle branch block)    Gross hematuria    CKD (chronic kidney disease) stage 3, GFR 30-59 ml/min (HCC)    Urinary obstruction    Prostate cancer, primary, with metastasis from prostate to other site [facility])    ASHLEY (acute kidney injury) ([facility] [address]    Elevated PSA       REVIEW OF SYSTEMS     Constitutional: No fever, no chills, no lethargy, no weakness. His appetite is improved. HEENT:  No headache, ear pain, itchy eyes, nasal discharge or sore throat. Cardiac:  No chest pain, shortness of breath, orthopnea or PND. Chest:   No cough, phlegm or wheezing. Abdomen:  No abdominal pain, nausea or vomiting. Neuro:  No focal weakness, abnormal movements or seizure like activity. Skin:   No rashes, no itching. :   Greenlight laser procedure on 12/11/202) for BPH  Extremities:  No swelling or joint pains. OBJECTIVE      Vitals:    06/23/21 1049   BP: (!) 112/58   Pulse: 87   SpO2: 98%     Wt Readings from Last 2 Encounters:   06/23/21 184 lb (83.5 kg)   05/24/21 180 lb 3.2 oz (81.7 kg)        PHYSICAL EXAM      GENERAL APPEARANCE:Awake, alert, in no acute distress  SKIN: warm and dry, no rash or erythema  EYES: conjunctivae normal and sclera anicteric  ENT:  moist mucus membranes   NECK: No obvious JVD, midline trachea, no accessory muscle use  PULMONARY: Good bilateral air entry and clear to auscultation bilaterally  and without wheezes or rales or rhonchi  CARDIOVASCULAR: Regular rate and rhythm with positive S1 and S2 no rubs  ABDOMEN: Soft and not tender not distended with active bowel sounds, no flank discomfort to palpation  EXTREMITIES: No pitting pretibial or ankle edema bilaterally    CURRENT MEDICATIONS      Current Outpatient Medications   Medication Sig Dispense Refill    imiquimod (ALDARA) 5 % cream APPLY TO THE FACE AT NIGHT MON TO FRI FOR 6 WEEKS VERY SPARINGLY / [address] OFF IN THE MORNING.       triamcinolone (KENALOG) 0.1 % cream APPLY TWICE DAILY TO ITCHY RED AREAS ON AFFECTED ARMS FOR UP TO TWO WEEKS AT A TIME      ferrous sulfate (IRON 325) 325 (65 Fe) MG tablet Take 1 tablet by mouth daily 60 tablet 2    bicalutamide (CASODEX) 50 MG chemo tablet TAKE 1 TABLET BY MOUTH EVERY DAY 30 tablet 1    calcium carbonate 600 MG TABS tablet Take 1 tablet by mouth 2 times daily      aspirin 81 MG EC tablet Take 81 mg by mouth daily      Multiple Vitamins-Minerals (THERAPEUTIC MULTIVITAMIN-MINERALS) tablet Take 1 tablet by mouth daily      sacubitril-valsartan (ENTRESTO) 24-26 MG per tablet Take 1 tablet by mouth 2 times daily      metoprolol succinate (TOPROL XL) 25 MG extended release tablet Take 1 tablet by mouth daily Replaces carvedilol. Hold for systolic BP less than 968 or Heart rate less than 55 30 tablet 3    potassium chloride (KLOR-CON) 10 MEQ extended release tablet Take 1 tablet by mouth daily Dose decreased to 10meq      rosuvastatin (CRESTOR) 10 MG tablet Take 10 mg by mouth daily      vitamin D3 (CHOLECALCIFEROL) 25 MCG (1000 UT) TABS tablet Take 2 tablets by mouth daily (Patient not taking: Reported on 6/23/2021) 30 tablet 5     No current facility-administered medications for this visit. LABS      No results for input(s): WBC, RBC, HGB, HCT, MCV, MCH, MCHC, RDW, PLT, MPV in the last 72 hours. BMP: No results for input(s): NA, K, CL, CO2, BUN, CREATININE, GLUCOSE, CALCIUM in the last 72 hours. Phosphorus:   No results for input(s): PHOS in the last 72 hours. Magnesium:  No results for input(s): MG in the last 72 hours. Albumin:  No results for input(s): LABALBU in the last 72 hours. BNP:    No results found for: BNP  ESTEPHANIA:      Lab Results   Component Value Date    ESTEPHANIA NEGATIVE 08/30/2020     SPEP:  Lab Results   Component Value Date    PROT 8.2 11/25/2020    ALBCAL 3.2 08/29/2020    ALBPCT 59 08/29/2020    LABALPH 0.2 08/29/2020    LABALPH 0.7 08/29/2020    A1PCT 3 08/29/2020    A2PCT 13 08/29/2020    LABBETA 0.6 08/29/2020    BETAPCT 11 08/29/2020    GAMGLOB 0.8 08/29/2020    GGPCT 14 08/29/2020    PATH ELECTRONICALLY SIGNED. Thaddeus Puckett M.D. 08/29/2020    PATH ELECTRONICALLY SIGNED.  Thaddeusmaddison Puckett M.D. 08/29/2020     UPEP:     Lab Results Component Value Date    LABPE  08/29/2020     ELEVATED PROTEIN CONCENTRATION. MOST SERUM PROTEINS ARE DETECTED IN THIS URINE. USUALLY OBSERVED WITH MARKEDLY INCREASED NON-SELECTIVE GLOMERULAR PERMEABILITY (i.e. SEVERE GLOMERULAR DISEASE), CONTAMINATION OF     C3:     Lab Results   Component Value Date    C3 101 08/29/2020     C4:     Lab Results   Component Value Date    C4 39 08/29/2020     MPO ANCA:   No results found for: MPO  PR3 ANCA:   No results found for: PR3  Anti-GBM:   No results found for: GBMABIGG  Hep BsAg:         Lab Results   Component Value Date    HEPBSAG NONREACTIVE 08/30/2020     Hep C AB:          Lab Results   Component Value Date    HEPCAB NONREACTIVE 08/30/2020         URINALYSIS/URINE CHEMISTRIES      Lab Results   Component Value Date    NITRU POSITIVE 08/29/2020    COLORU RED 08/29/2020    PHUR 7.5 08/29/2020    WBCUA 0 TO 2 08/29/2020    RBCUA TOO NUMEROUS TO COUNT 08/29/2020    MUCUS NOT REPORTED 08/29/2020    TRICHOMONAS NOT REPORTED 08/29/2020    YEAST NOT REPORTED 08/29/2020    BACTERIA NOT REPORTED 08/29/2020    SPECGRAV 1.015 08/29/2020    LEUKOCYTESUR SMALL 08/29/2020    UROBILINOGEN ELEVATED 08/29/2020    BILIRUBINUR NEGATIVE 08/29/2020    GLUCOSEU NEGATIVE 08/29/2020    KETUA TRACE 08/29/2020    AMORPHOUS NOT REPORTED 08/29/2020     Urine Sodium:     Lab Results   Component Value Date    ROXANNA 98 08/29/2020     Urine Potassium:  No results found for: KUR  Urine Chloride:  No results found for: CLUR  Urine Osmolarity:   Lab Results   Component Value Date    OSMOU 326 08/29/2020     Urine Protein:     Lab Results   Component Value Date     08/29/2020     Urine Creatinine:     Lab Results   Component Value Date    LABCREA 37.9 08/29/2020     UPC:     Urine Eosinophils:  No components found for: UEOS      RADIOLOGY             ASSESSMENT     1.  Chronic Kidney Disease stage IIIb and appears likely secondary to a degree of nephrosclerosis versus ischemic nephropathy versus residual renal dysfunction after a recent severe acute kidney injury episode in the setting of bladder outlet obstruction in the setting of prostate cancer. Last creatinine 1.5 for estimated GFR of 44 mL/min from 5/24/2021. Although sodium is 139 with potassium 5.1 chloride 108 CO2 20.  2.  Anemia of chronic disease and blood loss with last hemoglobin of 10.4  3. Ischemic cardiomyopathy with the patient on twice daily Entresto. 4.  Hypotension during the patient's most recent hospitalization for acute kidney injury and urinary tract outlet obstruction with the patient now having some improvement in blood pressure and not requiring the midodrine. 5.  Recently diagnosed prostate cancer and BPH with bladder outlet obstruction obstruction now with chronic indwelling Panchal catheter and seeing urology and oncology  6. No evidence of fluid overload  7. Acute on chronic kidney injury secondary to urinary tract obstruction, improved  8. Hypovitaminosis D, on a supplement  9. Indy deconditioning, and did recommend sitting to standing exercise 3 times a day about 5 steps at a time. He is been having some issues with balance and having to hold onto items to actually walk. No recent falls but he is at high risk. PLAN     1. Medication changes today  2. Specifically, no change in the Entresto dose as of the high normal potassium setting of CK 83. 3.  Monitor urology and oncology evaluation and treatment plans  4. Return to see me in 6 months   5. CBC, CMP and PSA are ordered for the future      Please do not hesitate to call with questions.     Electronically signed by Adelina De Paz MD on 6/23/2021 at 11:02 AM

## 2021-07-12 NOTE — PATIENT INSTRUCTIONS
Change zometa to every 6 weeks (next on 8/4)  PSA add to next lab  CT scan and bone scan prior to next visit   RTc August 9

## 2021-07-12 NOTE — PROGRESS NOTES
Writer scheduled scans prior to followup.  Spoke to Umm in infusion room and notified for need to change Zometa to 08/04/2021

## 2021-07-12 NOTE — PROGRESS NOTES
Today's Date: 7/12/2021  Patient Name: Paola Lindsey  Patient's age: 80 y.o., 5/21/1933    DIAGNOSIS: metastatic prostate CA to the spine and ribs   Current treatment  On Lupron and Zometa October 2020  Recent PSA has increased from 10 to [address]:   Chief Complaint   Patient presents with    Prostate Cancer     Follow up with labs   Shortness of breath    INTERVAL HISTORY:    Patient is returning for follow-up visit and to discuss lab results and further recommendations. Starting Lupron well. He reports some joint pain and bone pain after Zometa infusion. He does have some fatigue. He denies any new bone pain or back pain. He has left flank pain off and on started 2 days ago. Denies any diarrhea, blood in stool. His recent PSA has increased from 10 up to 50. During this visit patient's allergy, social, medical, surgical history and medications were reviewed and updated. HISTORY OF PRESENT ILLNESS:    This is a 80-year-old male who was admitted from [facility] for complaints of worsening shortness of breath and swelling in lower extremities. Patient has history of CHF and been following with cardiologist.  Patient was also noted to have acute kidney injury on top of chronic kidney disease and being managed for acute exacerbation of systolic heart failure. Patient was noted to have high PSA of 97.6 and also sclerotic lesion in L1 vertebral body and right iliac bone suspicious for metastatic disease. Oncology consult for further opinion. Past Medical History:   has a past medical history of AAA (abdominal aortic aneurysm) ([address] BPH (benign prostatic hyperplasia), CAD (coronary artery disease), GIB (gastrointestinal bleeding), Hyperlipidemia, Skin cancer, basal cell, Stroke (cerebrum) ([address] and Systolic heart failure ([address] Past Surgical History:   has a past surgical history that includes Coronary artery bypass graft; Vocal Cord Surgery; Skin cancer excision;  Eye surgery; Prostate biopsy (08/31/2020); Prostate biopsy (N/A, 8/31/2020); Cystocopy (12/11/2020); Prostate surgery (12/11/2020); and TURP (N/A, 12/11/2020). Medications:    Current Outpatient Medications   Medication Sig Dispense Refill    imiquimod (ALDARA) 5 % cream APPLY TO THE FACE AT NIGHT MON TO FRI FOR 6 WEEKS VERY SPARINGLY / WASH OFF IN THE MORNING.  triamcinolone (KENALOG) 0.1 % cream APPLY TWICE DAILY TO ITCHY RED AREAS ON AFFECTED ARMS FOR UP TO TWO WEEKS AT A TIME      ferrous sulfate (IRON 325) 325 (65 Fe) MG tablet Take 1 tablet by mouth daily 60 tablet 2    calcium carbonate 600 MG TABS tablet Take 1 tablet by mouth 2 times daily      aspirin 81 MG EC tablet Take 81 mg by mouth daily      Multiple Vitamins-Minerals (THERAPEUTIC MULTIVITAMIN-MINERALS) tablet Take 1 tablet by mouth daily      sacubitril-valsartan (ENTRESTO) 24-26 MG per tablet Take 1 tablet by mouth 2 times daily      metoprolol succinate (TOPROL XL) 25 MG extended release tablet Take 1 tablet by mouth daily Replaces carvedilol. Hold for systolic BP less than 538 or Heart rate less than 55 30 tablet 3    potassium chloride (KLOR-CON) 10 MEQ extended release tablet Take 1 tablet by mouth daily Dose decreased to 10meq      rosuvastatin (CRESTOR) 10 MG tablet Take 10 mg by mouth daily      bicalutamide (CASODEX) 50 MG chemo tablet TAKE 1 TABLET BY MOUTH EVERY DAY (Patient not taking: Reported on 7/12/2021) 30 tablet 1    vitamin D3 (CHOLECALCIFEROL) 25 MCG (1000 UT) TABS tablet Take 2 tablets by mouth daily (Patient not taking: Reported on 6/23/2021) 30 tablet 5     No current facility-administered medications for this visit. Allergies:  Patient has no known allergies. Social History:   reports that he quit smoking about 55 years ago. His smoking use included cigarettes. He started smoking about 68 years ago. He has a 7.50 pack-year smoking history.  He has never used smokeless tobacco. He reports current alcohol use. He reports that he does not use drugs. Family History: family history includes Heart Disease in his brother, father, and mother; Hypertension in his mother. REVIEW OF SYSTEMS:    Constitutional: No fever or chills. No night sweats, no weight loss   Eyes: No eye discharge, double vision, or eye pain   HEENT: negative for sore mouth, sore throat, hoarseness and voice change   Respiratory: negative for cough , sputum, dyspnea, wheezing, hemoptysis, chest pain   Cardiovascular: negative for chest pain, dyspnea, palpitations, orthopnea, PND   Gastrointestinal: negative for nausea, vomiting, diarrhea, constipation, abdominal pain, Dysphagia, hematemesis and hematochezia   Genitourinary: negative for frequency, dysuria, nocturia, urinary incontinence, and hematuria   Integument: negative for rash, skin lesions, bruises.    Hematologic/Lymphatic: negative for easy bruising, bleeding, lymphadenopathy, or petechiae   Endocrine: negative for heat or cold intolerance,weight changes, change in bowel habits and hair loss   Musculoskeletal: negative for myalgias, arthralgias, pain, joint swelling,and bone pain   Neurological: negative for headaches, dizziness, seizures, weakness, numbness    PHYSICAL EXAM:      /72 (Site: Left Upper Arm, Position: Sitting)   Pulse 86   Temp 97.2 °F (36.2 °C) (Temporal)   Ht 5' 7\" (1.702 m)   Wt 181 lb (82.1 kg)   SpO2 98%   BMI 28.35 kg/m²    Temp (24hrs), Av.5 °F (36.9 °C), Min:98.1 °F (36.7 °C), Max:98.8 °F (37.1 °C)    General appearance - well appearing, no in pain or distress   Mental status - alert and cooperative   Eyes - pupils equal and reactive, extraocular eye movements intact   Ears - bilateral TM's and external ear canals normal   Mouth - mucous membranes moist, pharynx normal without lesions   Neck - supple, no significant adenopathy   Lymphatics - no palpable lymphadenopathy, no hepatosplenomegaly   Chest - clear to auscultation, no wheezes, rales or rhonchi, symmetric air entry   Heart - normal rate, regular rhythm, normal S1, S2, no murmurs  Abdomen - soft, nontender, nondistended, no masses or organomegaly   Neurological - alert, oriented, normal speech, no focal findings or movement disorder noted   Musculoskeletal - no joint tenderness, deformity or swelling   Extremities - peripheral pulses normal, no pedal edema, no clubbing or cyanosis   Skin - normal coloration and turgor, no rashes, no suspicious skin lesions noted ,    DATA:    Labs:   CBC:   No results for input(s): WBC, HGB, HCT, PLT in the last 72 hours. BMP:   No results for input(s): NA, K, CO2, BUN, CREATININE, LABGLOM, GLUCOSE in the last 72 hours. PT/INR: No results for input(s): PROTIME, INR in the last 72 hours. PATHOLOGY DATA  Surgical Pathology Report   Surgical Pathology   Collected:  08/31/20 1530    Lab status:  Final    Resulting lab:  [facility]    Value:  -- Diagnosis --   1.  PROSTATE, LLB, NEEDLE CORE BIOPSY:             -  ADENOCARCINOMA WITH ACINAR AND DUCTAL FEATURES, RAISSA   SCORE 4+4 = 8 (WHO/ISUP GRADE GROUP 4), INVOLVING 1 OF 1 CORE, 1.9 MM   DISCONTIGUOUS LENGTH, 24% TOTAL BIOPSY VOLUME.        -  PERINEURAL INVASION PRESENT. 2.  PROSTATE, LB, NEEDLE CORE BIOPSY:             -  ADENOCARCINOMA WITH ACINAR AND DUCTAL FEATURES, RAISSA   SCORE 4+3 = 7 (WHO/ISUP GRADE GROUP 3), INVOLVING 1 OF 1 CORE, 8.6 MM   DISCONTIGUOUS LENGTH 66% OF TOTAL BIOPSY VOLUME.        -  INTRADUCTAL CARCINOMA COMPONENT PRESENT.      3.  PROSTATE, LLM, NEEDLE CORE BIOPSY:             -  ADENOCARCINOMA WITH ACINAR AND DUCTAL FEATURES, RAISSA   SCORE 4+4 = 8 (WHO/ISUP GRADE GROUP 4), INVOLVING 1 OF 1 CORE, 7.9 MM   DISCONTIGUOUS LENGTH, 56% OF TOTAL BIOPSY VOLUME.        -  INTRADUCTAL CARCINOMA COMPONENT PRESENT.        -  PERINEURAL INVASION PRESENT.         4.  PROSTATE, LM, NEEDLE CORE BIOPSY:             -  ADENOCARCINOMA, ACINAR TYPE, RAISSA SCORE 4+3 = 7   (WHO/ISUP GRADE GROUP 3), INVOLVING 1 OF 1 CORE, 11.7 MM DISCONTIGUOUS   LENGTH, 78% OF TOTAL BIOPSY VOLUME.        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 5.  PROSTATE, LLA, NEEDLE CORE BIOPSY:             -  ADENOCARCINOMA WITH ACINAR AND DUCTAL FEATURES, RAISSA   SCORE 4+3 = 7 (WHO/ISUP GRADE GROUP 3), INVOLVING 1 OF 1 CORE, 1.7 MM   DISCONTIGUOUS LENGTH, 11% OF TOTAL BIOPSY VOLUME.        -  INTRADUCTAL CARCINOMA COMPONENT PRESENT. 6.  PROSTATE, LA, NEEDLE CORE BIOPSY:             -  ADENOCARCINOMA, ACINAR TYPE, RAISSA SCORE 4+3 = 7   (WHO/ISUP GRADE GROUP 3), INVOLVING 1 OF 1 CORE, 6.3 MM DISCONTIGUOUS   LENGTH, 42% OF TOTAL BIOPSY VOLUME. 7.  PROSTATE, RB, NEEDLE CORE BIOPSY:             -  ADENOCARCINOMA WITH ACINAR AND DUCTAL FEATURES, RAISSA   SCORE 4+4 = 8 (WHO/ISUP GRADE GROUP 4), INVOLVING 1 OF 1 CORE, 8.0 MM   DISCONTIGUOUS LENGTH, 53% OF TOTAL BIOPSY VOLUME.        -  INTRADUCTAL CARCINOMA COMPONENT PRESENT.        -  PERINEURAL INVASION PRESENT. 8.  PROSTATE, RLB, NEEDLE CORE BIOPSY:             -  ADENOCARCINOMA WITH ACINAR AND DUCTAL FEATURES, RAISSA   SCORE 4+4 = 8 (WHO/ISUP GRADE GROUP 4), INVOLVING 1 OF 1 CORE, 12.0 MM   DISCONTIGUOUS LENGTH, 80% OF TOTAL BIOPSY VOLUME.        -  INTRADUCTAL CARCINOMA COMPONENT PRESENT. 9.  PROSTATE, RM, NEEDLE CORE BIOPSY:             -  ADENOCARCINOMA WITH ACINAR AND DUCTAL FEATURES, RAISSA   SCORE 4+4 = 8 (WHO/ISUP GRADE GROUP 4), INVOLVING 1 OF 1 CORE, 13.2 MM   DISCONTIGUOUS LENGTH, 83% OF TOTAL BIOPSY VOLUME.        -  INTRADUCTAL CARCINOMA COMPONENT PRESENT. 10.  PROSTATE, RLM, NEEDLE CORE BIOPSY:             -  ADENOCARCINOMA WITH DUCTAL FEATURES, RAISSA SCORE 4+4 =   8 (WHO/ISUP GRADE GROUP 4), INVOLVING 1 OF 1 CORE, 3.3 MM   DISCONTIGUOUS LENGTH, 21% TOTAL BIOPSY VOLUME.        -  INTRADUCTAL CARCINOMA COMPONENT PRESENT.        -  PERINEURAL INVASION AND EXTRAPROSTATIC EXTENSION PRESENT.      Jad Beckford,

## 2021-07-19 NOTE — PROGRESS NOTES
ANNMARIE Cleary MD        [address] [address]  Dept: [phone]  Dept Fax: [phone]  Loc: [phone]      [facility] Urology Office Note  Follow up Visit    Patient:  Esther Li  YOB: 1933  Date: 7/19/2021    The patient is a 80 y.o. male who presents today for evaluation of the following problems: prostate cancer  Chief Complaint   Patient presents with    Prostate Cancer     6 mo f/u        HISTORY OF PRESENT ILLNESS:     Prostate cancer  Advanced prostate cancer  On ADT  PSA rising  Seeing Dr Tanisha Lombardi    BPH  Hx of greenlight  Intermittent weak stream  Without catheter  Sometimes split streams      Summary of Previous Records:  RECOMMENDATIONS:  1. I reviewed the laboratory data, imaging studies, diagnosis, prognosis and treatment options with patient  2. Patient's bone scan is consistent with metastatic disease in the spine and posterior pelvis  3. Currently on androgen deprivation therapy with Lupron and tolerating well  4. Continue Lupron  5. I will change his Zometa to every 6 weeks  6. His last PSA has increased up to 50  7. I will get restaging scans including CT scan and bone scan prior to his next treatment  8. Return to clinic in mid August with scans prior  9. Patient and family agreeable        Requested/reviewed records from Mendy Gutierrez DO office and/or outside physician/EMR    (Patient's old records have been requested, reviewed and pertinent findings summarized in today's note.)    Procedures Today: N/A    Last several PSA's:  Lab Results   Component Value Date    PSA 50.19 (H) 06/23/2021    PSA 10.59 (H) 03/03/2021    PSA 2.22 11/25/2020       Last total testosterone:  No results found for: TESTOSTERONE    Urinalysis today:  No results found for this visit on 07/19/21.     Last BUN and creatinine:  Lab Results   Component Value Date    BUN 22 06/23/2021 Lab Results   Component Value Date    CREATININE 1.28 (H) 06/23/2021       Additional Lab/Culture results: none    Imaging Reviewed during this Office Visit:   Benito Covington MD independently reviewed the images and verified the radiology reports from:    Nm Bone Scan Whole Body    Result Date: 8/31/2020  EXAMINATION: WHOLE BODY BONE SCAN  8/29/2020 TECHNIQUE: The patient was injected intravenously with 27.0 mCi of 99 mTc MDP and scintigraphy of the entire skeleton was performed approximately three hours later. Spot images of the spine. COMPARISON: CT abdomen and pelvis dated 08/28/2020 HISTORY: ORDERING SYSTEM PROVIDED HISTORY: lytic bony lesions-suspected prostate. TECHNOLOGIST PROVIDED HISTORY: lytic bony lesions-suspected prostate. Reason for Exam: lytic bony lesions-suspected prostate Relevant Medical/Surgical History: acute renal failure FINDINGS: There is moderate multifocal abnormal intense uptake throughout the spine, most pronounced at L1. There is abnormal uptake in the posterior pelvis involving the sacrum medial iliac bones. Small focus of posterior right 8th rib uptake. Several smaller faint foci of posterior rib uptake are noted bilaterally. Focal uptake of the shoulders is likely degenerative in nature. There is a small focus of anterior right costochondral uptake at what appears to be the 5th rib. Findings consistent with 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 Question small lesions within the ribs. Us Retroperitoneal Limited    Result Date: 8/28/2020  EXAMINATION: ULTRASOUND OF THE KIDNEYS 8/28/2020 9:25 pm COMPARISON: None. HISTORY: ORDERING SYSTEM PROVIDED HISTORY: carla TECHNOLOGIST PROVIDED HISTORY: Bilateral Renal Ultrasound carla Initial evaluation. FINDINGS: Evaluation is limited due to patient body habitus, overlying bowel gas and rib shadowing. The right kidney measures 12.2 cm in length and the left kidney measures 11.8 cm in length.  Kidneys demonstrate normal cortical echogenicity. There is moderate right-sided hydronephrosis. Questionable mild left-sided hydronephrosis. No focal renal mass is clearly demonstrated. 1. Evaluation is limited due to patient body habitus as well as bowel gas and rib shadowing. 2. There appears to be moderate right and mild left-sided hydronephrosis. 3. Otherwise, no gross sonographic abnormality seen of the kidneys. PAST MEDICAL, FAMILY AND SOCIAL HISTORY:  Past Medical History:   Diagnosis Date    AAA (abdominal aortic aneurysm) (HCC)     BPH (benign prostatic hyperplasia)     CAD (coronary artery disease)     GIB (gastrointestinal bleeding)     found to have a bleeding spot in the duodenum cauterized and on iron replacement therapy continue. hb 10.2 in 7/2018 should recheck cbc with dr Ron Bennett.  Hyperlipidemia     Skin cancer, basal cell     Stroke (cerebrum) (HCC)     Systolic heart failure (HCC)      Past Surgical History:   Procedure Laterality Date    CORONARY ARTERY BYPASS GRAFT      cabg x 4 in 2000 in [address]  12/11/2020    EYE SURGERY      PROSTATE BIOPSY  08/31/2020    PROSTATE BIOPSY N/A 8/31/2020    ** ADD ON - PROSTATE BIOPSY WITH ULTRASOUND ([address] NOTIFIED DEPT) performed by Rosa Isela Elias MD at / [facility]  12/11/2020    greenlight laser    SKIN CANCER EXCISION      TURP N/A 12/11/2020    CYSTO, GREENLIGHT XPS [facility]OSTATE [facility]      Biopsy     Family History   Problem Relation Age of Onset    Heart Disease Mother     Hypertension Mother     Heart Disease Father     Heart Disease Brother      Outpatient Medications Marked as Taking for the 7/19/21 encounter (Office Visit) with Natty Sullivan MD   Medication Sig Dispense Refill    imiquimod (ALDARA) 5 % cream APPLY TO THE FACE AT NIGHT MON TO FRI FOR 6 WEEKS VERY SPARINGLY / [address] OFF IN THE MORNING.       triamcinolone (KENALOG) 0.1 % cream APPLY TWICE DAILY TO ITCHY RED AREAS ON AFFECTED ARMS FOR UP TO TWO WEEKS AT A TIME      ferrous sulfate (IRON 325) 325 (65 Fe) MG tablet Take 1 tablet by mouth daily 60 tablet 2    bicalutamide (CASODEX) 50 MG chemo tablet TAKE 1 TABLET BY MOUTH EVERY DAY 30 tablet 1    calcium carbonate 600 MG TABS tablet Take 1 tablet by mouth 2 times daily      aspirin 81 MG EC tablet Take 81 mg by mouth daily      vitamin D3 (CHOLECALCIFEROL) 25 MCG (1000 UT) TABS tablet Take 2 tablets by mouth daily 30 tablet 5    Multiple Vitamins-Minerals (THERAPEUTIC MULTIVITAMIN-MINERALS) tablet Take 1 tablet by mouth daily      sacubitril-valsartan (ENTRESTO) 24-26 MG per tablet Take 1 tablet by mouth 2 times daily      metoprolol succinate (TOPROL XL) 25 MG extended release tablet Take 1 tablet by mouth daily Replaces carvedilol. Hold for systolic BP less than 005 or Heart rate less than 55 30 tablet 3    potassium chloride (KLOR-CON) 10 MEQ extended release tablet Take 1 tablet by mouth daily Dose decreased to 10meq      rosuvastatin (CRESTOR) 10 MG tablet Take 10 mg by mouth daily         Patient has no known allergies.   Social History     Tobacco Use   Smoking Status Former Smoker    Packs/day: 0.50    Years: 15.00    Pack years: 7.50    Types: Cigarettes    Start date: 1953   [facility] Quit date: 1966    Years since quittin.5   Smokeless Tobacco Never Used      (If patient a smoker, smoking cessation counseling offered)   Social History     Substance and Sexual Activity   Alcohol Use Yes       REVIEW OF SYSTEMS:  Constitutional: negative  Eyes: negative  Respiratory: negative  Cardiovascular: negative  Gastrointestinal: negative  Genitourinary: see HPI  Musculoskeletal: negative  Skin: negative   Neurological: negative  Hematological/Lymphatic: negative  Psychological: negative        Physical Exam:    This a 80 y.o. male  Vitals:    21 1035   BP: (!) 126/56   Pulse: 67   Resp: 16   SpO2: 99%     Body mass index is 28.04 kg/m². Constitutional: Patient in no acute distress;       Assessment and Plan        1. Prostate cancer ([address]    2. Urinary retention    3. Gross hematuria               Plan:      Gross hematuria- no new issues with this. was secondary to BPH/ prostate cancer  BPH- no retention. Voiding without catheter after greenlight  Prostate cancer- sees oncology. Worsening. Cont ADT. Some generalized body aches. Castrate resistant prostate cancer. Sees oncology with scans in near future    Follow up in six months with PVR and PSA (ordered by oncology)      Prescriptions Ordered:  No orders of the defined types were placed in this encounter. Orders Placed:  No orders of the defined types were placed in this encounter.            Tom Araujo MD

## 2021-08-09 NOTE — PROGRESS NOTES
Today's Date: 8/9/2021  Patient Name: Yokasta Rao  Patient's age: 80 y.o., 5/21/1933    DIAGNOSIS: metastatic prostate CA to the spine and ribs   Current treatment  On Lupron and Zometa October 2020  Recent PSA has increased from 10 to [address]:   Chief Complaint   Patient presents with    Prostate Cancer   Shortness of breath    INTERVAL HISTORY:    Patient is returning for follow-up visit and to discuss CT scan and bone scan results and further recommendations. His bone scan showing progression of his skeletal metastatic disease. He does have some fatigue. He denies any new bone pain or back pain. Denies any diarrhea, blood in stool. His  Last PSA has increased from 10 up to 50. During this visit patient's allergy, social, medical, surgical history and medications were reviewed and updated. HISTORY OF PRESENT ILLNESS:    This is a 59-year-old male who was admitted from [facility] for complaints of worsening shortness of breath and swelling in lower extremities. Patient has history of CHF and been following with cardiologist.  Patient was also noted to have acute kidney injury on top of chronic kidney disease and being managed for acute exacerbation of systolic heart failure. Patient was noted to have high PSA of 97.6 and also sclerotic lesion in L1 vertebral body and right iliac bone suspicious for metastatic disease. Oncology consult for further opinion. Past Medical History:   has a past medical history of AAA (abdominal aortic aneurysm) ([address] BPH (benign prostatic hyperplasia), CAD (coronary artery disease), GIB (gastrointestinal bleeding), Hyperlipidemia, Skin cancer, basal cell, Stroke (cerebrum) ([address] and Systolic heart failure ([address] Past Surgical History:   has a past surgical history that includes Coronary artery bypass graft; Vocal Cord Surgery; Skin cancer excision; Eye surgery; Prostate biopsy (08/31/2020); Prostate biopsy (N/A, 8/31/2020);  Cystocopy includes Heart Disease in his brother, father, and mother; Hypertension in his mother. REVIEW OF SYSTEMS:    Constitutional: No fever or chills. No night sweats, no weight loss   Eyes: No eye discharge, double vision, or eye pain   HEENT: negative for sore mouth, sore throat, hoarseness and voice change   Respiratory: negative for cough , sputum, dyspnea, wheezing, hemoptysis, chest pain   Cardiovascular: negative for chest pain, dyspnea, palpitations, orthopnea, PND   Gastrointestinal: negative for nausea, vomiting, diarrhea, constipation, abdominal pain, Dysphagia, hematemesis and hematochezia   Genitourinary: negative for frequency, dysuria, nocturia, urinary incontinence, and hematuria   Integument: negative for rash, skin lesions, bruises.    Hematologic/Lymphatic: negative for easy bruising, bleeding, lymphadenopathy, or petechiae   Endocrine: negative for heat or cold intolerance,weight changes, change in bowel habits and hair loss   Musculoskeletal: negative for myalgias, arthralgias, pain, joint swelling,and bone pain   Neurological: negative for headaches, dizziness, seizures, weakness, numbness    PHYSICAL EXAM:      /62 (Site: Right Upper Arm, Position: Sitting, Cuff Size: Medium Adult)   Pulse 110   Temp 97.2 °F (36.2 °C)   Ht 5' 7\" (1.702 m)   Wt 173 lb 9.6 oz (78.7 kg)   SpO2 97%   BMI 27.19 kg/m²    Temp (24hrs), Av.5 °F (36.9 °C), Min:98.1 °F (36.7 °C), Max:98.8 °F (37.1 °C)    General appearance - well appearing, no in pain or distress   Mental status - alert and cooperative   Eyes - pupils equal and reactive, extraocular eye movements intact   Ears - bilateral TM's and external ear canals normal   Mouth - mucous membranes moist, pharynx normal without lesions   Neck - supple, no significant adenopathy   Lymphatics - no palpable lymphadenopathy, no hepatosplenomegaly   Chest - clear to auscultation, no wheezes, rales or rhonchi, symmetric air entry   Heart - normal rate, regular rhythm, normal S1, S2, no murmurs  Abdomen - soft, nontender, nondistended, no masses or organomegaly   Neurological - alert, oriented, normal speech, no focal findings or movement disorder noted   Musculoskeletal - no joint tenderness, deformity or swelling   Extremities - peripheral pulses normal, no pedal edema, no clubbing or cyanosis   Skin - normal coloration and turgor, no rashes, no suspicious skin lesions noted ,    DATA:    Labs:   CBC:   No results for input(s): WBC, HGB, HCT, PLT in the last 72 hours. BMP:   No results for input(s): NA, K, CO2, BUN, CREATININE, LABGLOM, GLUCOSE in the last 72 hours. PT/INR: No results for input(s): PROTIME, INR in the last 72 hours. PATHOLOGY DATA  Surgical Pathology Report   Surgical Pathology   Collected:  08/31/20 1530    Lab status:  Final    Resulting lab:  [email or web address]    Value:  -- Diagnosis --   1.  PROSTATE, LLB, NEEDLE CORE BIOPSY:             -  ADENOCARCINOMA WITH ACINAR AND DUCTAL FEATURES, RAISSA   SCORE 4+4 = 8 (WHO/ISUP GRADE GROUP 4), INVOLVING 1 OF 1 CORE, 1.9 MM   DISCONTIGUOUS LENGTH, 24% TOTAL BIOPSY VOLUME.        -  PERINEURAL INVASION PRESENT. 2.  PROSTATE, LB, NEEDLE CORE BIOPSY:             -  ADENOCARCINOMA WITH ACINAR AND DUCTAL FEATURES, RAISSA   SCORE 4+3 = 7 (WHO/ISUP GRADE GROUP 3), INVOLVING 1 OF 1 CORE, 8.6 MM   DISCONTIGUOUS LENGTH 66% OF TOTAL BIOPSY VOLUME.        -  INTRADUCTAL CARCINOMA COMPONENT PRESENT.      3.  PROSTATE, LLM, NEEDLE CORE BIOPSY:             -  ADENOCARCINOMA WITH ACINAR AND DUCTAL FEATURES, RAISSA   SCORE 4+4 = 8 (WHO/ISUP GRADE GROUP 4), INVOLVING 1 OF 1 CORE, 7.9 MM   DISCONTIGUOUS LENGTH, 56% OF TOTAL BIOPSY VOLUME.        -  INTRADUCTAL CARCINOMA COMPONENT PRESENT.        -  PERINEURAL INVASION PRESENT.         4.  PROSTATE, LM, NEEDLE CORE BIOPSY:             -  ADENOCARCINOMA, ACINAR TYPE, RAISSA SCORE 4+3 = 7   (WHO/ISUP GRADE GROUP 3), INVOLVING 1 OF 1 CORE, 11.7 MM DISCONTIGUOUS LENGTH, 78% OF TOTAL BIOPSY VOLUME.        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 5.  PROSTATE, LLA, NEEDLE CORE BIOPSY:             -  ADENOCARCINOMA WITH ACINAR AND DUCTAL FEATURES, RAISSA   SCORE 4+3 = 7 (WHO/ISUP GRADE GROUP 3), INVOLVING 1 OF 1 CORE, 1.7 MM   DISCONTIGUOUS LENGTH, 11% OF TOTAL BIOPSY VOLUME.        -  INTRADUCTAL CARCINOMA COMPONENT PRESENT. 6.  PROSTATE, LA, NEEDLE CORE BIOPSY:             -  ADENOCARCINOMA, ACINAR TYPE, RAISSA SCORE 4+3 = 7   (WHO/ISUP GRADE GROUP 3), INVOLVING 1 OF 1 CORE, 6.3 MM DISCONTIGUOUS   LENGTH, 42% OF TOTAL BIOPSY VOLUME. 7.  PROSTATE, RB, NEEDLE CORE BIOPSY:             -  ADENOCARCINOMA WITH ACINAR AND DUCTAL FEATURES, RAISSA   SCORE 4+4 = 8 (WHO/ISUP GRADE GROUP 4), INVOLVING 1 OF 1 CORE, 8.0 MM   DISCONTIGUOUS LENGTH, 53% OF TOTAL BIOPSY VOLUME.        -  INTRADUCTAL CARCINOMA COMPONENT PRESENT.        -  PERINEURAL INVASION PRESENT. 8.  PROSTATE, RLB, NEEDLE CORE BIOPSY:             -  ADENOCARCINOMA WITH ACINAR AND DUCTAL FEATURES, RAISSA   SCORE 4+4 = 8 (WHO/ISUP GRADE GROUP 4), INVOLVING 1 OF 1 CORE, 12.0 MM   DISCONTIGUOUS LENGTH, 80% OF TOTAL BIOPSY VOLUME.        -  INTRADUCTAL CARCINOMA COMPONENT PRESENT. 9.  PROSTATE, RM, NEEDLE CORE BIOPSY:             -  ADENOCARCINOMA WITH ACINAR AND DUCTAL FEATURES, RAISSA   SCORE 4+4 = 8 (WHO/ISUP GRADE GROUP 4), INVOLVING 1 OF 1 CORE, 13.2 MM   DISCONTIGUOUS LENGTH, 83% OF TOTAL BIOPSY VOLUME.        -  INTRADUCTAL CARCINOMA COMPONENT PRESENT. 10.  PROSTATE, RLM, NEEDLE CORE BIOPSY:             -  ADENOCARCINOMA WITH DUCTAL FEATURES, RAISSA SCORE 4+4 =   8 (WHO/ISUP GRADE GROUP 4), INVOLVING 1 OF 1 CORE, 3.3 MM   DISCONTIGUOUS LENGTH, 21% TOTAL BIOPSY VOLUME.        -  INTRADUCTAL CARCINOMA COMPONENT PRESENT.        -  PERINEURAL INVASION AND EXTRAPROSTATIC EXTENSION PRESENT.      11.  PROSTATE, RA, NEEDLE CORE BIOPSY:             - [address] [place] WITH ACINAR AND DUCTAL FEATURES, RAISSA   SCORE 4+5 = 9 (WHO/ISUP GRADE GROUP 5), INVOLVING 1 OF 1 CORE, 3.5 MM   DISCONTIGUOUS LENGTH, 25% OF TOTAL BIOPSY VOLUME.        -  INTRADUCTAL CARCINOMA COMPONENT PRESENT. 12.  PROSTATE, RLA, NEEDLE CORE BIOPSY:             -  ADENOCARCINOMA WITH DUCTAL FEATURES, RAISSA SCORE 4+4 =   8 (WHO/ISUP GRADE GROUP 4), INVOLVING 1 OF 1 CORE, 2.5 MM CONTIGUOUS   LENGTH, 16% OF TOTAL BIOPSY VOLUME. IMAGING DATA:  Bone scan 8/29/2020: Impression    Findings consistent with metastatic disease within the spine and posterior    pelvis.  Question small lesions within the ribs. CT chest abdomen pelvis 8/31/2020: Impression    Chest:         1. Osteopenia with superimposed diffuse degenerative changes could obscure    subtle metastatic disease. 2. Subtle sclerotic foci in T1 and T2 vertebral bodies likely metastatic. 3. No metastatic nodules. 4. Small bilateral pleural effusions right greater than left similar to prior. Abdomen pelvis:         1. Interval catheterization of the urinary bladder.  Thickened bladder wall    with new marked perivesical stranding.  Suspect cystitis. 2. Mild bilateral hydronephrosis slightly improved from prior but bilateral    hydroureter is unchanged. 3. A 4.4 cm infrarenal abdominal aortic aneurysm. IMPRESSION:   1. Metastatic prostate cancer: Elevated PSA with bony lesions suspicious for prostate cancer  2. Obstructive uropathy with bilateral ureteral hydronephrosis  3. CHF  4. ASHLEY/CKD  5. Bone lesions  6. Anemia: Status post IV iron infusion in the hospital now currently maintained on oral iron therapy. 7. Mild thrombocytopenia    RECOMMENDATIONS:  1. I reviewed the laboratory data, imaging studies, diagnosis, prognosis and treatment options with patient  2. I reviewed results of recent bone scan and CT scan which showed progression   3.  PSA has increased from 10.5-50 despite being on androgen depression therapy  4. I discussed option of starting him on Xtandi and no prescription was given  5. I reviewed the risk benefits and side effects with patient and is agreeable. 6. Continue Lupron and zometa  7. I will change his Zometa to every 6 weeks  8. His last PSA has increased up to 50  9. RTC with his next lupron  10. Return to clinic in mid August with scans prior  11. Patient and family agreeable    Mykel Wu MD  Hematologist/Medical Oncologist    This note is created with the assistance of a speech recognition program.  While intending to generate a document that actually reflects the content of the visit, the document can still have some errors including those of syntax and sound a like substitutions which may escape proof reading. It such instances, actual meaning can be extrapolated by contextual diversion.

## 2021-08-30 NOTE — TELEPHONE ENCOUNTER
Writer ari'd call from patient's family member in re: wanting 'sooner appointment for patient, he's feeling yucky and almost pouty and I hate to say that but I feel like something else must be going on. \" Informed family member that patient's next appointment on the 13th is the first that Dr. Tanisha Lombardi will be back in the office - to contact PCP or go to [facility] or ER for acute needs - family member voiced understanding, will contact office with further needs.

## 2021-09-13 NOTE — PROGRESS NOTES
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 He started on Xtandi on 9/2/2021 and tolerating well. He denies any abdominal pain nausea matting. He does have some dyspnea on exertion and he had a chest x-ray with cardiologist on 9/10 which showed possible patch of pneumonia. He denies any fever but does have some increased sputum production. He does have some chills. During this visit patient's allergy, social, medical, surgical history and medications were reviewed and updated. HISTORY OF PRESENT ILLNESS:    This is a 80-year-old male who was admitted from [facility] for complaints of worsening shortness of breath and swelling in lower extremities. Patient has history of CHF and been following with cardiologist.  Patient was also noted to have acute kidney injury on top of chronic kidney disease and being managed for acute exacerbation of systolic heart failure. Patient was noted to have high PSA of 97.6 and also sclerotic lesion in L1 vertebral body and right iliac bone suspicious for metastatic disease. Oncology consult for further opinion. Past Medical History:   has a past medical history of AAA (abdominal aortic aneurysm) ([address] BPH (benign prostatic hyperplasia), CAD (coronary artery disease), GIB (gastrointestinal bleeding), Hyperlipidemia, Skin cancer, basal cell, Stroke (cerebrum) ([address] and Systolic heart failure ([address]     Past Surgical History:   has a past surgical history that includes Coronary artery bypass graft; Vocal Cord Surgery; Skin cancer excision; Eye surgery; Prostate biopsy (08/31/2020); Prostate biopsy (N/A, 8/31/2020); Cystocopy (12/11/2020); Prostate surgery (12/11/2020); and TURP (N/A, 12/11/2020). Medications:    Current Outpatient Medications   Medication Sig Dispense Refill    Enzalutamide (XTANDI) 40 MG TABS Take 160 mg by mouth daily 120 tablet 1    ferrous sulfate (IRON 325) 325 (65 Fe) MG tablet Take 1 tablet by mouth daily 60 tablet 2    imiquimod (ALDARA) 5 % cream APPLY TO THE FACE AT NIGHT MON TO FRI FOR 6 WEEKS VERY SPARINGLY / [address] OFF IN THE MORNING.  triamcinolone (KENALOG) 0.1 % cream APPLY TWICE DAILY TO ITCHY RED AREAS ON AFFECTED ARMS FOR UP TO TWO WEEKS AT A TIME      calcium carbonate 600 MG TABS tablet Take 1 tablet by mouth 2 times daily With vitamin D3 600mg      aspirin 81 MG EC tablet Take 81 mg by mouth daily      Multiple Vitamins-Minerals (THERAPEUTIC MULTIVITAMIN-MINERALS) tablet Take 1 tablet by mouth daily      sacubitril-valsartan (ENTRESTO) 24-26 MG per tablet Take 1 tablet by mouth 2 times daily      metoprolol succinate (TOPROL XL) 25 MG extended release tablet Take 1 tablet by mouth daily Replaces carvedilol. Hold for systolic BP less than 602 or Heart rate less than 55 30 tablet 3    potassium chloride (KLOR-CON) 10 MEQ extended release tablet Take 1 tablet by mouth daily Dose decreased to 10meq      rosuvastatin (CRESTOR) 10 MG tablet Take 10 mg by mouth daily      bicalutamide (CASODEX) 50 MG chemo tablet TAKE 1 TABLET BY MOUTH EVERY DAY (Patient not taking: Reported on 8/9/2021) 30 tablet 1    vitamin D3 (CHOLECALCIFEROL) 25 MCG (1000 UT) TABS tablet Take 2 tablets by mouth daily (Patient not taking: Reported on 8/9/2021) 30 tablet 5     No current facility-administered medications for this visit. Allergies:  Patient has no known allergies. Social History:   reports that he quit smoking about 55 years ago. His smoking use included cigarettes.  He started smoking significant adenopathy   Lymphatics - no palpable lymphadenopathy, no hepatosplenomegaly   Chest - clear to auscultation, no wheezes, rales or rhonchi, symmetric air entry   Heart - normal rate, regular rhythm, normal S1, S2, no murmurs  Abdomen - soft, nontender, nondistended, no masses or organomegaly   Neurological - alert, oriented, normal speech, no focal findings or movement disorder noted   Musculoskeletal - no joint tenderness, deformity or swelling   Extremities - peripheral pulses normal, no pedal edema, no clubbing or cyanosis   Skin - normal coloration and turgor, no rashes, no suspicious skin lesions noted ,    DATA:    Labs:   CBC:   No results for input(s): WBC, HGB, HCT, PLT in the last 72 hours. BMP:   No results for input(s): NA, K, CO2, BUN, CREATININE, LABGLOM, GLUCOSE in the last 72 hours. PT/INR: No results for input(s): PROTIME, INR in the last 72 hours. PATHOLOGY DATA  Surgical Pathology Report   Surgical Pathology   Collected:  08/31/20 1530    Lab status:  Final    Resulting lab:  [facility]    Value:  -- Diagnosis --   1.  PROSTATE, LLB, NEEDLE CORE BIOPSY:             -  ADENOCARCINOMA WITH ACINAR AND DUCTAL FEATURES, RAISSA   SCORE 4+4 = 8 (WHO/ISUP GRADE GROUP 4), INVOLVING 1 OF 1 CORE, 1.9 MM   DISCONTIGUOUS LENGTH, 24% TOTAL BIOPSY VOLUME.        -  PERINEURAL INVASION PRESENT. 2.  PROSTATE, LB, NEEDLE CORE BIOPSY:             -  ADENOCARCINOMA WITH ACINAR AND DUCTAL FEATURES, RAISSA   SCORE 4+3 = 7 (WHO/ISUP GRADE GROUP 3), INVOLVING 1 OF 1 CORE, 8.6 MM   DISCONTIGUOUS LENGTH 66% OF TOTAL BIOPSY VOLUME.        -  INTRADUCTAL CARCINOMA COMPONENT PRESENT.      3.  PROSTATE, LLM, NEEDLE CORE BIOPSY:             -  ADENOCARCINOMA WITH ACINAR AND DUCTAL FEATURES, RAISSA   SCORE 4+4 = 8 (WHO/ISUP GRADE GROUP 4), INVOLVING 1 OF 1 CORE, 7.9 MM   DISCONTIGUOUS LENGTH, 56% OF TOTAL BIOPSY VOLUME.        -  INTRADUCTAL CARCINOMA COMPONENT PRESENT.        -  PERINEURAL INVASION PRESENT.         4.  PROSTATE, LM, NEEDLE CORE BIOPSY:             -  ADENOCARCINOMA, ACINAR TYPE, RAISSA SCORE 4+3 = 7   (WHO/ISUP GRADE GROUP 3), INVOLVING 1 OF 1 CORE, 11.7 MM DISCONTIGUOUS   LENGTH, 78% OF TOTAL BIOPSY VOLUME.        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 5.  PROSTATE, LLA, NEEDLE CORE BIOPSY:             -  ADENOCARCINOMA WITH ACINAR AND DUCTAL FEATURES, RAISSA   SCORE 4+3 = 7 (WHO/ISUP GRADE GROUP 3), INVOLVING 1 OF 1 CORE, 1.7 MM   DISCONTIGUOUS LENGTH, 11% OF TOTAL BIOPSY VOLUME.        -  INTRADUCTAL CARCINOMA COMPONENT PRESENT. 6.  PROSTATE, LA, NEEDLE CORE BIOPSY:             -  ADENOCARCINOMA, ACINAR TYPE, RAISSA SCORE 4+3 = 7   (WHO/ISUP GRADE GROUP 3), INVOLVING 1 OF 1 CORE, 6.3 MM DISCONTIGUOUS   LENGTH, 42% OF TOTAL BIOPSY VOLUME. 7.  PROSTATE, RB, NEEDLE CORE BIOPSY:             -  ADENOCARCINOMA WITH ACINAR AND DUCTAL FEATURES, RAISSA   SCORE 4+4 = 8 (WHO/ISUP GRADE GROUP 4), INVOLVING 1 OF 1 CORE, 8.0 MM   DISCONTIGUOUS LENGTH, 53% OF TOTAL BIOPSY VOLUME.        -  INTRADUCTAL CARCINOMA COMPONENT PRESENT.        -  PERINEURAL INVASION PRESENT. 8.  PROSTATE, RLB, NEEDLE CORE BIOPSY:             -  ADENOCARCINOMA WITH ACINAR AND DUCTAL FEATURES, RAISSA   SCORE 4+4 = 8 (WHO/ISUP GRADE GROUP 4), INVOLVING 1 OF 1 CORE, 12.0 MM   DISCONTIGUOUS LENGTH, 80% OF TOTAL BIOPSY VOLUME.        -  INTRADUCTAL CARCINOMA COMPONENT PRESENT. 9.  PROSTATE, RM, NEEDLE CORE BIOPSY:             -  ADENOCARCINOMA WITH ACINAR AND DUCTAL FEATURES, RAISSA   SCORE 4+4 = 8 (WHO/ISUP GRADE GROUP 4), INVOLVING 1 OF 1 CORE, 13.2 MM   DISCONTIGUOUS LENGTH, 83% OF TOTAL BIOPSY VOLUME.        -  INTRADUCTAL CARCINOMA COMPONENT PRESENT. 10.  PROSTATE, RLM, NEEDLE CORE BIOPSY:             -  ADENOCARCINOMA WITH DUCTAL FEATURES, RAISSA SCORE 4+4 =   8 (WHO/ISUP GRADE GROUP 4), INVOLVING 1 OF 1 CORE, 3.3 MM   DISCONTIGUOUS LENGTH, 21% TOTAL BIOPSY VOLUME.      -  INTRADUCTAL CARCINOMA COMPONENT PRESENT.        -  PERINEURAL INVASION AND EXTRAPROSTATIC EXTENSION PRESENT. 11.  PROSTATE, RA, NEEDLE CORE BIOPSY:             -  ADENOCARCINOMA WITH ACINAR AND DUCTAL FEATURES, RAISSA   SCORE 4+5 = 9 (WHO/ISUP GRADE GROUP 5), INVOLVING 1 OF 1 CORE, 3.5 MM   DISCONTIGUOUS LENGTH, 25% OF TOTAL BIOPSY VOLUME.        -  INTRADUCTAL CARCINOMA COMPONENT PRESENT. 12.  PROSTATE, RLA, NEEDLE CORE BIOPSY:             -  ADENOCARCINOMA WITH DUCTAL FEATURES, RAISSA SCORE 4+4 =   8 (WHO/ISUP GRADE GROUP 4), INVOLVING 1 OF 1 CORE, 2.5 MM CONTIGUOUS   LENGTH, 16% OF TOTAL BIOPSY VOLUME. IMAGING DATA:  Bone scan 8/29/2020: Impression    Findings consistent with metastatic disease within the spine and posterior    pelvis.  Question small lesions within the ribs. CT chest abdomen pelvis 8/31/2020: Impression    Chest:         1. Osteopenia with superimposed diffuse degenerative changes could obscure    subtle metastatic disease. 2. Subtle sclerotic foci in T1 and T2 vertebral bodies likely metastatic. 3. No metastatic nodules. 4. Small bilateral pleural effusions right greater than left similar to prior. Abdomen pelvis:         1. Interval catheterization of the urinary bladder.  Thickened bladder wall    with new marked perivesical stranding.  Suspect cystitis. 2. Mild bilateral hydronephrosis slightly improved from prior but bilateral    hydroureter is unchanged. 3. A 4.4 cm infrarenal abdominal aortic aneurysm. IMPRESSION:   1. Metastatic prostate cancer: Elevated PSA with bony lesions suspicious for prostate cancer  2. Obstructive uropathy with bilateral ureteral hydronephrosis  3. CHF  4. ASHLEY/CKD  5. Bone lesions  6. Anemia: Status post IV iron infusion in the hospital now currently maintained on oral iron therapy.   7. Mild thrombocytopenia    RECOMMENDATIONS:  1. I reviewed the laboratory data, imaging studies, diagnosis, prognosis and treatment options with patient  2. I reviewed results of recent bone scan and CT scan which showed progression   3. I will give him prescription for antibiotic for possible pneumonia  4. PSA has increased from 10.5-50 despite being on androgen depression therapy  5. Therefore he was started on Xtandi and so far tolerating well  6. Continue Lupron and zometa  7. I will change his Zometa to every 6 weeks  8. His last PSA has increased up to 50 in June 9. He will get his lab work today  8. Return to clinic in 4-6 weeks with labs prior    Mykel Lombardi MD  Hematologist/Medical Oncologist    This note is created with the assistance of a speech recognition program.  While intending to generate a document that actually reflects the content of the visit, the document can still have some errors including those of syntax and sound a like substitutions which may escape proof reading. It such instances, actual meaning can be extrapolated by contextual diversion.

## 2021-09-16 NOTE — PROGRESS NOTES
Patient tolerated zometa infusion and lupron injection without any difficulty. IV d/c'd, coban to site. Patient denies any complaints and left unit in stable condition.

## 2021-09-16 NOTE — PROGRESS NOTES
Patient on unit for zometa infusion and lupron injection. Patients labs were drawn on 9/13/21. Calcium 8.5, creatinine 1.25.   notified on lab results and orders received to proceed with zometa. IV accessed, NSS infusing. Patient c/o SOB, he has seen  and his cardiologist this week regarding the SOB.

## 2021-10-11 PROBLEM — D63.1 ANEMIA IN STAGE 3B CHRONIC KIDNEY DISEASE (HCC): Status: ACTIVE | Noted: 2021-01-01

## 2021-10-11 PROBLEM — N18.32 ANEMIA IN STAGE 3B CHRONIC KIDNEY DISEASE (HCC): Status: ACTIVE | Noted: 2021-01-01

## 2021-10-11 NOTE — PATIENT INSTRUCTIONS
Plan aranesp every 2 weeks  New script for xatadi with reduced dose to 120 mg  RTC 6-8 weeks with labs prior  Referral to Dr Kody Sullivan for dysphagia

## 2021-10-11 NOTE — PROGRESS NOTES
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 He and his family report that over past few weeks his functional status has declined significantly. He gets nausea every day and has significant tiredness. He also has difficulty swallowing at times and bringing out a lot of phlegm. He is following with his cardiologist for his shortness of breath and CHF and has given Lasix with improvement in his dyspnea on exertion. Recent lab work on 10/7/2021 showed WBC 2.5, hemoglobin 8.1  platelets 904, his PSA improved to 22.7    During this visit patient's allergy, social, medical, surgical history and medications were reviewed and updated. HISTORY OF PRESENT ILLNESS:    This is a 49-year-old male who was admitted from [facility] for complaints of worsening shortness of breath and swelling in lower extremities. Patient has history of CHF and been following with cardiologist.  Patient was also noted to have acute kidney injury on top of chronic kidney disease and being managed for acute exacerbation of systolic heart failure. Patient was noted to have high PSA of 97.6 and also sclerotic lesion in L1 vertebral body and right iliac bone suspicious for metastatic disease. Oncology consult for further opinion.     Past Medical History:   has a past medical history of AAA (abdominal aortic aneurysm) ([facility] [address] BPH (benign prostatic hyperplasia), CAD (coronary artery disease), GIB (gastrointestinal bleeding), Hyperlipidemia, Skin cancer, basal cell, Stroke (cerebrum) ([facility] [address] and Systolic heart failure ([facility] [address] Past Surgical History:   has a past surgical history that includes Coronary artery bypass graft; Vocal Cord Surgery; Skin cancer excision; Eye surgery; Prostate biopsy (08/31/2020); Prostate biopsy (N/A, 8/31/2020); Cystocopy (12/11/2020); Prostate surgery (12/11/2020); and TURP (N/A, 12/11/2020). Medications:    Current Outpatient Medications   Medication Sig Dispense Refill    ferrous sulfate (IRON 325) 325 (65 Fe) MG tablet Take 1 tablet by mouth daily 60 tablet 2    imiquimod (ALDARA) 5 % cream APPLY TO THE FACE AT NIGHT MON TO FRI FOR 6 WEEKS VERY SPARINGLY / [facility] OFF IN THE MORNING.  triamcinolone (KENALOG) 0.1 % cream APPLY TWICE DAILY TO ITCHY RED AREAS ON AFFECTED ARMS FOR UP TO TWO WEEKS AT A TIME      calcium carbonate 600 MG TABS tablet Take 1 tablet by mouth 2 times daily With vitamin D3 600mg      aspirin 81 MG EC tablet Take 81 mg by mouth daily      Multiple Vitamins-Minerals (THERAPEUTIC MULTIVITAMIN-MINERALS) tablet Take 1 tablet by mouth daily      sacubitril-valsartan (ENTRESTO) 24-26 MG per tablet Take 1 tablet by mouth 2 times daily      metoprolol succinate (TOPROL XL) 25 MG extended release tablet Take 1 tablet by mouth daily Replaces carvedilol.  Hold for systolic BP less than 645 or Heart rate less than 55 30 tablet 3    potassium chloride (KLOR-CON) 10 MEQ extended release tablet Take 1 tablet by mouth daily Dose decreased to 10meq      rosuvastatin (CRESTOR) 10 MG tablet Take 10 mg by mouth daily      bicalutamide (CASODEX) 50 MG chemo tablet TAKE 1 TABLET BY MOUTH EVERY DAY (Patient not taking: Reported on 8/9/2021) 30 tablet 1    vitamin D3 (CHOLECALCIFEROL) 25 MCG (1000 UT) TABS tablet Take 2 tablets by mouth daily (Patient not taking: Reported on 8/9/2021) 30 tablet 5     No current facility-administered medications for this visit. Allergies:  Patient has no known allergies. Social History:   reports that he quit smoking about 55 years ago. His smoking use included cigarettes. He started smoking about 68 years ago. He has a 7.50 pack-year smoking history. He has never used smokeless tobacco. He reports current alcohol use. He reports that he does not use drugs. Family History: family history includes Heart Disease in his brother, father, and mother; Hypertension in his mother. REVIEW OF SYSTEMS:    Constitutional: No fever or chills. No night sweats, no weight loss   Eyes: No eye discharge, double vision, or eye pain   HEENT: negative for sore mouth, sore throat, hoarseness and voice change   Respiratory: negative for cough , sputum, dyspnea, wheezing, hemoptysis, chest pain   Cardiovascular: negative for chest pain, dyspnea, palpitations, orthopnea, PND   Gastrointestinal: negative for nausea, vomiting, diarrhea, constipation, abdominal pain, Dysphagia, hematemesis and hematochezia   Genitourinary: negative for frequency, dysuria, nocturia, urinary incontinence, and hematuria   Integument: negative for rash, skin lesions, bruises.    Hematologic/Lymphatic: negative for easy bruising, bleeding, lymphadenopathy, or petechiae   Endocrine: negative for heat or cold intolerance,weight changes, change in bowel habits and hair loss   Musculoskeletal: negative for myalgias, arthralgias, pain, joint swelling,and bone pain   Neurological: negative for headaches, dizziness, seizures, weakness, numbness    PHYSICAL EXAM:      /64 (Site: Right Upper Arm, Position: Sitting, Cuff Size: Medium Adult)   Pulse 108   Temp 97.7 °F (36.5 °C)   Ht 5' 7\" (1.702 m)   Wt 166 lb (75.3 kg)   SpO2 98%   BMI 26.00 kg/m²    Temp (24hrs), Av.5 °F (36.9 °C), Min:98.1 °F (36.7 °C), Max:98.8 °F (37.1 °C)    General appearance - well appearing, no in pain or distress   Mental status - alert and cooperative   Eyes - pupils equal and reactive, extraocular eye movements intact   Ears - bilateral TM's and external ear canals normal   Mouth - mucous membranes moist, pharynx normal without lesions   Neck - supple, no significant adenopathy   Lymphatics - no palpable lymphadenopathy, no hepatosplenomegaly   Chest - clear to auscultation, no wheezes, rales or rhonchi, symmetric air entry   Heart - normal rate, regular rhythm, normal S1, S2, no murmurs  Abdomen - soft, nontender, nondistended, no masses or organomegaly   Neurological - alert, oriented, normal speech, no focal findings or movement disorder noted   Musculoskeletal - no joint tenderness, deformity or swelling   Extremities - peripheral pulses normal, no pedal edema, no clubbing or cyanosis   Skin - normal coloration and turgor, no rashes, no suspicious skin lesions noted ,    DATA:    Labs:   CBC:   No results for input(s): WBC, HGB, HCT, PLT in the last 72 hours. BMP:   No results for input(s): NA, K, CO2, BUN, CREATININE, LABGLOM, GLUCOSE in the last 72 hours. PT/INR: No results for input(s): PROTIME, INR in the last 72 hours. PATHOLOGY DATA  Surgical Pathology Report   Surgical Pathology   Collected:  08/31/20 1530    Lab status:  Final    Resulting lab:  [facility]    Value:  -- Diagnosis --   1.  PROSTATE, LLB, NEEDLE CORE BIOPSY:             -  ADENOCARCINOMA WITH ACINAR AND DUCTAL FEATURES, RAISSA   SCORE 4+4 = 8 (WHO/ISUP GRADE GROUP 4), INVOLVING 1 OF 1 CORE, 1.9 MM   DISCONTIGUOUS LENGTH, 24% TOTAL BIOPSY VOLUME.        -  PERINEURAL INVASION PRESENT. 2.  PROSTATE, LB, NEEDLE CORE BIOPSY:             -  ADENOCARCINOMA WITH ACINAR AND DUCTAL FEATURES, RAISSA   SCORE 4+3 = 7 (WHO/ISUP GRADE GROUP 3), INVOLVING 1 OF 1 CORE, 8.6 MM   DISCONTIGUOUS LENGTH 66% OF TOTAL BIOPSY VOLUME.        -  INTRADUCTAL CARCINOMA COMPONENT PRESENT.      3.  PROSTATE, LLM, NEEDLE CORE BIOPSY:             - [address] [facility] AND DUCTAL FEATURES, RAISSA   SCORE 4+4 = 8 (WHO/ISUP GRADE GROUP 4), INVOLVING 1 OF 1 CORE, 7.9 MM   DISCONTIGUOUS LENGTH, 56% OF TOTAL BIOPSY VOLUME.        -  INTRADUCTAL CARCINOMA COMPONENT PRESENT.        -  PERINEURAL INVASION PRESENT.         4.  PROSTATE, LM, NEEDLE CORE BIOPSY:             -  ADENOCARCINOMA, ACINAR TYPE, RAISSA SCORE 4+3 = 7   (WHO/ISUP GRADE GROUP 3), INVOLVING 1 OF 1 CORE, 11.7 MM DISCONTIGUOUS   LENGTH, 78% OF TOTAL BIOPSY VOLUME.        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 5.  PROSTATE, LLA, NEEDLE CORE BIOPSY:             -  ADENOCARCINOMA WITH ACINAR AND DUCTAL FEATURES, RAISSA   SCORE 4+3 = 7 (WHO/ISUP GRADE GROUP 3), INVOLVING 1 OF 1 CORE, 1.7 MM   DISCONTIGUOUS LENGTH, 11% OF TOTAL BIOPSY VOLUME.        -  INTRADUCTAL CARCINOMA COMPONENT PRESENT. 6.  PROSTATE, LA, NEEDLE CORE BIOPSY:             -  ADENOCARCINOMA, ACINAR TYPE, RAISSA SCORE 4+3 = 7   (WHO/ISUP GRADE GROUP 3), INVOLVING 1 OF 1 CORE, 6.3 MM DISCONTIGUOUS   LENGTH, 42% OF TOTAL BIOPSY VOLUME. 7.  PROSTATE, RB, NEEDLE CORE BIOPSY:             -  ADENOCARCINOMA WITH ACINAR AND DUCTAL FEATURES, RAISSA   SCORE 4+4 = 8 (WHO/ISUP GRADE GROUP 4), INVOLVING 1 OF 1 CORE, 8.0 MM   DISCONTIGUOUS LENGTH, 53% OF TOTAL BIOPSY VOLUME.        -  INTRADUCTAL CARCINOMA COMPONENT PRESENT.        -  PERINEURAL INVASION PRESENT. 8.  PROSTATE, RLB, NEEDLE CORE BIOPSY:             -  ADENOCARCINOMA WITH ACINAR AND DUCTAL FEATURES, RAISSA   SCORE 4+4 = 8 (WHO/ISUP GRADE GROUP 4), INVOLVING 1 OF 1 CORE, 12.0 MM   DISCONTIGUOUS LENGTH, 80% OF TOTAL BIOPSY VOLUME.        -  INTRADUCTAL CARCINOMA COMPONENT PRESENT.      9.  PROSTATE, RM, NEEDLE CORE BIOPSY:             -  ADENOCARCINOMA WITH ACINAR AND DUCTAL FEATURES, RAISSA   SCORE 4+4 = 8 (WHO/ISUP GRADE GROUP 4), INVOLVING 1 OF 1 CORE, 13.2 MM   DISCONTIGUOUS LENGTH, 83% OF TOTAL BIOPSY VOLUME.        -  INTRADUCTAL CARCINOMA COMPONENT PRESENT. 10.  PROSTATE, RLM, NEEDLE CORE BIOPSY:             -  ADENOCARCINOMA WITH DUCTAL FEATURES, RIASSA SCORE 4+4 =   8 (WHO/ISUP GRADE GROUP 4), INVOLVING 1 OF 1 CORE, 3.3 MM   DISCONTIGUOUS LENGTH, 21% TOTAL BIOPSY VOLUME.        -  INTRADUCTAL CARCINOMA COMPONENT PRESENT.        -  PERINEURAL INVASION AND EXTRAPROSTATIC EXTENSION PRESENT. 11.  PROSTATE, RA, NEEDLE CORE BIOPSY:             -  ADENOCARCINOMA WITH ACINAR AND DUCTAL FEATURES, RAISSA   SCORE 4+5 = 9 (WHO/ISUP GRADE GROUP 5), INVOLVING 1 OF 1 CORE, 3.5 MM   DISCONTIGUOUS LENGTH, 25% OF TOTAL BIOPSY VOLUME.        -  INTRADUCTAL CARCINOMA COMPONENT PRESENT. 12.  PROSTATE, RLA, NEEDLE CORE BIOPSY:             -  ADENOCARCINOMA WITH DUCTAL FEATURES, RAISSA SCORE 4+4 =   8 (WHO/ISUP GRADE GROUP 4), INVOLVING 1 OF 1 CORE, 2.5 MM CONTIGUOUS   LENGTH, 16% OF TOTAL BIOPSY VOLUME. IMAGING DATA:  Bone scan 8/29/2020: Impression    Findings consistent with metastatic disease within the spine and posterior    pelvis.  Question small lesions within the ribs. CT chest abdomen pelvis 8/31/2020: Impression    Chest:         1. Osteopenia with superimposed diffuse degenerative changes could obscure    subtle metastatic disease. 2. Subtle sclerotic foci in T1 and T2 vertebral bodies likely metastatic. 3. No metastatic nodules. 4. Small bilateral pleural effusions right greater than left similar to prior. Abdomen pelvis:         1. Interval catheterization of the urinary bladder.  Thickened bladder wall    with new marked perivesical stranding.  Suspect cystitis. 2. Mild bilateral hydronephrosis slightly improved from prior but bilateral    hydroureter is unchanged. 3. A 4.4 cm infrarenal abdominal aortic aneurysm. IMPRESSION:   1. Metastatic prostate cancer: Elevated PSA with bony lesions suspicious for prostate cancer  2. Obstructive uropathy with bilateral ureteral hydronephrosis  3. CHF  4. ASHLEY/CKD  5.  Bone lesions  6. Anemia: Status post IV iron infusion in the hospital now currently maintained on oral iron therapy. 7. Mild thrombocytopenia    RECOMMENDATIONS:  1. I reviewed the laboratory data, imaging studies, diagnosis, prognosis and treatment options with patient  2. Currently he is on Xtandi 160 mg daily. He does report fatigue, nausea and therefore I will decrease the dose of Xtandi 220 mg  3. I will start him on Aranesp 200 mcg every 2 weeks for his anemia  4. I discussed option of holding off further cancer treatment and focus on his comfort. Patient and family are very open about it and are ready to discuss  5. I explained we will try to improve his hemoglobin and also reduce the dose of Xtandi and try to keep his cancer under control. If there is no improvement in another few weeks we will consider holding of further treatment and focus on best supportive care  6. I will send him to general surgery for evaluation of his dysphagia  7. Continue Lupron and zometa  8. I will change his Zometa to every 6 weeks  9. Return to clinic in 4-6 weeks with labs prior    Mykel Roth MD  Hematologist/Medical Oncologist    This note is created with the assistance of a speech recognition program.  While intending to generate a document that actually reflects the content of the visit, the document can still have some errors including those of syntax and sound a like substitutions which may escape proof reading. It such instances, actual meaning can be extrapolated by contextual diversion.

## 2021-10-12 NOTE — TELEPHONE ENCOUNTER
I spoke with daughter Foster Angelucci. It would be better for her and patient if we can schedule EGD over the phone due to she is working and patient does not drive. Will fax instructions to Foster Angelucci at [phone] for EGD scheduled at [facility] on 10/19/21.

## 2021-10-12 NOTE — TELEPHONE ENCOUNTER
Gayle called from Oncology, Dr Nathan Thornton would like Dr Xiomara Gruber to see this patient for dyshagia sooner than what [address] has available. Writer did offer the [facility] office as patient lives in [facility]. Patient wants to see Dr Xiomara Gruber in [place]. Are you able to see patient sooner?    Please call patient back at [phone]

## 2021-10-12 NOTE — TELEPHONE ENCOUNTER
Patient:Mike Bajwa                 FTW:5/13/9309  (no) patient has seen Dr. Sara Sky prior to procedure  PCP: Reyna Jesus  Referral of Dr. Pierre Resendiz                  EGD  Nausea: yes  Vomiting: no  Heartburn:no  Dysphagia:yes with meat and bread  Hematemesis:no  Epigastric pain:no  Anemia: no  Previous work up date:7/2018-EGD=bleeding at duodenum, done in [place]  Current Treatment:None

## 2021-10-19 NOTE — OP NOTE
EGD PROCEDURE NOTE:      Pre op diagnosis:     dysphagia, nausea       Operative Procedure:    1. EGD with cold biopsy    Surgeon:    Dr. Glasgow Newington     Anesthesia:    IV sedation per CRNA    EBL:  minimal      Procedure:    Patient was taken to the endoscopy suite and placed in a left lateral decubitus position. They were given IV sedation as mentioned above. The gastric scope was passed through the mouth piece and down the esophagus, stomach and into the second portion of the duodenum. It was withdrawn slowly and cold biopsies were taken in the antrum,body of the stomach and the distal esophagus. The scope was retroflexed in the stomach and GE junction was examined. The following findings were noted. Final Diagnosis:    1.  GE junction at 40 cm  2. Mild esophagitis, no striture in esophagus  3. Mild diffuse gastritis with small antral ulcer, bx  4.  2 large duodenal ulcers in 1 st portions with edema , but no evidence of blockage or bleeding. Plan:    1. Await bx  2. Start on prilosec and pepcid  3. If dysphagia persists may need esophagram and/or BA swallow test to check for esophageal motility issues. ADDENDUM:  Endo Review :  10/29/2021    Pathology:    -- Diagnosis --   1.  ANTRUM ULCER BIOPSY: [facility]. 2.  GASTRIC BODY BIOPSY:  MILD TO MODERATE CHRONIC INACTIVE GASTRITIS.    POSITIVE FOR HELICOBACTER PYLORI. 3.  DISTAL ESOPHAGUS BIOPSY:  NORMAL SQUAMOUS MUCOSA.  GASTRIC MUCOSA   WITH MODERATE CHRONIC INACTIVE INFLAMMATION AND INTESTINAL/GOBLET CELL   METAPLASIA (ALMANZA'S ESOPHAGUS); NEGATIVE FOR GLANDULAR DYSPLASIA. Plan:    1. Treat H pylori per protocol  2. Then contnue with prilosec 20 mg q am and pepcid 20 mg nightly  3.   Then repeat EGD in 1 year

## 2021-10-19 NOTE — ANESTHESIA PRE PROCEDURE
Department of Anesthesiology  Preprocedure Note       Name:  Bjorn Rivera   Age:  80 y.o.  :  1933                                          MRN:  [id number]         Date:  10/19/2021      Surgeon: Laron Wynn):  Mary Cantu MD    Procedure: Procedure(s):  v-EGD (call daughter-Marjorie Galvez [phone])    Medications prior to admission:   Prior to Admission medications    Medication Sig Start Date End Date Taking? Authorizing Provider   ferrous sulfate (IRON 325) 325 (65 Fe) MG tablet Take 1 tablet by mouth daily 21  Yes Lauren Christian MD   calcium carbonate 600 MG TABS tablet Take 1 tablet by mouth 2 times daily With vitamin D3 600mg   Yes Historical Provider, MD   Multiple Vitamins-Minerals (THERAPEUTIC MULTIVITAMIN-MINERALS) tablet Take 1 tablet by mouth daily   Yes Historical Provider, MD   sacubitril-valsartan (ENTRESTO) 24-26 MG per tablet Take 1 tablet by mouth 2 times daily 10/16/20  Yes Historical Provider, MD   metoprolol succinate (TOPROL XL) 25 MG extended release tablet Take 1 tablet by mouth daily Replaces carvedilol.  Hold for systolic BP less than 660 or Heart rate less than 55 20  Yes Yasemin Nguyen MD   potassium chloride (KLOR-CON) 10 MEQ extended release tablet Take 1 tablet by mouth daily Dose decreased to 10meq 20  Yes Yasemin Nguyen MD   rosuvastatin (CRESTOR) 10 MG tablet Take 10 mg by mouth daily   Yes Historical Provider, MD   furosemide (LASIX) 40 MG tablet Take 1 tablet by mouth daily 10/14/21   Historical Provider, MD   Enzalutamide (XTANDI) 40 MG TABS Take 120 mg by mouth daily 10/11/21 11/10/21  Lauren Christian MD   imiquimod (ALDARA) 5 % cream APPLY TO THE FACE AT NIGHT MON TO FRI FOR 6 WEEKS VERY SPARINGLY / [address] OFF IN THE MORNING. 21   Historical Provider, MD   triamcinolone (KENALOG) 0.1 % cream APPLY TWICE DAILY TO ITCHY RED AREAS ON AFFECTED ARMS FOR UP TO TWO WEEKS AT A TIME 12/15/20   Historical Provider, MD   aspirin 81 MG EC tablet Take 81 mg by mouth daily    Historical Provider, MD       Current medications:    Current Facility-Administered Medications   Medication Dose Route Frequency Provider Last Rate Last Admin    lactated ringers infusion   IntraVENous Continuous Sharda Ross  mL/hr at 10/19/21 0834 New Bag at 10/19/21 0834       Allergies:  No Known Allergies    Problem List:    Patient Active Problem List   Diagnosis Code    Hydroureteronephrosis N13.30    Hyperkalemia E87.5    Coronary artery disease involving coronary bypass graft of native heart without angina pectoris I25.810    Benign prostatic hyperplasia with urinary obstruction N40.1, N13.8    Abdominal aortic aneurysm (AAA) without rupture (HCC) I71.4    Coronary arteriosclerosis in native artery I25.10    Hypercholesterolemia E78.00    Mixed hyperlipidemia E78.2    Upper gastrointestinal bleeding K92.2    Chronic systolic heart failure (HCC) I50.22    Benign hypertension with chronic kidney disease, stage II I12.9, N18.2    LBBB (left bundle branch block) I44.7    Gross hematuria R31.0    CKD (chronic kidney disease) stage 3, GFR 30-59 ml/min (HCC) N18.30    Urinary obstruction N13.9    Prostate cancer, primary, with metastasis from prostate to other site [facility]) C61    ASHLEY (acute kidney injury) ([facility] [address] N17.9    Elevated PSA R97.20    Anemia in stage 3b chronic kidney disease (HCC) N18.32, D63.1       Past Medical History:        Diagnosis Date    AAA (abdominal aortic aneurysm) ([facility] [address]     BPH (benign prostatic hyperplasia)     CAD (coronary artery disease)     GIB (gastrointestinal bleeding)     found to have a bleeding spot in the duodenum cauterized and on iron replacement therapy continue. hb 10.2 in 7/2018 should recheck cbc with dr Miguel A Rao.      Hyperlipidemia     Skin cancer, basal cell     Stroke (cerebrum) (HCC)     Systolic heart failure (HCC)        Past Surgical History:        Procedure Laterality Date    CORONARY ARTERY BYPASS GRAFT      cabg x 4 in 2000 in [address]  2020    EYE SURGERY      PROSTATE BIOPSY  2020    PROSTATE BIOPSY N/A 2020    ** ADD ON - PROSTATE BIOPSY WITH ULTRASOUND ([address] NOTIFIED DEPT) performed by Ramone Mackenzie MD [facility]  2020    greenlight laser    SKIN CANCER EXCISION      TURP N/A 2020    CYSTO, GREENLIGHT XPS PROSTATE performed by Kim Krause MD at [facility]      Biopsy       Social History:    Social History     Tobacco Use    Smoking status: Former Smoker     Packs/day: 0.50     Years: 15.00     Pack years: 7.50     Types: Cigarettes     Start date: 1953     Quit date: 1966     Years since quittin.8    Smokeless tobacco: Never Used   Substance Use Topics    Alcohol use: Yes                                Counseling given: Not Answered      Vital Signs (Current):   Vitals:    10/19/21 0817   BP: (!) 114/56   Pulse: (!) 2   Resp: 16   Temp: 36.5 °C (97.7 °F)   TempSrc: Oral   SpO2: 100%   Weight: 158 lb (71.7 kg)   Height: 5' 7\" (1.702 m)                                              BP Readings from Last 3 Encounters:   10/19/21 (!) 114/56   10/11/21 108/64   21 (!) 123/54       NPO Status: Time of last liquid consumption:                         Time of last solid consumption:                         Date of last liquid consumption: 10/18/21                        Date of last solid food consumption: 10/18/21    BMI:   Wt Readings from Last 3 Encounters:   10/19/21 158 lb (71.7 kg)   10/11/21 166 lb (75.3 kg)   21 166 lb 12.8 oz (75.7 kg)     Body mass index is 24.75 kg/m².     CBC:   Lab Results   Component Value Date    WBC 3.5 2021    RBC 2.87 2021    HGB 8.4 2021    HCT 27.7 2021    MCV 96.5 2021    RDW 19.3 2021     2021       CMP:   Lab Results   Component Value Date     2021    K 4.9 2021     2021    CO2 19 2021 BUN 27 09/13/2021    CREATININE 1.25 09/13/2021    GFRAA >60 09/13/2021    LABGLOM 55 09/13/2021    GLUCOSE 107 09/13/2021    PROT 7.2 06/23/2021    CALCIUM 8.5 09/13/2021    BILITOT 0.48 06/23/2021    ALKPHOS 1,019 06/23/2021    AST 36 06/23/2021    ALT 24 06/23/2021       POC Tests: No results for input(s): POCGLU, POCNA, POCK, POCCL, POCBUN, POCHEMO, POCHCT in the last 72 hours. Coags: No results found for: PROTIME, INR, APTT    HCG (If Applicable): No results found for: PREGTESTUR, PREGSERUM, HCG, HCGQUANT     ABGs: No results found for: PHART, PO2ART, ZFW5HBT, IOF9HJL, BEART, Z4EADPDE     Type & Screen (If Applicable):  No results found for: LABABO, LABRH    Drug/Infectious Status (If Applicable):  Lab Results   Component Value Date    HEPCAB NONREACTIVE 08/30/2020       COVID-19 Screening (If Applicable):   Lab Results   Component Value Date    COVID19 Detected 11/09/2020           Anesthesia Evaluation  Patient summary reviewed no history of anesthetic complications:   Airway: Mallampati: II  TM distance: >3 FB   Neck ROM: full  Mouth opening: > = 3 FB Dental: normal exam         Pulmonary:normal exam                               Cardiovascular:    (+) hypertension:, CAD: no interval change, CHF: systolic,       ECG reviewed                        Neuro/Psych:   (+) CVA: no interval change,             GI/Hepatic/Renal:   (+) renal disease: CRI,           Endo/Other:    (+) malignancy/cancer. ROS comment: Metastatic prostate cancer Abdominal:             Vascular:   + PVD, aortic or cerebral, . Other Findings:             Anesthesia Plan      TIVA and MAC     ASA 3       Induction: intravenous. Anesthetic plan and risks discussed with patient, spouse and child/children.       Plan discussed with surgical team.                  BAM Penaloza - CRNA   10/19/2021

## 2021-10-19 NOTE — H&P
Slava Haque is a 80 y.o. male          CC:    dysphagia    HISTORY OF PRESENT ILLNESS:    Pt is 81 yo male referred from oncology with increasing dysphagia. Has nausea also,  No heartburn  On no treatment at this time  Last EGD 2018, bleeding at duodenum and done in [place]          Review of Systems:    General:  Fever: Negative  Weight Change:Negative  Night Sweats: Negative    Eye:  Blurry Vision:Negative  Double Vision: Negative    Ent:  Headaches: Negative  Sore throat: Negative    Allergy/Immunology:  Hives: Negative  Latex allergy: Negative    Hematology/Lymphatic:  Bleeding Problems: Negative  Blood Clots: Negative  Swollen Lymph Nodes: Negative    Lungs:  Cough: Negative  SOB: Negative  Wheezing:Negative    Cardiovascular:  Chest Pain: Negative  Palpitations:Negative    GI:   Decreased Appetite: Negative  Heartburn: Negative  Dysphagia: yes  Nausea/Vomiting: yes  Abdominal Pain: Negative  Change in Bowels:Negative  Constipation: Negative  Diarrhea: Negative  Rectal Bleeding: Negative    :   Dysuria: Negative  Increase Urinary Frequency/Urgency: Negative    Neuro:  Seizures: Negative  Confusion: Negative        PAST MEDICAL HISTORY:        Family History   Problem Relation Age of Onset    Heart Disease Mother     Hypertension Mother     Heart Disease Father     Heart Disease Brother      Social History     Socioeconomic History    Marital status:      Spouse name: Not on file    Number of children: Not on file    Years of education: Not on file    Highest education level: Not on file   Occupational History    Not on file   Tobacco Use    Smoking status: Former Smoker     Packs/day: 0.50     Years: 15.00     Pack years: 7.50     Types: Cigarettes     Start date: 1953     Quit date: 1966     Years since quittin.8    Smokeless tobacco: Never Used   Substance and Sexual Activity    Alcohol use:  Yes    Drug use: Never    Sexual activity: Not Currently     Partners: Female Other Topics Concern    Not on file   Social History Narrative    Not on file     Social Determinants of Health     Financial Resource Strain:     Difficulty of Paying Living Expenses:    Food Insecurity:     Worried About Running Out of Food in the Last Year:     [address] in the Last Year:    Transportation Needs:     Lack of Transportation (Medical):  Lack of Transportation (Non-Medical):    Physical Activity:     Days of Exercise per Week:     Minutes of Exercise per Session:    Stress:     Feeling of Stress :    Social Connections:     Frequency of Communication with Friends and Family:     Frequency of Social Gatherings with Friends and Family:     Attends Yarsanism Services:     Active Member of Clubs or Organizations:     Attends Club or Organization Meetings:     Marital Status:    Intimate Partner Violence:     Fear of Current or Ex-Partner:     Emotionally Abused:     Physically Abused:     Sexually Abused:      Past Surgical History:   Procedure Laterality Date    CORONARY ARTERY BYPASS GRAFT      cabg x 4 in 2000 in [address]  12/11/2020    [address] BIOPSY  08/31/2020    PROSTATE BIOPSY N/A 8/31/2020    ** ADD ON - PROSTATE BIOPSY WITH ULTRASOUND ([address] NOTIFIED DEPT) performed by Balaji Fenton MD at [place] [address]  12/11/2020    greenlight laser    SKIN CANCER EXCISION      TURP N/A 12/11/2020    CYSTO, GREENLIGHT XPS PROSTATE performed by Todd Holguin MD at [facility]      Biopsy     Past Medical History:   Diagnosis Date    AAA (abdominal aortic aneurysm) ([facility] [address]     BPH (benign prostatic hyperplasia)     CAD (coronary artery disease)     GIB (gastrointestinal bleeding)     found to have a bleeding spot in the duodenum cauterized and on iron replacement therapy continue. hb 10.2 in 7/2018 should recheck cbc with dr Pierre Dear.      Hyperlipidemia     Skin cancer, basal cell     Stroke (cerebrum) (HCC)  Systolic heart failure (HCC)      No current facility-administered medications on file prior to encounter. Current Outpatient Medications on File Prior to Encounter   Medication Sig Dispense Refill    ferrous sulfate (IRON 325) 325 (65 Fe) MG tablet Take 1 tablet by mouth daily 60 tablet 2    calcium carbonate 600 MG TABS tablet Take 1 tablet by mouth 2 times daily With vitamin D3 600mg      Multiple Vitamins-Minerals (THERAPEUTIC MULTIVITAMIN-MINERALS) tablet Take 1 tablet by mouth daily      sacubitril-valsartan (ENTRESTO) 24-26 MG per tablet Take 1 tablet by mouth 2 times daily      metoprolol succinate (TOPROL XL) 25 MG extended release tablet Take 1 tablet by mouth daily Replaces carvedilol. Hold for systolic BP less than 900 or Heart rate less than 55 30 tablet 3    potassium chloride (KLOR-CON) 10 MEQ extended release tablet Take 1 tablet by mouth daily Dose decreased to 10meq      rosuvastatin (CRESTOR) 10 MG tablet Take 10 mg by mouth daily      furosemide (LASIX) 40 MG tablet Take 1 tablet by mouth daily      Enzalutamide (XTANDI) 40 MG TABS Take 120 mg by mouth daily 90 tablet 3    imiquimod (ALDARA) 5 % cream APPLY TO THE FACE AT NIGHT MON TO FRI FOR 6 WEEKS VERY SPARINGLY / [address] OFF IN THE MORNING.  triamcinolone (KENALOG) 0.1 % cream APPLY TWICE DAILY TO ITCHY RED AREAS ON AFFECTED ARMS FOR UP TO TWO WEEKS AT A TIME      aspirin 81 MG EC tablet Take 81 mg by mouth daily       Allergies as of 10/12/2021    (No Known Allergies)     PHYSICAL EXAM:    Blood pressure (!) 114/56, pulse (!) 2, temperature 97.7 °F (36.5 °C), temperature source Oral, resp. rate 16, height 5' 7\" (1.702 m), weight 158 lb (71.7 kg), SpO2 100 %. Gen:  A and O x 3, NAD, well nourished  Eyes:  Sclera non icterus, PERRL  Lungs:  CTA, symmetrical  Chest:  RRR, no murmurs  Abd:  Soft, NT, ND, no HSM, no bruits          ASSESS MENT:    1.   Increasing Dysphagia       PLAN:    EGD

## 2021-10-19 NOTE — ANESTHESIA POSTPROCEDURE EVALUATION
Department of Anesthesiology  Postprocedure Note    Patient: Manju Clemente  MRN: [id number]  [place]: 5/21/1933  Date of evaluation: 10/19/2021  Time:  9:57 AM     Procedure Summary     Date: 10/19/21 Room / Location: [address]    Anesthesia Start: 4763 Anesthesia Stop: 4275    Procedure: EGD BIOPSY (N/A ) Diagnosis: (dysphagia, nausea)    Surgeons: Selin Baltazar MD Responsible Provider: BAM Cooley CRNA    Anesthesia Type: TIVA, MAC ASA Status: 3          Anesthesia Type: TIVA, MAC    Be Phase I: Be Score: 10    Be Phase II: Be Score: 9    Last vitals: Reviewed and per EMR flowsheets.        Anesthesia Post Evaluation    Patient location during evaluation: bedside  Patient participation: complete - patient participated  Level of consciousness: awake and alert  Pain score: 0  Airway patency: patent  Nausea & Vomiting: no nausea and no vomiting  Complications: no  Cardiovascular status: blood pressure returned to baseline and hemodynamically stable  Respiratory status: acceptable  Hydration status: euvolemic

## 2021-10-28 NOTE — PROGRESS NOTES
Patient came to unit via wheelchair with daughter and wife. Scheduled for zometa and aranesp. HGB 6.6 SOB and fatigued. calcium 7.7 creatinine 1.43  Dr. Kathia Calderon ordered 1 unit of PRBC and hold zometa. Type and cross match drawn per . Patient will return tomorrow for 1 unit of PRBC. IV discontinued and sent home with daughter.

## 2021-10-29 NOTE — PROGRESS NOTES
Patient tolerated 1 unit PRBC without any complications. Patient kept for 20 minuets observation post transfusion. Denies dizziness, lightheadedness, acute nausea or vomiting, headache, heart palpitations, rash/itching or increased SOB. Last vital signs  BP (!) 97/53   Pulse 89   Temp 97.3 °F (36.3 °C) (Axillary)   Resp 16   SpO2 98%     Patient instructed if they experience any of the above symptoms following today's visit, he/she is to notify the Physician or go to the Emergency Dept. Discharge instructions given to patient, Verbalizes understanding. Ambulated off unit per self with family members in stable condition with all belongings.

## 2021-11-02 NOTE — TELEPHONE ENCOUNTER
LM on patient's, patient's wife, and patient's daughter voicemail requesting a call back to review results from recent EGD at [facility] with Dr. Radha Lane on 10/19/2021. Updated history and recall. Forwarded results to PCP.

## 2021-11-02 NOTE — TELEPHONE ENCOUNTER
Received call from patient's daughter and reviewed results she verbalized understanding. Also received call from patient and reviewed results. Will send antibiotics for H. Pylori protocol. Patient would like antibiotics to be sent to [facility] in [place]. Order placed.

## 2021-11-04 NOTE — PROGRESS NOTES
Aranesp injection given, Zometa held due to low calcium level. IV discontinued and pt left infusion center via wheelchair with family.

## 2021-11-04 NOTE — PROGRESS NOTES
Pt ambulated to chair without difficulty. IV established and NSS infusing. No new complaints at this time.

## 2021-11-04 NOTE — PROGRESS NOTES
HGB 8.5 aranesp given Chandni Estrada served Dr. Holcomb Sites calcium 7.9 waiting for further orders on zometa.

## 2021-11-05 NOTE — TELEPHONE ENCOUNTER
Patient had his Aranesp injection yesterday. Daughter states today he is feeling week and his color is not good. No pain. She is wondering if his body is rejecting the medication and is asking if he could get labs done to make sure his numbers are not decreasing. Please advise, thanks.

## 2021-11-10 NOTE — TELEPHONE ENCOUNTER
Writer l/m for return call  Writer spoke to Dr. Sweta Millard and per his instructions pt to have another cbc to check levels.

## 2021-11-18 ENCOUNTER — HOSPITAL ENCOUNTER (OUTPATIENT)
Dept: INFUSION THERAPY | Age: 86
End: 2021-11-18

## 2024-04-30 NOTE — PROGRESS NOTES
S/p greenlight  Advanced prostate cancer  ADT/casodex and advanced prostate cancer meds per oncology.  Seeing Dr Lokesh Sharif hematuria secondary to catheter/bph/prostate cancer    Lab Results   Component Value Date    PSA 2.22 11/25/2020    PSA 97.66 (H) 08/28/2020         Plan:    Follow up with oncology   Pt wants copy of psa  Follow up in six months with PSA notify provider Provider notify, supplemental o2 notify provider Awaiting provider orders. Reassess midline insertion in AM notify provider notify provider notify provider

## (undated) DEVICE — STRAP,CATHETER,ELASTIC,HOOK&LOOP: Brand: MEDLINE

## (undated) DEVICE — DRAPE,REIN 53X77,STERILE: Brand: MEDLINE

## (undated) DEVICE — GLOVE ORANGE PI 7   MSG9070

## (undated) DEVICE — GLOVE SURG SZ 65 THK91MIL LTX FREE SYN POLYISOPRENE

## (undated) DEVICE — LASER SURG W22XH58IN D36IN 475LB SLD STATE FREQ DOUBLED

## (undated) DEVICE — CANNULA NSL AD L2IN ETCO2 SAMP SFT CRUSH RESIST FEM AIRLFE

## (undated) DEVICE — CATHETER URETH 24FR BLLN 30CC SIL ALLY W/ SIL HYDRGEL 3 W F

## (undated) DEVICE — NEEDLE BX ASPIR SPNL TIPCM MRK AND NDL STP 22GAX20CM

## (undated) DEVICE — MONOPTY® DISPOSABLE CORE BIOPSY INSTRUMENT, 22MM PENETRATION DEPTH, 18G X 20CM: Brand: MONOPTY

## (undated) DEVICE — RENTAL LASER XPS CASE

## (undated) DEVICE — SEAL ENDO INSTR SELF SEAL UROLOGY

## (undated) DEVICE — CATHETER,FOLEY,3-WAY,22FR,30ML,100%SILI: Brand: MEDLINE

## (undated) DEVICE — MERCY DEFIANCE ENDO KIT: Brand: MEDLINE INDUSTRIES, INC.

## (undated) DEVICE — 1200CC GUARDIAN II: Brand: GUARDIAN

## (undated) DEVICE — FORCEPS BX L240CM JAW DIA2.4MM ORNG L CAP W/ NDL DISP RAD

## (undated) DEVICE — CONNECTOR TBNG AUX H2O JET DISP FOR OLY 160/180 SER

## (undated) DEVICE — DRAINBAG,ANTI-REFLUX TOWER,L/F,2000ML,LL: Brand: MEDLINE

## (undated) DEVICE — GARMENT,MEDLINE,DVT,INT,CALF,MED, GEN2: Brand: MEDLINE

## (undated) DEVICE — BITE BLOCK W/VELCRO STRAP

## (undated) DEVICE — PACK PROCEDURE SURG CYSTO SVMMC LF